# Patient Record
Sex: MALE | Race: WHITE | NOT HISPANIC OR LATINO | URBAN - METROPOLITAN AREA
[De-identification: names, ages, dates, MRNs, and addresses within clinical notes are randomized per-mention and may not be internally consistent; named-entity substitution may affect disease eponyms.]

---

## 2018-04-05 ENCOUNTER — EMERGENCY (EMERGENCY)
Facility: HOSPITAL | Age: 53
LOS: 0 days | Discharge: HOME | End: 2018-04-06
Attending: EMERGENCY MEDICINE

## 2018-04-05 VITALS
SYSTOLIC BLOOD PRESSURE: 121 MMHG | OXYGEN SATURATION: 93 % | RESPIRATION RATE: 16 BRPM | HEART RATE: 88 BPM | DIASTOLIC BLOOD PRESSURE: 68 MMHG | TEMPERATURE: 97 F

## 2018-04-05 DIAGNOSIS — G89.29 OTHER CHRONIC PAIN: ICD-10-CM

## 2018-04-05 DIAGNOSIS — Z86.718 PERSONAL HISTORY OF OTHER VENOUS THROMBOSIS AND EMBOLISM: ICD-10-CM

## 2018-04-05 DIAGNOSIS — T40.2X1A POISONING BY OTHER OPIOIDS, ACCIDENTAL (UNINTENTIONAL), INITIAL ENCOUNTER: ICD-10-CM

## 2018-04-05 DIAGNOSIS — M54.9 DORSALGIA, UNSPECIFIED: ICD-10-CM

## 2018-04-05 DIAGNOSIS — Z13.6 ENCOUNTER FOR SCREENING FOR CARDIOVASCULAR DISORDERS: ICD-10-CM

## 2018-04-05 LAB
ALBUMIN SERPL ELPH-MCNC: 3.6 G/DL — SIGNIFICANT CHANGE UP (ref 3.5–5.2)
ALP SERPL-CCNC: 90 U/L — SIGNIFICANT CHANGE UP (ref 30–115)
ALT FLD-CCNC: 21 U/L — SIGNIFICANT CHANGE UP (ref 0–41)
ANION GAP SERPL CALC-SCNC: 12 MMOL/L — SIGNIFICANT CHANGE UP (ref 7–14)
APAP SERPL-MCNC: <5 UG/ML — LOW (ref 10–30)
AST SERPL-CCNC: 55 U/L — HIGH (ref 0–41)
BASOPHILS # BLD AUTO: 0.04 K/UL — SIGNIFICANT CHANGE UP (ref 0–0.2)
BASOPHILS NFR BLD AUTO: 0.4 % — SIGNIFICANT CHANGE UP (ref 0–1)
BILIRUB SERPL-MCNC: 0.7 MG/DL — SIGNIFICANT CHANGE UP (ref 0.2–1.2)
BUN SERPL-MCNC: 12 MG/DL — SIGNIFICANT CHANGE UP (ref 10–20)
CALCIUM SERPL-MCNC: 8.2 MG/DL — LOW (ref 8.5–10.1)
CHLORIDE SERPL-SCNC: 99 MMOL/L — SIGNIFICANT CHANGE UP (ref 98–110)
CK MB CFR SERPL CALC: 5.5 NG/ML — SIGNIFICANT CHANGE UP (ref 0.6–6.3)
CK SERPL-CCNC: >2000 U/L — HIGH (ref 0–225)
CO2 SERPL-SCNC: 30 MMOL/L — SIGNIFICANT CHANGE UP (ref 17–32)
CREAT SERPL-MCNC: 1.4 MG/DL — SIGNIFICANT CHANGE UP (ref 0.7–1.5)
EOSINOPHIL # BLD AUTO: 0.3 K/UL — SIGNIFICANT CHANGE UP (ref 0–0.7)
EOSINOPHIL NFR BLD AUTO: 3.1 % — SIGNIFICANT CHANGE UP (ref 0–8)
GAS PNL BLDV: SIGNIFICANT CHANGE UP
GLUCOSE SERPL-MCNC: 108 MG/DL — HIGH (ref 70–99)
HCT VFR BLD CALC: 39.4 % — LOW (ref 42–52)
HGB BLD-MCNC: 12.9 G/DL — LOW (ref 14–18)
IMM GRANULOCYTES NFR BLD AUTO: 0.3 % — SIGNIFICANT CHANGE UP (ref 0.1–0.3)
LYMPHOCYTES # BLD AUTO: 1.07 K/UL — LOW (ref 1.2–3.4)
LYMPHOCYTES # BLD AUTO: 11.2 % — LOW (ref 20.5–51.1)
MCHC RBC-ENTMCNC: 28.8 PG — SIGNIFICANT CHANGE UP (ref 27–31)
MCHC RBC-ENTMCNC: 32.7 G/DL — SIGNIFICANT CHANGE UP (ref 32–37)
MCV RBC AUTO: 87.9 FL — SIGNIFICANT CHANGE UP (ref 80–94)
MONOCYTES # BLD AUTO: 0.77 K/UL — HIGH (ref 0.1–0.6)
MONOCYTES NFR BLD AUTO: 8.1 % — SIGNIFICANT CHANGE UP (ref 1.7–9.3)
NEUTROPHILS # BLD AUTO: 7.35 K/UL — HIGH (ref 1.4–6.5)
NEUTROPHILS NFR BLD AUTO: 76.9 % — HIGH (ref 42.2–75.2)
PLATELET # BLD AUTO: 166 K/UL — SIGNIFICANT CHANGE UP (ref 130–400)
POTASSIUM SERPL-MCNC: 4.2 MMOL/L — SIGNIFICANT CHANGE UP (ref 3.5–5)
POTASSIUM SERPL-SCNC: 4.2 MMOL/L — SIGNIFICANT CHANGE UP (ref 3.5–5)
PROT SERPL-MCNC: 6.4 G/DL — SIGNIFICANT CHANGE UP (ref 6–8)
RBC # BLD: 4.48 M/UL — LOW (ref 4.7–6.1)
RBC # FLD: 13.8 % — SIGNIFICANT CHANGE UP (ref 11.5–14.5)
SALICYLATES SERPL-MCNC: <0.3 MG/DL — LOW (ref 4–30)
SODIUM SERPL-SCNC: 141 MMOL/L — SIGNIFICANT CHANGE UP (ref 135–146)
TROPONIN T SERPL-MCNC: <0.01 NG/ML — SIGNIFICANT CHANGE UP
WBC # BLD: 9.56 K/UL — SIGNIFICANT CHANGE UP (ref 4.8–10.8)
WBC # FLD AUTO: 9.56 K/UL — SIGNIFICANT CHANGE UP (ref 4.8–10.8)

## 2018-04-05 RX ORDER — NALOXONE HYDROCHLORIDE 4 MG/.1ML
0.04 SPRAY NASAL ONCE
Qty: 0 | Refills: 0 | Status: COMPLETED | OUTPATIENT
Start: 2018-04-05 | End: 2018-04-05

## 2018-04-05 RX ORDER — SODIUM CHLORIDE 9 MG/ML
1000 INJECTION INTRAMUSCULAR; INTRAVENOUS; SUBCUTANEOUS
Qty: 0 | Refills: 0 | Status: DISCONTINUED | OUTPATIENT
Start: 2018-04-05 | End: 2018-04-05

## 2018-04-05 RX ADMIN — SODIUM CHLORIDE 250 MILLILITER(S): 9 INJECTION INTRAMUSCULAR; INTRAVENOUS; SUBCUTANEOUS at 23:02

## 2018-04-05 RX ADMIN — SODIUM CHLORIDE 250 MILLILITER(S): 9 INJECTION INTRAMUSCULAR; INTRAVENOUS; SUBCUTANEOUS at 19:07

## 2018-04-05 RX ADMIN — NALOXONE HYDROCHLORIDE 0.04 MILLIGRAM(S): 4 SPRAY NASAL at 16:52

## 2018-04-05 NOTE — ED PROVIDER NOTE - PROGRESS NOTE DETAILS
Discussed with sister, states this is not patient's baseline. Discussed with tox, who recommends observing for 6 hours from ingestion (18:00), then placing in obs if not at baseline. Patient required 0.04 mg Narcan IV a couple hour into his stay. Since then pt stable. As per sister pt is not back to baseline so will place in obs. Discussed with CARLOS Michelle. Aware of CK of 2400, will hydrate.

## 2018-04-05 NOTE — ED PROVIDER NOTE - ATTENDING CONTRIBUTION TO CARE
I have reviewed triage notes and vital signs available at this time.  I have reviewed lab results, if any, available at this time.  I have reviewed EKGs, if any, available at this time.     I have reviewed radiology results and radiographs/scans, if any, available at this time.      I have personally seen, evaluated and participated in this patient's care and find this patient's history and physical examination are consistent with the chart documentation, unless noted below.  ***  patient here for lethargy. takes morphine daily. given narcan by EMS with complete resolution of symptoms. Patient observed in ED and became somnolent again.   Labs pending. Toxicology consulted. May need obs placement.    patient care transferred to Dr. Morgan

## 2018-04-05 NOTE — ED PROVIDER NOTE - MEDICAL DECISION MAKING DETAILS
I personally evaluated the patient. I reviewed the Resident’s or Physician Assistant’s note (as assigned above), and agree with the findings and plan except as documented in my note. Patient placed in obs. Patient will have repeat CK and reassessment for sobriety.

## 2018-04-05 NOTE — ED PROVIDER NOTE - OBJECTIVE STATEMENT
Pt is a 53 y/o male with hx of chronic back pain on morphine 30mg TID, anxiety, who presents to ED for potential overdose. Pt went to pain management doctor's office today, was found to be lethargic and EMS was called. Pt was given Narcan 0.4 mg with improvement. Pt still lethargic at this point but awakens to voice. Pt c/o right sided rib pain after fall 2 weeks ago. No SOB, chest pain, fever.

## 2018-04-05 NOTE — ED ADULT TRIAGE NOTE - CHIEF COMPLAINT QUOTE
pt was at rehab and was drowsy, given 0.5 mg of narcan by EMS. pt awake and alert. prescribed morphine for back pain

## 2018-04-05 NOTE — ED PROVIDER NOTE - PHYSICAL EXAMINATION
Constitutional: Well developed, well nourished. NAD.  Head: Normocephalic, atraumatic.  Eyes: PERRL. EOMI.  ENT: No nasal discharge. Mucous membranes moist.  Neck: Supple. Painless ROM.  Cardiovascular: Normal S1, S2. Regular rate and rhythm. No murmurs, rubs, or gallops.  Pulmonary: Normal respiratory rate and effort. Lungs clear to auscultation bilaterally. No wheezing, rales, or rhonchi.  Abdominal: Soft. Nondistended. Nontender. No rebound, guarding, rigidity.  Extremities. Pelvis stable. No lower extremity edema, symmetric calves.  Skin: No rashes, cyanosis.  Neuro: AAOx3. No focal neurological deficits.  Psych: Somnolent but awakens to voice.

## 2018-04-05 NOTE — ED ADULT NURSE NOTE - OBJECTIVE STATEMENT
pt coming here from dr. Hinton office as per patient, states he was feeling dizzy and was lethargic. states he did not take any extra pain medication  then he is prescribed. pt is sleepy but easily aroused and follows commands, answers questions appropriately, pt is on his cell phone. GCS 15, pupils are equal and reactive to light positive PERRLA. pt is placed on continuous oxygen monitoring device, nasal cannula in place 3 liters.

## 2018-04-06 VITALS
RESPIRATION RATE: 18 BRPM | SYSTOLIC BLOOD PRESSURE: 115 MMHG | OXYGEN SATURATION: 98 % | HEART RATE: 84 BPM | DIASTOLIC BLOOD PRESSURE: 71 MMHG | TEMPERATURE: 98 F

## 2018-04-06 LAB
CK SERPL-CCNC: 1954 U/L — HIGH (ref 0–225)
NT-PROBNP SERPL-SCNC: 528 PG/ML — HIGH (ref 0–300)
TROPONIN T SERPL-MCNC: <0.01 NG/ML — SIGNIFICANT CHANGE UP

## 2018-04-06 RX ORDER — RIVAROXABAN 15 MG-20MG
20 KIT ORAL EVERY 24 HOURS
Qty: 0 | Refills: 0 | Status: DISCONTINUED | OUTPATIENT
Start: 2018-04-06 | End: 2018-04-05

## 2018-04-06 RX ORDER — SODIUM CHLORIDE 9 MG/ML
1000 INJECTION INTRAMUSCULAR; INTRAVENOUS; SUBCUTANEOUS ONCE
Qty: 0 | Refills: 0 | Status: COMPLETED | OUTPATIENT
Start: 2018-04-06 | End: 2018-04-06

## 2018-04-06 RX ORDER — FOLIC ACID 0.8 MG
1 TABLET ORAL DAILY
Qty: 0 | Refills: 0 | Status: DISCONTINUED | OUTPATIENT
Start: 2018-04-06 | End: 2018-04-05

## 2018-04-06 RX ORDER — MORPHINE SULFATE 50 MG/1
7.5 CAPSULE, EXTENDED RELEASE ORAL ONCE
Qty: 0 | Refills: 0 | Status: DISCONTINUED | OUTPATIENT
Start: 2018-04-06 | End: 2018-04-05

## 2018-04-06 RX ORDER — MORPHINE SULFATE 50 MG/1
15 CAPSULE, EXTENDED RELEASE ORAL DAILY
Qty: 0 | Refills: 0 | Status: DISCONTINUED | OUTPATIENT
Start: 2018-04-06 | End: 2018-04-05

## 2018-04-06 RX ORDER — FLUOXETINE HCL 10 MG
40 CAPSULE ORAL ONCE
Qty: 0 | Refills: 0 | Status: COMPLETED | OUTPATIENT
Start: 2018-04-06 | End: 2018-04-06

## 2018-04-06 RX ADMIN — SODIUM CHLORIDE 3000 MILLILITER(S): 9 INJECTION INTRAMUSCULAR; INTRAVENOUS; SUBCUTANEOUS at 05:50

## 2018-04-06 RX ADMIN — Medication 40 MILLIGRAM(S): at 11:53

## 2018-04-06 RX ADMIN — MORPHINE SULFATE 15 MILLIGRAM(S): 50 CAPSULE, EXTENDED RELEASE ORAL at 13:16

## 2018-04-06 RX ADMIN — Medication 1 MILLIGRAM(S): at 11:53

## 2018-04-06 RX ADMIN — SODIUM CHLORIDE 250 MILLILITER(S): 9 INJECTION INTRAMUSCULAR; INTRAVENOUS; SUBCUTANEOUS at 05:37

## 2018-04-06 NOTE — ED ADULT NURSE REASSESSMENT NOTE - NS ED NURSE REASSESS COMMENT FT1
pt lying in stretcher attempted to use restroom with assistance. pt receiving IVF via IV access. pt denies pain at this time pt on stretcher with bed alarms audible on cardiac monitoring. denies chest pain at this time,. will continue to monitor.

## 2018-04-06 NOTE — ED CDU PROVIDER INITIAL DAY NOTE - NS ED ROS FT
Constitutional: + lethargic. no fever, chills or generalized weakness  Cardiovascular: no chest pain, no sob, no syncope , no palpitations  Respiratory: no cough, no shortness of breath  Gastrointestinal: no nausea, vomiting or diarrhea. no abdominal pain.  Musculoskeletal: + chronic back pain. no fall or trauma.  no neck pain, no back pain, no joint pain.    Integumentary: no rash or skin changes. no edema  Neurological: + lethargic. no headache, no dizziness, no visual changes, no UE/LE weakness or paresthesias. no change in mental status. no neck pain or stiffness.   Psychiatric: no suicidal or homicidal ideation or attempt. no hallucinations.

## 2018-04-06 NOTE — ED CDU PROVIDER DISPOSITION NOTE - CLINICAL COURSE
53yo man h/o DVT on Xarelto, chronic back pain due to injury, on morphine sulfate, was sent in by pain management out of concern for possible overdose as pt was lethargic in the office. Pt's mental status improved in the ambulance and ED with low dose narcan; however pt denies taking extra meds. Pt was observed in the ED/CDU; labs ok except for initial CPK >2000. Pt was hydrated and CK was repeating, trending down. Pt urinating well with normal renal function. On further history pt offered some cough, painful deep breaths after fall, had some R costal margin pain on palpation. Repeat CXR was pulm congestion, though pt denied SOB and was ambulatory in the ED without sx, as well as able to lie supine without sx. Enzymes and EKG unremarkable; MS remained appropriate throughout CDU stay. Echo unremarkable. Rib series negative for fx. Pt felt better and was d/c home to f/u with his pain management MD and PMD.

## 2018-04-06 NOTE — ED CDU PROVIDER SUBSEQUENT DAY NOTE - PROGRESS NOTE DETAILS
Per tox, pt can be d/c home; however he c/o cough/sob over the last few day and a fall with R rib pain--this may explain the elevated CPK. Says he was not confused due to meds and denies taking extra, but does say he isn't sure why he fell. On my eval he is awake and alert, nontoxic appearing, lung with b/l crackles at bases tho pt is comfortable lying flat. Repeat CXR looks like pulm congestion, no infiltrate. NO obvious rib fx. EKG and first set enzymes negative (except elevated CPK), will repeat with BNP and do echo, reassess. Pt still likely to be d/c home today and wants to go.

## 2018-04-06 NOTE — ED CDU PROVIDER INITIAL DAY NOTE - OBJECTIVE STATEMENT
53 yo male with h/o DVT (on xarelto), chronic back pain (takes 30 mg morphine TID) placed in obs for opioid overdose and reassessment. Patient explains he takes morphine for chronic pain and accidently took an extra dose today. Patient was in pain management doctor's office, noticed patient was lethargic and sent to ED. Patient given 0.4 narcan by EMS with improvement, then given another 0.04. As per ex-wife patient is still not at baseline. no fall or trauma. Patient with elevated CK, will repeat after hydration. Denies fever, chills, chest pain, sob, abdominal pain, N/V, UE/LE weakness or paresthesias, headache, dizziness. no suicidal or homicidal ideation or attempt.

## 2018-06-21 ENCOUNTER — OUTPATIENT (OUTPATIENT)
Dept: OUTPATIENT SERVICES | Facility: HOSPITAL | Age: 53
LOS: 1 days | Discharge: HOME | End: 2018-06-21

## 2018-06-21 DIAGNOSIS — N28.1 CYST OF KIDNEY, ACQUIRED: ICD-10-CM

## 2018-06-21 DIAGNOSIS — D41.01 NEOPLASM OF UNCERTAIN BEHAVIOR OF RIGHT KIDNEY: ICD-10-CM

## 2018-07-19 ENCOUNTER — OUTPATIENT (OUTPATIENT)
Dept: OUTPATIENT SERVICES | Facility: HOSPITAL | Age: 53
LOS: 1 days | Discharge: HOME | End: 2018-07-19

## 2018-07-19 DIAGNOSIS — D41.01 NEOPLASM OF UNCERTAIN BEHAVIOR OF RIGHT KIDNEY: ICD-10-CM

## 2018-07-19 PROBLEM — Z86.718 PERSONAL HISTORY OF OTHER VENOUS THROMBOSIS AND EMBOLISM: Chronic | Status: ACTIVE | Noted: 2018-04-05

## 2018-07-19 PROBLEM — M54.9 DORSALGIA, UNSPECIFIED: Chronic | Status: ACTIVE | Noted: 2018-04-05

## 2019-11-21 ENCOUNTER — EMERGENCY (EMERGENCY)
Facility: HOSPITAL | Age: 54
LOS: 0 days | Discharge: HOME | End: 2019-11-22
Attending: EMERGENCY MEDICINE | Admitting: EMERGENCY MEDICINE
Payer: MEDICARE

## 2019-11-21 VITALS
TEMPERATURE: 98 F | DIASTOLIC BLOOD PRESSURE: 98 MMHG | HEART RATE: 78 BPM | OXYGEN SATURATION: 96 % | RESPIRATION RATE: 18 BRPM | SYSTOLIC BLOOD PRESSURE: 106 MMHG

## 2019-11-21 VITALS — WEIGHT: 199.96 LBS | HEIGHT: 68 IN

## 2019-11-21 DIAGNOSIS — Z79.01 LONG TERM (CURRENT) USE OF ANTICOAGULANTS: ICD-10-CM

## 2019-11-21 DIAGNOSIS — Z90.5 ACQUIRED ABSENCE OF KIDNEY: Chronic | ICD-10-CM

## 2019-11-21 DIAGNOSIS — R56.9 UNSPECIFIED CONVULSIONS: ICD-10-CM

## 2019-11-21 DIAGNOSIS — G40.909 EPILEPSY, UNSPECIFIED, NOT INTRACTABLE, WITHOUT STATUS EPILEPTICUS: ICD-10-CM

## 2019-11-21 PROCEDURE — 99285 EMERGENCY DEPT VISIT HI MDM: CPT | Mod: GC

## 2019-11-21 NOTE — ED ADULT NURSE NOTE - PMH
Chronic back pain    History of DVT (deep vein thrombosis) Chronic back pain    Drug abuse    History of DVT (deep vein thrombosis)

## 2019-11-21 NOTE — ED ADULT NURSE NOTE - CHIEF COMPLAINT QUOTE
Pt states he took Morphine sulfate, at home, got dizzy, sister states he had a seizure, pt is unsure if he did have seizure.

## 2019-11-22 LAB
ALBUMIN SERPL ELPH-MCNC: 3.9 G/DL — SIGNIFICANT CHANGE UP (ref 3.5–5.2)
ALP SERPL-CCNC: 112 U/L — SIGNIFICANT CHANGE UP (ref 30–115)
ALT FLD-CCNC: 32 U/L — SIGNIFICANT CHANGE UP (ref 0–41)
ANION GAP SERPL CALC-SCNC: 11 MMOL/L — SIGNIFICANT CHANGE UP (ref 7–14)
AST SERPL-CCNC: 18 U/L — SIGNIFICANT CHANGE UP (ref 0–41)
BASOPHILS # BLD AUTO: 0.07 K/UL — SIGNIFICANT CHANGE UP (ref 0–0.2)
BASOPHILS NFR BLD AUTO: 0.7 % — SIGNIFICANT CHANGE UP (ref 0–1)
BILIRUB SERPL-MCNC: 0.2 MG/DL — SIGNIFICANT CHANGE UP (ref 0.2–1.2)
BUN SERPL-MCNC: 10 MG/DL — SIGNIFICANT CHANGE UP (ref 10–20)
CALCIUM SERPL-MCNC: 8.7 MG/DL — SIGNIFICANT CHANGE UP (ref 8.5–10.1)
CHLORIDE SERPL-SCNC: 105 MMOL/L — SIGNIFICANT CHANGE UP (ref 98–110)
CO2 SERPL-SCNC: 26 MMOL/L — SIGNIFICANT CHANGE UP (ref 17–32)
CREAT SERPL-MCNC: 1.2 MG/DL — SIGNIFICANT CHANGE UP (ref 0.7–1.5)
EOSINOPHIL # BLD AUTO: 0.53 K/UL — SIGNIFICANT CHANGE UP (ref 0–0.7)
EOSINOPHIL NFR BLD AUTO: 5.5 % — SIGNIFICANT CHANGE UP (ref 0–8)
GLUCOSE SERPL-MCNC: 119 MG/DL — HIGH (ref 70–99)
HCT VFR BLD CALC: 47.8 % — SIGNIFICANT CHANGE UP (ref 42–52)
HGB BLD-MCNC: 15.1 G/DL — SIGNIFICANT CHANGE UP (ref 14–18)
IMM GRANULOCYTES NFR BLD AUTO: 0.2 % — SIGNIFICANT CHANGE UP (ref 0.1–0.3)
LYMPHOCYTES # BLD AUTO: 1.97 K/UL — SIGNIFICANT CHANGE UP (ref 1.2–3.4)
LYMPHOCYTES # BLD AUTO: 20.3 % — LOW (ref 20.5–51.1)
MAGNESIUM SERPL-MCNC: 1.9 MG/DL — SIGNIFICANT CHANGE UP (ref 1.8–2.4)
MCHC RBC-ENTMCNC: 28.1 PG — SIGNIFICANT CHANGE UP (ref 27–31)
MCHC RBC-ENTMCNC: 31.6 G/DL — LOW (ref 32–37)
MCV RBC AUTO: 89 FL — SIGNIFICANT CHANGE UP (ref 80–94)
MONOCYTES # BLD AUTO: 0.68 K/UL — HIGH (ref 0.1–0.6)
MONOCYTES NFR BLD AUTO: 7 % — SIGNIFICANT CHANGE UP (ref 1.7–9.3)
NEUTROPHILS # BLD AUTO: 6.43 K/UL — SIGNIFICANT CHANGE UP (ref 1.4–6.5)
NEUTROPHILS NFR BLD AUTO: 66.3 % — SIGNIFICANT CHANGE UP (ref 42.2–75.2)
NRBC # BLD: 0 /100 WBCS — SIGNIFICANT CHANGE UP (ref 0–0)
PHOSPHATE SERPL-MCNC: 4.1 MG/DL — SIGNIFICANT CHANGE UP (ref 2.1–4.9)
PLATELET # BLD AUTO: 246 K/UL — SIGNIFICANT CHANGE UP (ref 130–400)
POTASSIUM SERPL-MCNC: 4.2 MMOL/L — SIGNIFICANT CHANGE UP (ref 3.5–5)
POTASSIUM SERPL-SCNC: 4.2 MMOL/L — SIGNIFICANT CHANGE UP (ref 3.5–5)
PROT SERPL-MCNC: 6.6 G/DL — SIGNIFICANT CHANGE UP (ref 6–8)
RBC # BLD: 5.37 M/UL — SIGNIFICANT CHANGE UP (ref 4.7–6.1)
RBC # FLD: 14.2 % — SIGNIFICANT CHANGE UP (ref 11.5–14.5)
SODIUM SERPL-SCNC: 142 MMOL/L — SIGNIFICANT CHANGE UP (ref 135–146)
WBC # BLD: 9.7 K/UL — SIGNIFICANT CHANGE UP (ref 4.8–10.8)
WBC # FLD AUTO: 9.7 K/UL — SIGNIFICANT CHANGE UP (ref 4.8–10.8)

## 2019-11-22 PROCEDURE — 70450 CT HEAD/BRAIN W/O DYE: CPT | Mod: 26

## 2019-11-22 NOTE — ED PROVIDER NOTE - CLINICAL SUMMARY MEDICAL DECISION MAKING FREE TEXT BOX
54yM p/w witnessed sz, now back to baseline, no apparent traumatic injuries, no fever.  Labs, EKG, CTH reassuring.  Pt unwilling to stay in hospital for EEG monitoring/further workup.  D/w neurology - pt w/ very good preexisting neuro f/u, as he is back to baseline, they are willing to see him in the office for further workup.  Ok to dc with supportive care, return precautions.

## 2019-11-22 NOTE — ED PROVIDER NOTE - OBJECTIVE STATEMENT
53yo M with PMH of chronic back pain 2/2 prior back injury (fractures 53yo M with PMH of chronic back pain 2/2 prior back injury, depression, neuropathy, and kidney cancer s/p removal presenting to ED with brief sz episodes that occurred PTA. Patient was at home and his sister states she heard a loud thud, went to check on him, and found him on the ground with eye rolling and tensing up of arms. Lasted for about 1 minute, patient started to become more alert, and then had another 1 minute episode of sz-like activity. Patient states he had one seizure in the past 2/2 drug building up in his system due to kidney dysfunction. Sees his neurologist Dr. Treviño regularly, last saw last week. Currently back to baseline in ED, GCS 15. Denies any f/c, CP, SOB, neck pain, abd pain, n/v/d, new numbness/tingling, extremity pain, HA, dizziness, or vision changes. Acting appropriately. No drug use. +Smoker.

## 2019-11-22 NOTE — ED PROVIDER NOTE - PHYSICAL EXAMINATION
PHYSICAL EXAM: I have reviewed current vital signs.  GENERAL: NAD, well-developed.  HEAD:  Normocephalic, atraumatic.  EYES: EOMI, PERRL, conjunctiva and sclera clear.  ENT: MMM, no erythema/exudates.  NECK: Supple, full ROM.  CHEST/LUNG: Clear to auscultation bilaterally; no wheezes, rales, or rhonchi.  HEART: Regular rate and rhythm, normal S1 and S2; no murmurs, rubs, or gallops.  ABDOMEN: Soft, nontender, nondistended. Atraumatic.  EXTREMITIES:  2+ peripheral pulses; FROM.  PSYCH: Cooperative, appropriate, normal mood and affect.  NEUROLOGY: A&O x 3. Motor 5/5. Sensory intact. No focal neurological deficits. CN II - XII grossly intact. No facial droop, speech WNL.  SKIN: Warm and dry.

## 2019-11-22 NOTE — ED PROVIDER NOTE - NS ED ROS FT
Constitutional:  No fevers or chills.  Eyes:  No visual changes, eye pain, or discharge.  ENT:  No sore throat.  Neck:  No neck pain.  Cardiac:  No CP or edema.  Resp:  No cough or SOB.    GI:  No nausea, vomiting, diarrhea, or abdominal pain.  :  No dysuria, frequency, or hematuria.  MSK:  No myalgias or joint pain/swelling.  Neuro:  No headache, dizziness, or weakness.  Skin:  No skin rash.

## 2019-11-22 NOTE — ED PROVIDER NOTE - PROGRESS NOTE DETAILS
Informed patient and family of lab and radiology results. Spoke with Dr. Treviño's partner. Encouraged patient to f/u with Dr. Treviño as soon as possible. GCS 15, no further seizure like activity. Well appearing. Tolerating PO without any difficulty. Patient will have outpatient w/u done with Dr. Treviño.

## 2019-11-22 NOTE — ED PROVIDER NOTE - ATTENDING CONTRIBUTION TO CARE
54yM chronic back pain/neuropathy, renal cancer s/p nephrectomy p/w seizure episode - sister heard him drop something (which happens often) and checked up on him - found him seizing, w/ tense b/l UE and eyes rolling back.  She witnessed ~1min of sz activity, which then seemed to joshua, but he then had another 1min of sz activity with no return to consciousness in between.  No fevers or recent illness. This is his second lifetime seizure.  No recent head trauma.  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    CONSTITUTIONAL: well developed; well nourished; well appearing in no acute distress  HEAD: normocephalic; atraumatic  EYES: PERRL, no conjunctival injection, no scleral icterus  ENT: no nasal discharge; airway clear.  NECK: supple; non tender. + full passive ROM in all directions  CARD: S1, S2 normal; no murmurs, gallops, or rubs. Regular rate and rhythm  RESP: no wheezes, rales or rhonchi. Good air movement bilaterally without significant accessory muscle use  ABD: soft; non-distended; non-tender. No rebound, no guarding, no pulsatile abdominal mass  EXT: moving all extremities spontaneously. No clubbing, cyanosis or edema  SKIN: warm and dry, no lesions noted  NEURO: alert, oriented, CN II-XII grossly intact, motor and sensory grossly intact, speech nonslurred, no focal deficits. GCS 15  PSYCH: calm, cooperative, appropriate, good eye contact, logical thought process, no apparent danger to self or others

## 2019-11-22 NOTE — ED PROVIDER NOTE - NSFOLLOWUPINSTRUCTIONS_ED_ALL_ED_FT
Please follow-up as soon as possible with your neurologist, Dr. Treviño.    Seizure  A seizure is abnormal electrical activity in the brain; the specific cause may or may not be found. Prior to a seizure you may experience a warning sensation (aura) that may include fear, nausea, dizziness, and visual changes such as flashing lights of spots. Common symptoms during the seizure may include an altered mental status, rhythmic jerking movements, drooling, grunting, loss of bladder or bowel control, or tongue biting. After a seizure, you may feel confused and sleepy.     Do not swim, drive, operate machinery, or engage in any risky activity during which a seizure could cause further injury to you or others. Teach friends and family what to do if you HAVE a seizure which includes laying you on the ground with your head on a cushion and turning you to the side to keep your breathing passages clear in case of vomiting.    SEEK IMMEDIATE MEDICAL CARE IF YOU HAVE ANY OF THE FOLLOWING SYMPTOMS: seizure lasting over 5 minutes, not waking up or persistent altered mental status after the seizure, or more frequent or worsening seizures.

## 2019-11-22 NOTE — ED PROVIDER NOTE - PATIENT PORTAL LINK FT
You can access the FollowMyHealth Patient Portal offered by [facility] by registering at the following website: [email or web address]. By joining [facility]’s FollowMyHealth portal, you will also be able to view your health information using other applications (apps) compatible with our system.

## 2020-11-11 PROBLEM — F19.10 OTHER PSYCHOACTIVE SUBSTANCE ABUSE, UNCOMPLICATED: Chronic | Status: ACTIVE | Noted: 2019-11-21

## 2020-11-16 ENCOUNTER — OUTPATIENT (OUTPATIENT)
Dept: OUTPATIENT SERVICES | Facility: HOSPITAL | Age: 55
LOS: 1 days | Discharge: HOME | End: 2020-11-16
Payer: MEDICARE

## 2020-11-16 DIAGNOSIS — Z90.5 ACQUIRED ABSENCE OF KIDNEY: Chronic | ICD-10-CM

## 2020-11-16 PROCEDURE — 93970 EXTREMITY STUDY: CPT | Mod: 26

## 2020-11-21 ENCOUNTER — OUTPATIENT (OUTPATIENT)
Dept: OUTPATIENT SERVICES | Facility: HOSPITAL | Age: 55
LOS: 1 days | Discharge: HOME | End: 2020-11-21
Payer: MEDICARE

## 2020-11-21 DIAGNOSIS — Z90.5 ACQUIRED ABSENCE OF KIDNEY: Chronic | ICD-10-CM

## 2020-11-21 DIAGNOSIS — C64.9 MALIGNANT NEOPLASM OF UNSPECIFIED KIDNEY, EXCEPT RENAL PELVIS: ICD-10-CM

## 2020-11-21 PROCEDURE — 74182 MRI ABDOMEN W/CONTRAST: CPT | Mod: 26

## 2020-11-21 PROCEDURE — 71046 X-RAY EXAM CHEST 2 VIEWS: CPT | Mod: 26

## 2020-11-23 DIAGNOSIS — M79.89 OTHER SPECIFIED SOFT TISSUE DISORDERS: ICD-10-CM

## 2021-08-29 ENCOUNTER — INPATIENT (INPATIENT)
Facility: HOSPITAL | Age: 56
LOS: 2 days | Discharge: HOME | End: 2021-09-01
Attending: INTERNAL MEDICINE | Admitting: INTERNAL MEDICINE
Payer: MEDICARE

## 2021-08-29 VITALS — WEIGHT: 226.41 LBS | HEIGHT: 68 IN

## 2021-08-29 DIAGNOSIS — N18.9 CHRONIC KIDNEY DISEASE, UNSPECIFIED: ICD-10-CM

## 2021-08-29 DIAGNOSIS — R20.0 ANESTHESIA OF SKIN: ICD-10-CM

## 2021-08-29 DIAGNOSIS — R20.1 HYPOESTHESIA OF SKIN: ICD-10-CM

## 2021-08-29 DIAGNOSIS — Z79.899 OTHER LONG TERM (CURRENT) DRUG THERAPY: ICD-10-CM

## 2021-08-29 DIAGNOSIS — R29.898 OTHER SYMPTOMS AND SIGNS INVOLVING THE MUSCULOSKELETAL SYSTEM: ICD-10-CM

## 2021-08-29 DIAGNOSIS — Z85.528 PERSONAL HISTORY OF OTHER MALIGNANT NEOPLASM OF KIDNEY: ICD-10-CM

## 2021-08-29 DIAGNOSIS — F32.9 MAJOR DEPRESSIVE DISORDER, SINGLE EPISODE, UNSPECIFIED: ICD-10-CM

## 2021-08-29 DIAGNOSIS — Z90.5 ACQUIRED ABSENCE OF KIDNEY: Chronic | ICD-10-CM

## 2021-08-29 DIAGNOSIS — G92 TOXIC ENCEPHALOPATHY: ICD-10-CM

## 2021-08-29 DIAGNOSIS — Z86.718 PERSONAL HISTORY OF OTHER VENOUS THROMBOSIS AND EMBOLISM: ICD-10-CM

## 2021-08-29 DIAGNOSIS — Z79.01 LONG TERM (CURRENT) USE OF ANTICOAGULANTS: ICD-10-CM

## 2021-08-29 DIAGNOSIS — Z79.891 LONG TERM (CURRENT) USE OF OPIATE ANALGESIC: ICD-10-CM

## 2021-08-29 DIAGNOSIS — R47.01 APHASIA: ICD-10-CM

## 2021-08-29 DIAGNOSIS — T40.2X5A ADVERSE EFFECT OF OTHER OPIOIDS, INITIAL ENCOUNTER: ICD-10-CM

## 2021-08-29 DIAGNOSIS — F17.210 NICOTINE DEPENDENCE, CIGARETTES, UNCOMPLICATED: ICD-10-CM

## 2021-08-29 DIAGNOSIS — G89.29 OTHER CHRONIC PAIN: ICD-10-CM

## 2021-08-29 DIAGNOSIS — E87.2 ACIDOSIS: ICD-10-CM

## 2021-08-29 DIAGNOSIS — I12.9 HYPERTENSIVE CHRONIC KIDNEY DISEASE WITH STAGE 1 THROUGH STAGE 4 CHRONIC KIDNEY DISEASE, OR UNSPECIFIED CHRONIC KIDNEY DISEASE: ICD-10-CM

## 2021-08-29 DIAGNOSIS — I63.9 CEREBRAL INFARCTION, UNSPECIFIED: ICD-10-CM

## 2021-08-29 DIAGNOSIS — M54.9 DORSALGIA, UNSPECIFIED: ICD-10-CM

## 2021-08-29 DIAGNOSIS — E78.5 HYPERLIPIDEMIA, UNSPECIFIED: ICD-10-CM

## 2021-08-29 DIAGNOSIS — R47.1 DYSARTHRIA AND ANARTHRIA: ICD-10-CM

## 2021-08-29 DIAGNOSIS — Z66 DO NOT RESUSCITATE: ICD-10-CM

## 2021-08-29 DIAGNOSIS — R29.810 FACIAL WEAKNESS: ICD-10-CM

## 2021-08-29 DIAGNOSIS — Z90.5 ACQUIRED ABSENCE OF KIDNEY: ICD-10-CM

## 2021-08-29 DIAGNOSIS — F41.9 ANXIETY DISORDER, UNSPECIFIED: ICD-10-CM

## 2021-08-29 LAB
ALBUMIN SERPL ELPH-MCNC: 3.5 G/DL — SIGNIFICANT CHANGE UP (ref 3.5–5.2)
ALP SERPL-CCNC: 108 U/L — SIGNIFICANT CHANGE UP (ref 30–115)
ALT FLD-CCNC: 16 U/L — SIGNIFICANT CHANGE UP (ref 0–41)
ANION GAP SERPL CALC-SCNC: 12 MMOL/L — SIGNIFICANT CHANGE UP (ref 7–14)
APAP SERPL-MCNC: <5 UG/ML — LOW (ref 10–30)
APTT BLD: 31.5 SEC — SIGNIFICANT CHANGE UP (ref 27–39.2)
AST SERPL-CCNC: 71 U/L — HIGH (ref 0–41)
BASE EXCESS BLDV CALC-SCNC: 1.6 MMOL/L — SIGNIFICANT CHANGE UP (ref -2–3)
BASOPHILS # BLD AUTO: 0.02 K/UL — SIGNIFICANT CHANGE UP (ref 0–0.2)
BASOPHILS NFR BLD AUTO: 0.3 % — SIGNIFICANT CHANGE UP (ref 0–1)
BILIRUB SERPL-MCNC: 1.1 MG/DL — SIGNIFICANT CHANGE UP (ref 0.2–1.2)
BUN SERPL-MCNC: 8 MG/DL — LOW (ref 10–20)
CALCIUM SERPL-MCNC: 8.7 MG/DL — SIGNIFICANT CHANGE UP (ref 8.5–10.1)
CHLORIDE SERPL-SCNC: 99 MMOL/L — SIGNIFICANT CHANGE UP (ref 98–110)
CO2 SERPL-SCNC: 27 MMOL/L — SIGNIFICANT CHANGE UP (ref 17–32)
CREAT SERPL-MCNC: 1.5 MG/DL — SIGNIFICANT CHANGE UP (ref 0.7–1.5)
EOSINOPHIL # BLD AUTO: 0.09 K/UL — SIGNIFICANT CHANGE UP (ref 0–0.7)
EOSINOPHIL NFR BLD AUTO: 1.3 % — SIGNIFICANT CHANGE UP (ref 0–8)
GAS PNL BLDV: SIGNIFICANT CHANGE UP
GLUCOSE SERPL-MCNC: 110 MG/DL — HIGH (ref 70–99)
HCO3 BLDV-SCNC: 30 MMOL/L — HIGH (ref 22–29)
HCT VFR BLD CALC: 42.5 % — SIGNIFICANT CHANGE UP (ref 42–52)
HGB BLD-MCNC: 13.9 G/DL — LOW (ref 14–18)
IMM GRANULOCYTES NFR BLD AUTO: 0.3 % — SIGNIFICANT CHANGE UP (ref 0.1–0.3)
INR BLD: 1.03 RATIO — SIGNIFICANT CHANGE UP (ref 0.65–1.3)
LACTATE BLDV-MCNC: 1.2 MMOL/L — SIGNIFICANT CHANGE UP (ref 0.5–2)
LYMPHOCYTES # BLD AUTO: 1.05 K/UL — LOW (ref 1.2–3.4)
LYMPHOCYTES # BLD AUTO: 14.6 % — LOW (ref 20.5–51.1)
MCHC RBC-ENTMCNC: 29.4 PG — SIGNIFICANT CHANGE UP (ref 27–31)
MCHC RBC-ENTMCNC: 32.7 G/DL — SIGNIFICANT CHANGE UP (ref 32–37)
MCV RBC AUTO: 90 FL — SIGNIFICANT CHANGE UP (ref 80–94)
MONOCYTES # BLD AUTO: 0.77 K/UL — HIGH (ref 0.1–0.6)
MONOCYTES NFR BLD AUTO: 10.7 % — HIGH (ref 1.7–9.3)
NEUTROPHILS # BLD AUTO: 5.23 K/UL — SIGNIFICANT CHANGE UP (ref 1.4–6.5)
NEUTROPHILS NFR BLD AUTO: 72.8 % — SIGNIFICANT CHANGE UP (ref 42.2–75.2)
NRBC # BLD: 0 /100 WBCS — SIGNIFICANT CHANGE UP (ref 0–0)
PCO2 BLDV: 66 MMHG — HIGH (ref 42–55)
PH BLDV: 7.27 — LOW (ref 7.32–7.43)
PLATELET # BLD AUTO: 160 K/UL — SIGNIFICANT CHANGE UP (ref 130–400)
PO2 BLDV: 56 MMHG — SIGNIFICANT CHANGE UP
POTASSIUM SERPL-MCNC: 4.1 MMOL/L — SIGNIFICANT CHANGE UP (ref 3.5–5)
POTASSIUM SERPL-SCNC: 4.1 MMOL/L — SIGNIFICANT CHANGE UP (ref 3.5–5)
PROT SERPL-MCNC: 6 G/DL — SIGNIFICANT CHANGE UP (ref 6–8)
PROTHROM AB SERPL-ACNC: 11.8 SEC — SIGNIFICANT CHANGE UP (ref 9.95–12.87)
RBC # BLD: 4.72 M/UL — SIGNIFICANT CHANGE UP (ref 4.7–6.1)
RBC # FLD: 14.5 % — SIGNIFICANT CHANGE UP (ref 11.5–14.5)
SALICYLATES SERPL-MCNC: <0.3 MG/DL — LOW (ref 4–30)
SAO2 % BLDV: 86.3 % — SIGNIFICANT CHANGE UP
SARS-COV-2 RNA SPEC QL NAA+PROBE: SIGNIFICANT CHANGE UP
SODIUM SERPL-SCNC: 138 MMOL/L — SIGNIFICANT CHANGE UP (ref 135–146)
TROPONIN T SERPL-MCNC: <0.01 NG/ML — SIGNIFICANT CHANGE UP
WBC # BLD: 7.18 K/UL — SIGNIFICANT CHANGE UP (ref 4.8–10.8)
WBC # FLD AUTO: 7.18 K/UL — SIGNIFICANT CHANGE UP (ref 4.8–10.8)

## 2021-08-29 PROCEDURE — 0042T: CPT

## 2021-08-29 PROCEDURE — 70450 CT HEAD/BRAIN W/O DYE: CPT | Mod: 26,MA,59

## 2021-08-29 PROCEDURE — 70498 CT ANGIOGRAPHY NECK: CPT | Mod: 26,MA

## 2021-08-29 PROCEDURE — 93010 ELECTROCARDIOGRAM REPORT: CPT

## 2021-08-29 PROCEDURE — 99291 CRITICAL CARE FIRST HOUR: CPT

## 2021-08-29 PROCEDURE — 70496 CT ANGIOGRAPHY HEAD: CPT | Mod: 26,MA

## 2021-08-29 RX ORDER — NALOXONE HYDROCHLORIDE 4 MG/.1ML
0.4 SPRAY NASAL ONCE
Refills: 0 | Status: COMPLETED | OUTPATIENT
Start: 2021-08-29 | End: 2021-08-29

## 2021-08-29 RX ORDER — SODIUM CHLORIDE 9 MG/ML
1000 INJECTION, SOLUTION INTRAVENOUS ONCE
Refills: 0 | Status: COMPLETED | OUTPATIENT
Start: 2021-08-29 | End: 2021-08-29

## 2021-08-29 RX ADMIN — NALOXONE HYDROCHLORIDE 0.4 MILLIGRAM(S): 4 SPRAY NASAL at 20:05

## 2021-08-29 RX ADMIN — SODIUM CHLORIDE 1000 MILLILITER(S): 9 INJECTION, SOLUTION INTRAVENOUS at 20:05

## 2021-08-29 NOTE — ED PROVIDER NOTE - PROGRESS NOTE DETAILS
JR: Discussed case with Dr. Haque- patient is out of the window for tpa. Will follow for HCT, CTA, CTP BK: Received signout from Dr. Ontiveros. Patient found down around 1 PM with R facial droop, R sided weakness, and aphasia, last known normal unknown. CT non con negative. Pending CTA and perfusion. Patient also took multiple doses of morphine PTA, somnolent, confused, retaining CO2. Will give narcan and reassess.

## 2021-08-29 NOTE — ED PROVIDER NOTE - NS ED ROS FT
Review of Systems:  •	CONSTITUTIONAL - No fever, No diaphoresis, No weight change  •	SKIN - No rash  •	HEMATOLOGIC - No abnormal bleeding or bruising  •	EYES - No eye pain, No blurred vision  •	ENT - No change in hearing, No sore throat, No neck pain, No rhinorrhea, No ear pain  •	RESPIRATORY - No shortness of breath, No cough  •	CARDIAC -No chest pain, No palpitations  •	GI - No abdominal pain, No nausea, No vomiting, No diarrhea, No constipation, No bright red blood per rectum or melena. No flank pain  •                 - No dysuria, frequency, hematuria.   •	ENDO - No polydypsia, No polyuria, No heat/cold intolerance  •	MUSCULOSKELETAL - No joint paint, No swelling, No back pain  •	NEUROLOGIC - No numbness, + right sided weakness, + right facial droop, + difficulty speaking, No headache, No dizziness  All other systems negative, unless specified in HPI

## 2021-08-29 NOTE — ED PROVIDER NOTE - ATTENDING CONTRIBUTION TO CARE
I immediately evaluated the patient upon arrival to the ED. Stroke alert per-notification BIBEMS for R hand weakness and R sided facial droop. His neighbors found him down on the ground at 11am in his current condition. Unknown last known well. I immediately evaluated the patient upon arrival to the ED. Stroke alert per-notification BIBEMS for R hand weakness and R sided facial droop. His neighbors found him down on the ground at 1:30 pm in his current condition. Unknown last known well. He reports that he took 30mg po morphine this morning and an additional 2 extra tablets this afternoon. Denies other ingestions.     CONSTITUTIONAL: Patient is sleepy but easily arousible to voice   SKIN: skin exam is warm and dry, no acute rash.  HEAD: Normocephalic; atraumatic.  EYES: PERRL, 2 mm bilateral, no nystagmus, EOM intact; conjunctiva and sclera clear.  ENT: No nasal discharge; airway clear.   NECK: Supple; non tender. + full passive ROM in all directions. No JVD  CARD: S1, S2 normal; no murmurs, gallops, or rubs. Regular rate and rhythm. + Symmetric Strong Pulses  RESP: No wheezes, rales or rhonchi. Good air movement bilaterally  ABD: soft; non-distended; non-tender. No Rebound, No Guarding, No signs of peritonitis, No CVA tenderness. No pulsatile abdominal mass. + Strong and Symmetric Pulses  EXT: Normal ROM. No clubbing, cyanosis or edema. Dp and Pt Pulses intact. Cap refill less than 3 seconds  NEURO: Slurred speech, normal finger to nose, normal romberg, b/l LE weakness, R hand decreased  strength, decreased sensation to R leg. Sleepy but easily arousible, oriented. No Focal deficits. GCS 15. NIH 11  PSYCH: Cooperative, appropriate.    a/p: stroke alert immediately called- HCT, CTA head/neck, CTP, ekg, cxr, labs, tox panel, will monitor on ETCO2 and frequent neuro checks.

## 2021-08-29 NOTE — ED PROVIDER NOTE - OBJECTIVE STATEMENT
Patient is a 55 yo male with PMHx of DVT on Xarelto, chronic back pain, substance abuse c/o right sided weakness, right facial droop, and trouble speaking at around 11 AM this morning. Unknown last week known. Patient was found by neighbors to be on the ground at home, with right facial droop, right sided weakness, and difficulty speaking. Took morphine pills this morning. Denies fever, chills, chest pain, SOB, cough, abdominal pain, n/v/d.

## 2021-08-29 NOTE — ED PROVIDER NOTE - CLINICAL SUMMARY MEDICAL DECISION MAKING FREE TEXT BOX
Stroke alert per-notification BIBEMS for R hand weakness and R sided facial droop. His neighbors found him down on the ground at 1:30 pm in his current condition. EKG, labs and imaging reviewed. Patient given narcan x1 with improved response. HCT unremarkable, and CTA with no lvo. Suspect ingestion responsible for presentation, as after narcan slurred speech resolved, and neuro exam non-focal. Neuro accepts admission to their service (tele) for q4 neuro checks, EEG. IVF with improvement of BP.
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
## 2021-08-29 NOTE — ED PROVIDER NOTE - PHYSICAL EXAMINATION
CONSTITUTIONAL: Well-developed; well-nourished; in no acute distress.  SKIN: warm, dry  HEAD: Normocephalic; atraumatic.  EYES: no conjunctival injection. PERRL.  ENT: No nasal discharge; airway clear.  NECK: Supple; non tender.  CARD: S1, S2 normal; no murmurs, gallops, or rubs. Regular rate and rhythm.  RESP: No wheezes, rales or rhonchi.  ABD: soft ntnd  EXT: Normal ROM.  No clubbing, cyanosis or edema.  LYMPH: No acute cervical adenopathy.  NEURO: Alert, oriented x 2. Right UE 3/5 strength, left UE 5/5 strength, right and left LE 3/5 strength. Can answer questions and follow commands. Mild slurring of speech. CN 2 to 12 intact.  PSYCH: Cooperative, appropriate.

## 2021-08-29 NOTE — CONSULT NOTE ADULT - SUBJECTIVE AND OBJECTIVE BOX
NEUROLOGY CONSULT    HPI:    55 yo M with pmh of chronic pain on morphine, anxiety/depression on multiple psychiatric medications, DVT on Xarelto , HLD, HTN presents to the hospital [facility] due to lethargy . The patient had a scheduled dinner planned with neighbor and was found unresponsive at home. She called the daughter which is at the bedside giving history. The patient is a poor historian and unable to give history given mental status, however he does remember that he was dizzy and he fell and was unable to get up. The daughter is unsure about any medical history and was unable to give further information. Per chart review, the patient has remote history of morphine overdose. He was code stroke in ED with NIH of 11. CTH was negative for hemorrhage/ cva. CTAHN and CT Perfusion pending. Patient gave limited review of systems , however he denies pain anywhere.      MEDICATIONS  Home Medications:  calcium-vitamin D:  (06 Apr 2018 10:10)  gabapentin 300 mg oral tablet: 300 milligram(s) orally 2 times a day, As Needed (06 Apr 2018 10:10)  morphine 10 mg oral tablet: 10 milligram(s) orally 3 times a day (06 Apr 2018 10:10)  PROzac 10 mg oral capsule: 1 cap(s) orally once a day (06 Apr 2018 10:10)  SEROquel:  (06 Apr 2018 10:10)  simvastatin:  (06 Apr 2018 10:10)  Valium:  (06 Apr 2018 10:10)  Xarelto 15 mg oral tablet: 1 tab(s) orally once a day (in the evening) (06 Apr 2018 10:10)    MEDICATIONS  (STANDING):    MEDICATIONS  (PRN):      FAMILY HISTORY:    SOCIAL HISTORY: negative for tobacco, alcohol, or ilicit drug use.    Allergies    No Known Allergies    Intolerances        PHYSICAL EXAMINATION:  GEN: NAD, pleasant, cooperative  CVS: RRR, no carotid bruit  CHEST: No signs of  distress, on room air  ABD: Soft, NTTP  NEURO:     MENTAL STATUS: AAOx1    LANG/SPEECH: slurred speech     CRANIAL NERVES:    II: Pupils equal and reactive    III, IV, VI: EOM intact, no gaze preference or deviation    V: normal    VII: no facial asymmetry    VIII: slurred     MOTOR: 0/5 b/l LE  ; LUE able to hold ~ 5 seconds , RUE motor intact    REFLEXES: 2/4 throughout, bilateral flexor planters    SENSORY: decreased sensation RLE    COORD: unable to assess given mental status  LABS:                        13.9   7.18  )-----------( 160      ( 29 Aug 2021 18:25 )             42.5     08-29    138  |  99  |  8<L>  ----------------------------<  110<H>  4.1   |  27  |  1.5    Ca    8.7      29 Aug 2021 18:25    TPro  6.0  /  Alb  3.5  /  TBili  1.1  /  DBili  x   /  AST  71<H>  /  ALT  16  /  AlkPhos  108  08-29    Hemoglobin A1C:   Vitamin B12   PT/INR - ( 29 Aug 2021 18:25 )   PT: 11.80 sec;   INR: 1.03 ratio         PTT - ( 29 Aug 2021 18:25 )  PTT:31.5 sec  CAPILLARY BLOOD GLUCOSE        Microbiology:      RADIOLOGY, EKG AND ADDITIONAL TESTS: Reviewed.        ASSESSMENT AND PLAN:    55 yo M with pmh of chronic pain on morphine, anxiety/depression on multiple psychiatric medications, DVT on Xarelto , HLD, HTN presents to the [facility] due to lethargy . The patient had a scheduled dinner planned with neighbor and was found unresponsive at home. She called the daughter which is at the bedside giving history. The patient is a poor historian and unable to give history given mental status, however he does remember that he was dizzy and he fell and was unable to get up. The daughter is unsure about any medical history and was unable to give further information. Per chart review, the patient has remote history of morphine overdose. He was code stroke in ED with NIH of 11. CTH was negative for hemorrhage/ cva. CTAHN and CT Perfusion pending. Patient gave limited review of systems , however he denies pain anywhere.     Metabolic encephalopathy 2/2 CVA vs Seizure vs. Polypharmacy     PLAN:  - Hb A1c , lipid profile  - f/u CTA HN, CT Perfusion  - DVT PPX  - continue home dose statin  - MRI Brain no contrast  - q4h neuro checks  - SBP goal 140-180  - rEEG  - Mg> 2    Case discussed with Dr. Zuniga              NEUROLOGY CONSULT    HPI:    55 yo M with pmh of chronic pain on morphine, anxiety/depression on multiple psychiatric medications, DVT on Xarelto , HLD, HTN presents to the hospital [facility] due to lethargy . The patient had a scheduled dinner planned with neighbor and was found unresponsive at home. She called the daughter which is at the bedside giving history. The patient is a poor historian and unable to give history given mental status, however he does remember that he was dizzy and he fell and was unable to get up. The daughter is unsure about any medical history and was unable to give further information. Per chart review, the patient has remote history of morphine overdose. He was code stroke in ED with NIH of 11. CTH was negative for hemorrhage/ cva. CTAHN and CT Perfusion pending. Patient gave limited review of systems , however he denies pain anywhere.      MEDICATIONS  Home Medications:  calcium-vitamin D:  (06 Apr 2018 10:10)  gabapentin 300 mg oral tablet: 300 milligram(s) orally 2 times a day, As Needed (06 Apr 2018 10:10)  morphine 10 mg oral tablet: 10 milligram(s) orally 3 times a day (06 Apr 2018 10:10)  PROzac 10 mg oral capsule: 1 cap(s) orally once a day (06 Apr 2018 10:10)  SEROquel:  (06 Apr 2018 10:10)  simvastatin:  (06 Apr 2018 10:10)  Valium:  (06 Apr 2018 10:10)  Xarelto 15 mg oral tablet: 1 tab(s) orally once a day (in the evening) (06 Apr 2018 10:10)    MEDICATIONS  (STANDING):    MEDICATIONS  (PRN):      FAMILY HISTORY:    SOCIAL HISTORY: negative for tobacco, alcohol, or ilicit drug use.    Allergies    No Known Allergies    Intolerances        PHYSICAL EXAMINATION:  GEN: NAD, pleasant, cooperative  CVS: RRR, no carotid bruit  CHEST: No signs of  distress, on room air  ABD: Soft, NTTP  NEURO:     MENTAL STATUS: AAOx1    LANG/SPEECH: slurred speech     CRANIAL NERVES:    II: Pupils equal and reactive    III, IV, VI: EOM intact, no gaze preference or deviation    V: normal    VII: no facial asymmetry    VIII: slurred     MOTOR: 0/5 b/l LE  ; LUE able to hold ~ 5 seconds , RUE motor intact    REFLEXES: 2/4 throughout, bilateral flexor planters    SENSORY: decreased sensation RLE    COORD: unable to assess given mental status  LABS:                        13.9   7.18  )-----------( 160      ( 29 Aug 2021 18:25 )             42.5     08-29    138  |  99  |  8<L>  ----------------------------<  110<H>  4.1   |  27  |  1.5    Ca    8.7      29 Aug 2021 18:25    TPro  6.0  /  Alb  3.5  /  TBili  1.1  /  DBili  x   /  AST  71<H>  /  ALT  16  /  AlkPhos  108  08-29    Hemoglobin A1C:   Vitamin B12   PT/INR - ( 29 Aug 2021 18:25 )   PT: 11.80 sec;   INR: 1.03 ratio         PTT - ( 29 Aug 2021 18:25 )  PTT:31.5 sec  CAPILLARY BLOOD GLUCOSE        Microbiology:      RADIOLOGY, EKG AND ADDITIONAL TESTS: Reviewed.        ASSESSMENT AND PLAN:    55 yo M with pmh of chronic pain on morphine, anxiety/depression on multiple psychiatric medications, DVT on Xarelto , HLD, HTN [facility] . The patient had a scheduled dinner planned with neighbor and was found unresponsive at home. She called the daughter which is at the bedside giving history. The patient is a poor historian and unable to give history given mental status, however he does remember that he was dizzy and he fell and was unable to get up. The daughter is unsure about any medical history and was unable to give further information. Per chart review, the patient has remote history of morphine overdose. He was code stroke in ED with NIH of 11. CTH was negative for hemorrhage/ cva. CTAHN and CT Perfusion pending. Patient gave limited review of systems , however he denies pain anywhere.     Metabolic encephalopathy 2/2 CVA vs Seizure vs. Polypharmacy     PLAN:  - Hb A1c , lipid profile  - f/u CTA HN, CT Perfusion  - DVT PPX  - continue home dose statin  - MRI Brain no contrast  - q4h neuro checks  - SBP goal 140-180  - rEEG  - Mg> 2  - Admit to telemetry    Case discussed with Dr. Zuniga              NEUROLOGY CONSULT    HPI:    55 yo M with pmh of chronic pain on morphine, anxiety/depression on multiple psychiatric medications, DVT on Xarelto , HLD, HTN presents to the hospital [facility] due to lethargy . The patient had a scheduled dinner planned with neighbor and was found unresponsive at home. She called the daughter which is at the bedside giving history. The patient is a poor historian and unable to give history given mental status, however he does remember that he was dizzy and he fell and was unable to get up. The daughter is unsure about any medical history and was unable to give further information. Per chart review, the patient has remote history of morphine overdose. He was code stroke in ED with NIH of 11. CTH was negative for hemorrhage/ cva. CTAHN and CT Perfusion pending. Patient gave limited review of systems , however he denies pain anywhere.      MEDICATIONS  Home Medications:  calcium-vitamin D:  (06 Apr 2018 10:10)  gabapentin 300 mg oral tablet: 300 milligram(s) orally 2 times a day, As Needed (06 Apr 2018 10:10)  morphine 10 mg oral tablet: 10 milligram(s) orally 3 times a day (06 Apr 2018 10:10)  PROzac 10 mg oral capsule: 1 cap(s) orally once a day (06 Apr 2018 10:10)  SEROquel:  (06 Apr 2018 10:10)  simvastatin:  (06 Apr 2018 10:10)  Valium:  (06 Apr 2018 10:10)  Xarelto 15 mg oral tablet: 1 tab(s) orally once a day (in the evening) (06 Apr 2018 10:10)    MEDICATIONS  (STANDING):    MEDICATIONS  (PRN):      FAMILY HISTORY:    SOCIAL HISTORY: negative for tobacco, alcohol, or ilicit drug use.    Allergies    No Known Allergies    Intolerances        PHYSICAL EXAMINATION:  GEN: NAD, pleasant, cooperative  CVS: RRR, no carotid bruit  CHEST: No signs of  distress, on room air  ABD: Soft, NTTP  NEURO:     MENTAL STATUS: AAOx1    LANG/SPEECH: slurred speech     CRANIAL NERVES:    II: Pupils equal and reactive    III, IV, VI: EOM intact, no gaze preference or deviation    V: normal    VII: no facial asymmetry    VIII: slurred     MOTOR: 0/5 b/l LE  ; LUE able to hold ~ 5 seconds , RUE motor intact    REFLEXES: 2/4 throughout, bilateral flexor planters    SENSORY: decreased sensation RLE    COORD: unable to assess given mental status  LABS:                        13.9   7.18  )-----------( 160      ( 29 Aug 2021 18:25 )             42.5     08-29    138  |  99  |  8<L>  ----------------------------<  110<H>  4.1   |  27  |  1.5    Ca    8.7      29 Aug 2021 18:25    TPro  6.0  /  Alb  3.5  /  TBili  1.1  /  DBili  x   /  AST  71<H>  /  ALT  16  /  AlkPhos  108  08-29    Hemoglobin A1C:   Vitamin B12   PT/INR - ( 29 Aug 2021 18:25 )   PT: 11.80 sec;   INR: 1.03 ratio         PTT - ( 29 Aug 2021 18:25 )  PTT:31.5 sec  CAPILLARY BLOOD GLUCOSE        Microbiology:      RADIOLOGY, EKG AND ADDITIONAL TESTS: Reviewed.        ASSESSMENT AND PLAN:    55 yo M with pmh of chronic pain on morphine, anxiety/depression on multiple psychiatric medications, DVT on Xarelto , HLD, HTN presents to the [facility] due to lethargy . The patient had a scheduled dinner planned with neighbor and was found unresponsive at home. She called the daughter which is at the bedside giving history. The patient is a poor historian and unable to give history given mental status, however he does remember that he was dizzy and he fell and was unable to get up. The daughter is unsure about any medical history and was unable to give further information. Per chart review, the patient has remote history of morphine overdose. He was code stroke in ED with NIH of 11. CTH was negative for hemorrhage/ cva. CTAHN and CT Perfusion pending. Patient gave limited review of systems , however he denies pain anywhere.     Metabolic encephalopathy 2/2 CVA vs Seizure vs. Polypharmacy     PLAN:  - Hb A1c , lipid profile  - f/u CTA HN, CT Perfusion  - DVT PPX  - continue home dose statin  - MRI Brain no contrast  - SBP goal 140-180  - rEEG  - Mg> 2  - Admit to telemetry    Case discussed with Dr. Zuniga

## 2021-08-29 NOTE — STROKE CODE NOTE - CT READ
Procedure(s): LEFT TOTAL KNEE ARTHROPLASTY. Anesthesia Post Evaluation Multimodal analgesia: multimodal analgesia used between 6 hours prior to anesthesia start to PACU discharge Patient location during evaluation: PACU Patient participation: complete - patient participated Level of consciousness: awake and alert Pain score: 2 Pain management: adequate Airway patency: patent Anesthetic complications: no 
Cardiovascular status: acceptable and hemodynamically stable Respiratory status: acceptable and nasal cannula Hydration status: stable Post anesthesia nausea and vomiting:  none Visit Vitals /62 Pulse 69 Temp 36.4 °C (97.5 °F) Resp 16 Ht 5' 9\" (1.753 m) Wt 88.2 kg (194 lb 6 oz) SpO2 97% BMI 28.70 kg/m²
29-Aug-2021 18:50

## 2021-08-30 LAB
A1C WITH ESTIMATED AVERAGE GLUCOSE RESULT: 5.4 % — SIGNIFICANT CHANGE UP (ref 4–5.6)
ALBUMIN SERPL ELPH-MCNC: 3.1 G/DL — LOW (ref 3.5–5.2)
ALP SERPL-CCNC: 95 U/L — SIGNIFICANT CHANGE UP (ref 30–115)
ALT FLD-CCNC: 15 U/L — SIGNIFICANT CHANGE UP (ref 0–41)
ANION GAP SERPL CALC-SCNC: 11 MMOL/L — SIGNIFICANT CHANGE UP (ref 7–14)
AST SERPL-CCNC: 69 U/L — HIGH (ref 0–41)
BASOPHILS # BLD AUTO: 0.02 K/UL — SIGNIFICANT CHANGE UP (ref 0–0.2)
BASOPHILS NFR BLD AUTO: 0.3 % — SIGNIFICANT CHANGE UP (ref 0–1)
BILIRUB SERPL-MCNC: 0.8 MG/DL — SIGNIFICANT CHANGE UP (ref 0.2–1.2)
BUN SERPL-MCNC: 6 MG/DL — LOW (ref 10–20)
CALCIUM SERPL-MCNC: 8.4 MG/DL — LOW (ref 8.5–10.1)
CHLORIDE SERPL-SCNC: 101 MMOL/L — SIGNIFICANT CHANGE UP (ref 98–110)
CHOLEST SERPL-MCNC: 186 MG/DL — SIGNIFICANT CHANGE UP
CO2 SERPL-SCNC: 27 MMOL/L — SIGNIFICANT CHANGE UP (ref 17–32)
CREAT SERPL-MCNC: 1.3 MG/DL — SIGNIFICANT CHANGE UP (ref 0.7–1.5)
EOSINOPHIL # BLD AUTO: 0.14 K/UL — SIGNIFICANT CHANGE UP (ref 0–0.7)
EOSINOPHIL NFR BLD AUTO: 2.1 % — SIGNIFICANT CHANGE UP (ref 0–8)
ESTIMATED AVERAGE GLUCOSE: 108 MG/DL — SIGNIFICANT CHANGE UP (ref 68–114)
GLUCOSE SERPL-MCNC: 101 MG/DL — HIGH (ref 70–99)
HCT VFR BLD CALC: 41.9 % — LOW (ref 42–52)
HDLC SERPL-MCNC: 30 MG/DL — LOW
HGB BLD-MCNC: 13.5 G/DL — LOW (ref 14–18)
IMM GRANULOCYTES NFR BLD AUTO: 0.3 % — SIGNIFICANT CHANGE UP (ref 0.1–0.3)
LIPID PNL WITH DIRECT LDL SERPL: 129 MG/DL — HIGH
LYMPHOCYTES # BLD AUTO: 0.97 K/UL — LOW (ref 1.2–3.4)
LYMPHOCYTES # BLD AUTO: 14.6 % — LOW (ref 20.5–51.1)
MAGNESIUM SERPL-MCNC: 2.1 MG/DL — SIGNIFICANT CHANGE UP (ref 1.8–2.4)
MCHC RBC-ENTMCNC: 28.8 PG — SIGNIFICANT CHANGE UP (ref 27–31)
MCHC RBC-ENTMCNC: 32.2 G/DL — SIGNIFICANT CHANGE UP (ref 32–37)
MCV RBC AUTO: 89.5 FL — SIGNIFICANT CHANGE UP (ref 80–94)
MONOCYTES # BLD AUTO: 0.69 K/UL — HIGH (ref 0.1–0.6)
MONOCYTES NFR BLD AUTO: 10.4 % — HIGH (ref 1.7–9.3)
NEUTROPHILS # BLD AUTO: 4.81 K/UL — SIGNIFICANT CHANGE UP (ref 1.4–6.5)
NEUTROPHILS NFR BLD AUTO: 72.3 % — SIGNIFICANT CHANGE UP (ref 42.2–75.2)
NON HDL CHOLESTEROL: 156 MG/DL — HIGH
NRBC # BLD: 0 /100 WBCS — SIGNIFICANT CHANGE UP (ref 0–0)
PLATELET # BLD AUTO: 132 K/UL — SIGNIFICANT CHANGE UP (ref 130–400)
POTASSIUM SERPL-MCNC: 4 MMOL/L — SIGNIFICANT CHANGE UP (ref 3.5–5)
POTASSIUM SERPL-SCNC: 4 MMOL/L — SIGNIFICANT CHANGE UP (ref 3.5–5)
PROT SERPL-MCNC: 5.3 G/DL — LOW (ref 6–8)
RBC # BLD: 4.68 M/UL — LOW (ref 4.7–6.1)
RBC # FLD: 14.6 % — HIGH (ref 11.5–14.5)
SODIUM SERPL-SCNC: 139 MMOL/L — SIGNIFICANT CHANGE UP (ref 135–146)
TRIGL SERPL-MCNC: 125 MG/DL — SIGNIFICANT CHANGE UP
WBC # BLD: 6.65 K/UL — SIGNIFICANT CHANGE UP (ref 4.8–10.8)
WBC # FLD AUTO: 6.65 K/UL — SIGNIFICANT CHANGE UP (ref 4.8–10.8)

## 2021-08-30 PROCEDURE — 72156 MRI NECK SPINE W/O & W/DYE: CPT | Mod: 26

## 2021-08-30 PROCEDURE — 99222 1ST HOSP IP/OBS MODERATE 55: CPT

## 2021-08-30 PROCEDURE — 72157 MRI CHEST SPINE W/O & W/DYE: CPT | Mod: 26

## 2021-08-30 RX ORDER — MORPHINE SULFATE 50 MG/1
10 CAPSULE, EXTENDED RELEASE ORAL
Qty: 0 | Refills: 0 | DISCHARGE

## 2021-08-30 RX ORDER — GABAPENTIN 400 MG/1
600 CAPSULE ORAL EVERY 12 HOURS
Refills: 0 | Status: DISCONTINUED | OUTPATIENT
Start: 2021-08-30 | End: 2021-09-01

## 2021-08-30 RX ORDER — TAMSULOSIN HYDROCHLORIDE 0.4 MG/1
0.4 CAPSULE ORAL AT BEDTIME
Refills: 0 | Status: DISCONTINUED | OUTPATIENT
Start: 2021-08-30 | End: 2021-09-01

## 2021-08-30 RX ORDER — RIVAROXABAN 15 MG-20MG
20 KIT ORAL
Refills: 0 | Status: DISCONTINUED | OUTPATIENT
Start: 2021-08-30 | End: 2021-09-01

## 2021-08-30 RX ORDER — OXYCODONE AND ACETAMINOPHEN 5; 325 MG/1; MG/1
2 TABLET ORAL EVERY 6 HOURS
Refills: 0 | Status: DISCONTINUED | OUTPATIENT
Start: 2021-08-30 | End: 2021-09-01

## 2021-08-30 RX ORDER — QUETIAPINE FUMARATE 200 MG/1
0 TABLET, FILM COATED ORAL
Qty: 0 | Refills: 0 | DISCHARGE

## 2021-08-30 RX ORDER — SIMVASTATIN 20 MG/1
0 TABLET, FILM COATED ORAL
Qty: 0 | Refills: 0 | DISCHARGE

## 2021-08-30 RX ORDER — QUETIAPINE FUMARATE 200 MG/1
0 TABLET, FILM COATED ORAL
Qty: 0 | Refills: 4 | DISCHARGE

## 2021-08-30 RX ORDER — DIAZEPAM 5 MG
0 TABLET ORAL
Qty: 0 | Refills: 0 | DISCHARGE

## 2021-08-30 RX ORDER — FLUOXETINE HCL 10 MG
10 CAPSULE ORAL DAILY
Refills: 0 | Status: DISCONTINUED | OUTPATIENT
Start: 2021-08-30 | End: 2021-09-01

## 2021-08-30 RX ORDER — LIDOCAINE 4 G/100G
1 CREAM TOPICAL DAILY
Refills: 0 | Status: DISCONTINUED | OUTPATIENT
Start: 2021-08-30 | End: 2021-09-01

## 2021-08-30 RX ORDER — SIMVASTATIN 20 MG/1
20 TABLET, FILM COATED ORAL AT BEDTIME
Refills: 0 | Status: DISCONTINUED | OUTPATIENT
Start: 2021-08-30 | End: 2021-09-01

## 2021-08-30 RX ORDER — OXYCODONE HYDROCHLORIDE 5 MG/1
20 TABLET ORAL EVERY 12 HOURS
Refills: 0 | Status: DISCONTINUED | OUTPATIENT
Start: 2021-08-30 | End: 2021-09-01

## 2021-08-30 RX ORDER — DIAZEPAM 5 MG
10 TABLET ORAL EVERY 24 HOURS
Refills: 0 | Status: DISCONTINUED | OUTPATIENT
Start: 2021-08-30 | End: 2021-09-01

## 2021-08-30 RX ORDER — KETOROLAC TROMETHAMINE 30 MG/ML
30 SYRINGE (ML) INJECTION ONCE
Refills: 0 | Status: DISCONTINUED | OUTPATIENT
Start: 2021-08-30 | End: 2021-08-30

## 2021-08-30 RX ORDER — FONDAPARINUX SODIUM 2.5 MG/.5ML
1 INJECTION, SOLUTION SUBCUTANEOUS
Qty: 0 | Refills: 0 | DISCHARGE

## 2021-08-30 RX ORDER — MIRTAZAPINE 45 MG/1
0 TABLET, ORALLY DISINTEGRATING ORAL
Qty: 0 | Refills: 1 | DISCHARGE

## 2021-08-30 RX ORDER — QUETIAPINE FUMARATE 200 MG/1
200 TABLET, FILM COATED ORAL AT BEDTIME
Refills: 0 | Status: DISCONTINUED | OUTPATIENT
Start: 2021-08-30 | End: 2021-09-01

## 2021-08-30 RX ORDER — GABAPENTIN 400 MG/1
300 CAPSULE ORAL
Qty: 0 | Refills: 0 | DISCHARGE

## 2021-08-30 RX ORDER — ACETAMINOPHEN 500 MG
650 TABLET ORAL EVERY 6 HOURS
Refills: 0 | Status: DISCONTINUED | OUTPATIENT
Start: 2021-08-30 | End: 2021-09-01

## 2021-08-30 RX ADMIN — OXYCODONE HYDROCHLORIDE 20 MILLIGRAM(S): 5 TABLET ORAL at 19:51

## 2021-08-30 RX ADMIN — Medication 10 MILLIGRAM(S): at 11:23

## 2021-08-30 RX ADMIN — Medication 30 MILLIGRAM(S): at 12:15

## 2021-08-30 RX ADMIN — LIDOCAINE 1 PATCH: 4 CREAM TOPICAL at 19:52

## 2021-08-30 RX ADMIN — RIVAROXABAN 20 MILLIGRAM(S): KIT at 18:25

## 2021-08-30 RX ADMIN — SIMVASTATIN 20 MILLIGRAM(S): 20 TABLET, FILM COATED ORAL at 23:42

## 2021-08-30 RX ADMIN — QUETIAPINE FUMARATE 200 MILLIGRAM(S): 200 TABLET, FILM COATED ORAL at 23:42

## 2021-08-30 RX ADMIN — LIDOCAINE 1 PATCH: 4 CREAM TOPICAL at 12:14

## 2021-08-30 RX ADMIN — TAMSULOSIN HYDROCHLORIDE 0.4 MILLIGRAM(S): 0.4 CAPSULE ORAL at 23:42

## 2021-08-30 RX ADMIN — Medication 30 MILLIGRAM(S): at 13:00

## 2021-08-30 RX ADMIN — OXYCODONE HYDROCHLORIDE 20 MILLIGRAM(S): 5 TABLET ORAL at 18:26

## 2021-08-30 NOTE — H&P ADULT - NSHPLABSRESULTS_GEN_ALL_CORE
LABS:                        13.9   7.18  )-----------( 160      ( 29 Aug 2021 18:25 )             42.5     08-29    138  |  99  |  8<L>  ----------------------------<  110<H>  4.1   |  27  |  1.5    Ca    8.7      29 Aug 2021 18:25    TPro  6.0  /  Alb  3.5  /  TBili  1.1  /  DBili  x   /  AST  71<H>  /  ALT  16  /  AlkPhos  108  08-29    PT/INR - ( 29 Aug 2021 18:25 )   PT: 11.80 sec;   INR: 1.03 ratio         PTT - ( 29 Aug 2021 18:25 )  PTT:31.5 sec    < from: CT Brain Stroke Protocol (08.29.21 @ 18:19) >      IMPRESSION:  No CT evidence of large territorial infarct, intracranial hemorrhage or mass effect.    < end of copied text >    < from: CT Perfusion w/ Maps w/ IV Cont (08.29.21 @ 18:43) >    IMPRESSION:  1.  No evidence of large vessel occlusion or aneurysmal dilation within the head and neck.  2.  Normal perfusion images of the brain.    < end of copied text >

## 2021-08-30 NOTE — H&P ADULT - HISTORY OF PRESENT ILLNESS
57 yo M with pmh of chronic pain on morphine with history of overdose, anxiety/depression on multiple psychiatric medications, DVT on Xarelto, HLD, HTN presents to the hospital [facility] due to lethargy and AMS. The patient had a scheduled dinner planned with neighbor and was found unresponsive at home. The patient is a poor historian and unable to give history given mental status, however he does remember that he was dizzy and he fell and was unable to get up. The daughter is unsure about any medical history and was unable to give further information. Per chart review, the patient has remote history of morphine overdose. Unknown last known well. Patient reported that he took 30mg PO morphine the morning of admission, and 2 extra tablets later that afternoon. Patient was given narcan and mental status improved somewhat, however as per daughter, this is not his baseline mental status.     In the ED, he was code stroke in ED with NIH of 11. CTH was negative for hemorrhage/CVA. Preliminary read of CTA H+N showed no evidence of large vessel occlusion or aneurysmal dilation within the head and neck . Prelim read of CT Perfusion showed normal perfusion images of the brain.    57 yo M with pmh of chronic pain on OxyFast with history of overdose, anxiety/depression on multiple psychiatric medications, DVT on Xarelto, HLD, HTN presents to the hospital [facility] due to lethargy and AMS. The patient had a scheduled dinner planned with neighbor and was found unresponsive at home. The patient is a poor historian and unable to give history given mental status, however he does remember that he was dizzy and he fell and was unable to get up. The daughter is unsure about any medical history and was unable to give further information. Per chart review, the patient has remote history of morphine overdose. Unknown last known well. Patient reported that he took 30mg PO oxyfast the morning of admission, and 2 extra tablets later that afternoon. Patient later reviewed he snorted the last tablet to get "quicker pain relief." Patient was given narcan and mental status improved somewhat, however as per daughter, this is not his baseline mental status.     In the ED, he was code stroke in ED with NIH of 11. CTH was negative for hemorrhage/CVA. Preliminary read of CTA H+N showed no evidence of large vessel occlusion or aneurysmal dilation within the head and neck . Prelim read of CT Perfusion showed normal perfusion images of the brain.    55 yo M with pmh of chronic pain on OxyFast with history of overdose, anxiety/depression on multiple psychiatric medications, DVT on Xarelto, HLD, HTN presents to the hospital [facility] due to lethargy and AMS. The patient had a scheduled dinner planned with neighbor and was found unresponsive at home. The patient is a poor historian and unable to give history given mental status, however he does remember that he was dizzy and he fell and was unable to get up. The daughter is unsure about any medical history and was unable to give further information. Per chart review, the patient has remote history of morphine overdose. Unknown last known well. Patient reported that he took 30mg PO oxyfast the morning of admission, and 2 extra tablets later that afternoon. Patient later reviewed he snorted the last tablet to get "quicker pain relief." Patient was given narcan and mental status improved somewhat.     In the ED, he was code stroke in ED with NIH of 11. CTH was negative for hemorrhage/CVA. Preliminary read of CTA H+N showed no evidence of large vessel occlusion or aneurysmal dilation within the head and neck . Prelim read of CT Perfusion showed normal perfusion images of the brain.    55 yo M with pmh of chronic pain on OxyFast with history of overdose, anxiety/depression on multiple psychiatric medications, renal cancer s/p nephrectomy,  DVT on Xarelto, HLD, HTN presents to the hospital [facility] due to lethargy and AMS. The patient had a scheduled dinner planned with neighbor and was found unresponsive at home. The patient is a poor historian and unable to give history given mental status, however he does remember that he was dizzy and he fell and was unable to get up. The daughter is unsure about any medical history and was unable to give further information. Per chart review, the patient has remote history of morphine overdose. Unknown last known well. Patient reported that he took 30mg PO oxyfast the morning of admission, and 2 extra tablets later that afternoon. Patient later reviewed he snorted the last tablet to get "quicker pain relief." Patient was given narcan and mental status improved somewhat.     In the ED, he was code stroke in ED with NIH of 11. CTH was negative for hemorrhage/CVA. Preliminary read of CTA H+N showed no evidence of large vessel occlusion or aneurysmal dilation within the head and neck . Prelim read of CT Perfusion showed normal perfusion images of the brain.

## 2021-08-30 NOTE — H&P ADULT - NSHPPHYSICALEXAM_GEN_ALL_CORE
PHYSICAL EXAM:  GENERAL: NAD, lying in bed comfortably  HEAD:  Atraumatic, Normocephalic  EYES: EOMI, conjunctiva and sclera clear  ENT: Moist mucous membranes  NECK: Supple, No JVD  CHEST/LUNG: audible bs bilaterally   HEART: Regular rate and rhythm; No murmurs, rubs, or gallops  ABDOMEN: Bowel sounds present; Soft, Nontender, distended abdomen   EXTREMITIES: moves all extremities   NERVOUS SYSTEM:  Alert & Oriented X4, speech clear.  SKIN: No rashes or lesions

## 2021-08-30 NOTE — H&P ADULT - NSHPREVIEWOFSYSTEMS_GEN_ALL_CORE
CONSTITUTIONAL: No weakness, fevers or chills, no weight loss   EYES/ENT: No visual changes;  No vertigo or throat pain   NECK: No pain or stiffness  RESPIRATORY: No cough, wheezing, hemoptysis; No shortness of breath  CARDIOVASCULAR: No chest pain or palpitations  GASTROINTESTINAL: No abdominal or epigastric pain. No nausea, vomiting, or hematemesis; No diarrhea or constipation. No melena or hematochezia.  GENITOURINARY: No dysuria, frequency or hematuria  NEUROLOGICAL: Numbness and weakness present in all extremities.  All other review of systems is negative unless indicated above.

## 2021-08-30 NOTE — H&P ADULT - NSHPSOCIALHISTORY_GEN_ALL_CORE
1 Patient currently smokes 1/2 a pack of cigarettes a day "for a long time."  Denies drinking and drug use .

## 2021-08-30 NOTE — PROGRESS NOTE ADULT - SUBJECTIVE AND OBJECTIVE BOX
Neurology Follow up note  Patient examined at bedside, noted to  be at baseline mentation currently. Reporting right >left numbness which is his baseline since several days prior to arrival. No acute complaints.      HPI:  55 yo M with pmh of chronic pain on OxyFast with history of overdose, anxiety/depression on multiple psychiatric medications, renal cancer s/p nephrectomy,  DVT on Xarelto, HLD, HTN presents to the hospital [facility] due to lethargy and AMS. The patient had a scheduled dinner planned with neighbor and was found unresponsive at home. The patient is a poor historian and unable to give history given mental status, however he does remember that he was dizzy and he fell and was unable to get up. The daughter is unsure about any medical history and was unable to give further information. Per chart review, the patient has remote history of morphine overdose. Unknown last known well. Patient reported that he took 30mg PO oxyfast the morning of admission, and 2 extra tablets later that afternoon. Patient later reviewed he snorted the last tablet to get "quicker pain relief." Patient was given narcan and mental status improved somewhat. In the ED, he was code stroke in ED with NIH of 11. CTH was negative for hemorrhage/CVA. Preliminary read of CTA H+N showed no evidence of large vessel occlusion or aneurysmal dilation within the head and neck . Prelim read of CT Perfusion showed normal perfusion images of the brain.    (30 Aug 2021 00:43)        Vital Signs Last 24 Hrs  T(C): 36.8 (29 Aug 2021 23:33), Max: 36.8 (29 Aug 2021 23:33)  T(F): 98.2 (29 Aug 2021 23:33), Max: 98.2 (29 Aug 2021 23:33)  HR: 87 (29 Aug 2021 23:33) (87 - 87)  BP: 111/60 (29 Aug 2021 23:33) (111/60 - 111/60)  BP(mean): --  RR: 20 (29 Aug 2021 23:33) (20 - 20)  SpO2: 98% (29 Aug 2021 23:33) (98% - 98%)          Neurological Exam:   Orientation: oriented to person, place time. Normal attention and comprehension.  Cranial Nerves: visual acuity intact bilaterally, visual fields full to confrontation, pupils equal round and reactive to light, extraocular motion intact, facial sensation intact symmetrically, face symmetrical, hearing was intact bilaterally, tongue and palate midline, head turning and shoulder shrug symmetric and there was no tongue deviation with protrusion.   Motor: UE 5/5              RLE 4-/5   LLE 4+/5   Sensory exam: Decreased left >right  Coordination:. No dysmetria or limb ataxia  Gait: deferred        Medications  acetaminophen   Tablet .. 650 milliGRAM(s) Oral every 6 hours PRN  diazepam  Oral Tab/Cap - Peds 10 milliGRAM(s) Oral every 24 hours PRN  FLUoxetine 10 milliGRAM(s) Oral daily  gabapentin 600 milliGRAM(s) Oral every 12 hours PRN  QUEtiapine 200 milliGRAM(s) Oral at bedtime  rivaroxaban 20 milliGRAM(s) Oral with dinner  simvastatin 20 milliGRAM(s) Oral at bedtime  tamsulosin 0.4 milliGRAM(s) Oral at bedtime      Lab  08-29    138  |  99  |  8<L>  ----------------------------<  110<H>  4.1   |  27  |  1.5    Ca    8.7      29 Aug 2021 18:25    TPro  6.0  /  Alb  3.5  /  TBili  1.1  /  DBili  x   /  AST  71<H>  /  ALT  16  /  AlkPhos  108  08-29                          13.9   7.18  )-----------( 160      ( 29 Aug 2021 18:25 )             42.5         Radiology  < from: CT Angio Neck w/ IV Cont (08.29.21 @ 18:54) >  Findings:    CTA neck:  The aortic arch, origin of the great vessels and subclavian arteries are unremarkable.  The bilateral common carotid arteries and internal carotid arteries are unremarkable without significant stenosis seen. The vertebral arteries are codominant.  No significant vertebral artery stenosis is noted.    CTA head:  The distal internal carotid arteries, middle cerebral arteries and anterior cerebral arteries are unremarkable without significant stenosis.  The basilar artery, superior cerebellar arteries and posterior cerebral arteries are unremarkable without significant stenosis.    No aneurysm or vascular malformation is identified.    Soft tissues are unremarkable. Degenerative changes of the cervical spine. Bilateral upper lobe mosaic attenuation, likely secondaryto air trapping.    CT perfusion:  The cerebral blood volume and time to peak images demonstrate no significant evidence of fixed or mismatched focal perfusion deficit or surrounding ischemic penumbra to suggest acute cerebral ischemia or infarct.    IMPRESSION:  1.  No evidence of large vessel occlusion or aneurysmal dilation within the head and neck.  2.  Normal perfusion images of the brain.      ******PRELIMINARY REPORT******    ******PRELIMINARY REPORT******          STACY DAILEY MD; Resident Radiologist    < end of copied text >     Neurology Follow up note  Patient examined at bedside, noted to  be at baseline mentation currently. Reporting right >left numbness which is his baseline since several days prior to arrival. No acute complaints.      HPI:  57 yo M with pmh of chronic pain on OxyFast with history of overdose, anxiety/depression on multiple psychiatric medications, renal cancer s/p nephrectomy,  DVT on Xarelto, HLD, HTN presents to the hospital [facility] due to lethargy and AMS. The patient had a scheduled dinner planned with neighbor and was found unresponsive at home. The patient is a poor historian and unable to give history given mental status, however he does remember that he was dizzy and he fell and was unable to get up. The daughter is unsure about any medical history and was unable to give further information. Per chart review, the patient has remote history of morphine overdose. Unknown last known well. Patient reported that he took 30mg PO oxyfast the morning of admission, and 2 extra tablets later that afternoon. Patient later reviewed he snorted the last tablet to get "quicker pain relief." Patient was given narcan and mental status improved somewhat. In the ED, he was code stroke in ED with NIH of 11. CTH was negative for hemorrhage/CVA. Preliminary read of CTA H+N showed no evidence of large vessel occlusion or aneurysmal dilation within the head and neck . Prelim read of CT Perfusion showed normal perfusion images of the brain.    (30 Aug 2021 00:43)        Vital Signs Last 24 Hrs  T(C): 36.8 (29 Aug 2021 23:33), Max: 36.8 (29 Aug 2021 23:33)  T(F): 98.2 (29 Aug 2021 23:33), Max: 98.2 (29 Aug 2021 23:33)  HR: 87 (29 Aug 2021 23:33) (87 - 87)  BP: 111/60 (29 Aug 2021 23:33) (111/60 - 111/60)  BP(mean): --  RR: 20 (29 Aug 2021 23:33) (20 - 20)  SpO2: 98% (29 Aug 2021 23:33) (98% - 98%)          Neurological Exam:   Orientation: oriented to person, place time. Normal attention and comprehension.  Cranial Nerves: visual acuity intact bilaterally, visual fields full to confrontation, pupils equal round and reactive to light, extraocular motion intact, facial sensation intact symmetrically, face symmetrical, hearing was intact bilaterally, tongue and palate midline, head turning and shoulder shrug symmetric and there was no tongue deviation with protrusion.   Motor: UE 5/5              RLE 4/5   LLE 4/5   Sensory exam: Reduced sensation for pain and temperature over RUE and bilateral lower extremities. Spinal splitting level at T6-T8.   Normal reflexes in upper and lower extremities  Coordination:. No dysmetria or limb ataxia  Gait: deferred        Medications  acetaminophen   Tablet .. 650 milliGRAM(s) Oral every 6 hours PRN  diazepam  Oral Tab/Cap - Peds 10 milliGRAM(s) Oral every 24 hours PRN  FLUoxetine 10 milliGRAM(s) Oral daily  gabapentin 600 milliGRAM(s) Oral every 12 hours PRN  QUEtiapine 200 milliGRAM(s) Oral at bedtime  rivaroxaban 20 milliGRAM(s) Oral with dinner  simvastatin 20 milliGRAM(s) Oral at bedtime  tamsulosin 0.4 milliGRAM(s) Oral at bedtime      Lab  08-29    138  |  99  |  8<L>  ----------------------------<  110<H>  4.1   |  27  |  1.5    Ca    8.7      29 Aug 2021 18:25    TPro  6.0  /  Alb  3.5  /  TBili  1.1  /  DBili  x   /  AST  71<H>  /  ALT  16  /  AlkPhos  108  08-29                          13.9   7.18  )-----------( 160      ( 29 Aug 2021 18:25 )             42.5         Radiology  < from: CT Angio Neck w/ IV Cont (08.29.21 @ 18:54) >  Findings:    CTA neck:  The aortic arch, origin of the great vessels and subclavian arteries are unremarkable.  The bilateral common carotid arteries and internal carotid arteries are unremarkable without significant stenosis seen. The vertebral arteries are codominant.  No significant vertebral artery stenosis is noted.    CTA head:  The distal internal carotid arteries, middle cerebral arteries and anterior cerebral arteries are unremarkable without significant stenosis.  The basilar artery, superior cerebellar arteries and posterior cerebral arteries are unremarkable without significant stenosis.    No aneurysm or vascular malformation is identified.    Soft tissues are unremarkable. Degenerative changes of the cervical spine. Bilateral upper lobe mosaic attenuation, likely secondaryto air trapping.    CT perfusion:  The cerebral blood volume and time to peak images demonstrate no significant evidence of fixed or mismatched focal perfusion deficit or surrounding ischemic penumbra to suggest acute cerebral ischemia or infarct.    IMPRESSION:  1.  No evidence of large vessel occlusion or aneurysmal dilation within the head and neck.  2.  Normal perfusion images of the brain.      ******PRELIMINARY REPORT******    ******PRELIMINARY REPORT******          STACY DAILEY MD; Resident Radiologist    < end of copied text >     Neurology Follow up note  Patient examined at bedside, noted to  be at baseline mentation currently. Reporting right >left numbness which is his baseline since several days prior to arrival. No acute complaints.      HPI:  55 yo M with pmh of chronic pain on OxyFast with history of overdose, anxiety/depression on multiple psychiatric medications, renal cancer s/p nephrectomy,  DVT on Xarelto, HLD, HTN presents to the hospital [facility] due to lethargy and AMS. The patient had a scheduled dinner planned with neighbor and was found unresponsive at home. The patient is a poor historian and unable to give history given mental status, however he does remember that he was dizzy and he fell and was unable to get up. The daughter is unsure about any medical history and was unable to give further information. Per chart review, the patient has remote history of morphine overdose. Unknown last known well. Patient reported that he took 30mg PO oxyfast the morning of admission, and 2 extra tablets later that afternoon. Patient later reviewed he snorted the last tablet to get "quicker pain relief." Patient was given narcan and mental status improved somewhat. In the ED, he was code stroke in ED with NIH of 11. CTH was negative for hemorrhage/CVA. Preliminary read of CTA H+N showed no evidence of large vessel occlusion or aneurysmal dilation within the head and neck . Prelim read of CT Perfusion showed normal perfusion images of the brain.    (30 Aug 2021 00:43)      Vital Signs Last 24 Hrs  T(C): 36.8 (29 Aug 2021 23:33), Max: 36.8 (29 Aug 2021 23:33)  T(F): 98.2 (29 Aug 2021 23:33), Max: 98.2 (29 Aug 2021 23:33)  HR: 87 (29 Aug 2021 23:33) (87 - 87)  BP: 111/60 (29 Aug 2021 23:33) (111/60 - 111/60)  BP(mean): --  RR: 20 (29 Aug 2021 23:33) (20 - 20)  SpO2: 98% (29 Aug 2021 23:33) (98% - 98%)      Neurological Exam:   Orientation: oriented to person, place time. Normal attention and comprehension.  Cranial Nerves: visual acuity intact bilaterally, visual fields full to confrontation, pupils equal round and reactive to light, extraocular motion intact, facial sensation intact symmetrically, face symmetrical, hearing was intact bilaterally, tongue and palate midline, head turning and shoulder shrug symmetric and there was no tongue deviation with protrusion.   Motor: UE 5/5              RLE 4/5   LLE 4/5   Sensory exam: Reduced sensation for pain and temperature over RUE and bilateral lower extremities.  Suspended sensory level at T6-T8 with preserved temperature but no pinprick in groin and upper thighs.    Normal reflexes in upper and lower extremities  Coordination:. No dysmetria or limb ataxia  Gait: deferred        Medications  acetaminophen   Tablet .. 650 milliGRAM(s) Oral every 6 hours PRN  diazepam  Oral Tab/Cap - Peds 10 milliGRAM(s) Oral every 24 hours PRN  FLUoxetine 10 milliGRAM(s) Oral daily  gabapentin 600 milliGRAM(s) Oral every 12 hours PRN  QUEtiapine 200 milliGRAM(s) Oral at bedtime  rivaroxaban 20 milliGRAM(s) Oral with dinner  simvastatin 20 milliGRAM(s) Oral at bedtime  tamsulosin 0.4 milliGRAM(s) Oral at bedtime      Lab  08-29    138  |  99  |  8<L>  ----------------------------<  110<H>  4.1   |  27  |  1.5    Ca    8.7      29 Aug 2021 18:25    TPro  6.0  /  Alb  3.5  /  TBili  1.1  /  DBili  x   /  AST  71<H>  /  ALT  16  /  AlkPhos  108  08-29                          13.9   7.18  )-----------( 160      ( 29 Aug 2021 18:25 )             42.5         Radiology  < from: CT Angio Neck w/ IV Cont (08.29.21 @ 18:54) >  Findings:    CTA neck:  The aortic arch, origin of the great vessels and subclavian arteries are unremarkable.  The bilateral common carotid arteries and internal carotid arteries are unremarkable without significant stenosis seen. The vertebral arteries are codominant.  No significant vertebral artery stenosis is noted.    CTA head:  The distal internal carotid arteries, middle cerebral arteries and anterior cerebral arteries are unremarkable without significant stenosis.  The basilar artery, superior cerebellar arteries and posterior cerebral arteries are unremarkable without significant stenosis.    No aneurysm or vascular malformation is identified.    Soft tissues are unremarkable. Degenerative changes of the cervical spine. Bilateral upper lobe mosaic attenuation, likely secondaryto air trapping.    CT perfusion:  The cerebral blood volume and time to peak images demonstrate no significant evidence of fixed or mismatched focal perfusion deficit or surrounding ischemic penumbra to suggest acute cerebral ischemia or infarct.    IMPRESSION:  1.  No evidence of large vessel occlusion or aneurysmal dilation within the head and neck.  2.  Normal perfusion images of the brain.      ******PRELIMINARY REPORT******    ******PRELIMINARY REPORT******          STACY DAILEY MD; Resident Radiologist    < end of copied text >

## 2021-08-30 NOTE — CONSULT NOTE ADULT - SUBJECTIVE AND OBJECTIVE BOX
56 M chronic pain patient secondary to back pain. Pt reportedly snorted oxycontin and was BIBEMS for altered mental status.  Pain management consulted for pain management. CT Head performed to rule out CVA. Pt is currently in MRI.

## 2021-08-30 NOTE — H&P ADULT - NSICDXPASTMEDICALHX_GEN_ALL_CORE_FT
PAST MEDICAL HISTORY:  Chronic back pain     Drug abuse     History of DVT (deep vein thrombosis)

## 2021-08-30 NOTE — CONSULT NOTE ADULT - ASSESSMENT
56 M on chronic opioid therapy for back pain. BIBEMS after snorting oxycontin.    Plan/Recommendations:  -Continue patient's chronic opioid regimen on a PRN regimen in order to avoid withdrawal  -Rest of management per primary team.

## 2021-08-30 NOTE — PROGRESS NOTE ADULT - ASSESSMENT
57 yo M with pmh of chronic pain on morphine, anxiety/depression on multiple psychiatric medications, DVT on Xarelto , HLD, HTN remote history of morphine overdose, brought to ED  due to lethargy followed by unresponsiveness which was preceded with dizziness. He was code stroke in ED with NIH of 11. CTH was negative for hemorrhage/ cva. CTAH/N and CT Perfusion negative for LVO or perfusion deficit. Patient currently at his baseline mentation and his symptoms could have been due to the effect of opioids.      PLAN:  - Follow up pending MRI Brain no contrast  - Obtain REEG  - Neuro attending note will follow   55 yo M with pmh of chronic pain on morphine, anxiety/depression on multiple psychiatric medications, DVT on Xarelto , HLD, HTN remote history of morphine overdose, brought to ED  due to lethargy followed by unresponsiveness which was preceded with dizziness. He was code stroke in ED with NIH of 11. CTH was negative for hemorrhage/ cva. CTAH/N and CT Perfusion negative for LVO or perfusion deficit. Patient currently at his baseline mentation and his symptoms could have been due to the effect of opioids. Patient complains of back pain and bilateral LE weakness. Exam significant for reduced pain and temperature sensation over right upper and bilateral LE. Splitting at T6-T8 dermatome. Intact vibration and proprioception. Reflexes normal.      PLAN:  - Given numbness and reduced temperature and pain sensation spinal cord pathology should be rules out  - MR cervical and thoracic spine w/ wo contrast  - Follow up pending MRI Brain no contrast  - Obtain REEG  - PT consult  - Check PVR for possible urinary retention     Discussed with Dr. Lopez     55 yo M with pmh of chronic pain on morphine, anxiety/depression on multiple psychiatric medications, DVT on Xarelto , HLD, HTN remote history of morphine overdose, brought to ED  due to lethargy followed by unresponsiveness which was preceded with dizziness. He was code stroke in ED with NIH of 11. CTH was negative for hemorrhage/ cva. CTAH/N and CT Perfusion negative for LVO or perfusion deficit. Patient currently at his baseline mentation and his symptoms could have been due to the effect of opioids. Patient complains of back pain and bilateral LE weakness. Exam significant for reduced pain and temperature sensation over right upper and bilateral LE. Splitting at T6-T8 dermatome. Intact vibration and proprioception. Reflexes normal.      PLAN:  Given numbness and reduced temperature and pain sensation spinal cord pathology should be rules out  - MR cervical and thoracic spine w/ wo contrast  - Follow up pending MRI Brain no contrast  - Obtain REEG  - PT consult  - Check PVR for possible urinary retention     Discussed with Dr. Lopez     55 yo M with pmh of chronic pain on morphine, anxiety/depression on multiple psychiatric medications, DVT on Xarelto , HLD, HTN remote history of morphine overdose, brought to ED  due to lethargy followed by unresponsiveness which was preceded with dizziness. He was code stroke in ED with NIH of 11. CTH was negative for hemorrhage/ cva. CTAH/N and CT Perfusion negative for LVO or perfusion deficit. Patient currently at his baseline mentation and his symptoms could have been due to the effect of opioids. Patient complains of back pain and bilateral LE weakness. Exam significant for reduced pain and temperature sensation over right upper and bilateral LE. Splitting at T6-T8 dermatome. Intact vibration and proprioception. Reflexes normal.      PLAN:  Given numbness and reduced temperature and pain sensation spinal cord pathology should be rules out  - MR cervical and thoracic spine w/ wo contrast  - Follow up pending MRI Brain no contrast  - Obtain REEG  - PT consult  - Check PVR for possible urinary retention   - Pain management consult    Discussed with Dr. Lopez     57 yo M with pmh of chronic pain on morphine, anxiety/depression on multiple psychiatric medications, DVT on Xarelto , HLD, HTN remote history of morphine overdose, brought to ED  due to lethargy followed by unresponsiveness which was preceded with dizziness. He was code stroke in ED with NIH of 11. CTH was negative for hemorrhage/ cva. CTAH/N and CT Perfusion negative for LVO or perfusion deficit. Patient currently at his baseline mentation and his symptoms could have been due to the effect of opioids. Patient complains of back pain and bilateral LE weakness. Exam significant for reduced pain and temperature sensation over right upper and bilateral LE. Splitting at T6-T8 dermatome. Intact vibration and proprioception. Reflexes normal.  Pt presentation and exam findings inconsistent.  Recommend r/o pathologic process in spine.      PLAN:  Given numbness and reduced temperature and pain sensation spinal cord pathology should be ruled out  - MR cervical and thoracic spine w/ wo contrast  - Follow up pending MRI Brain no contrast  - Obtain REEG  - PT consult  - Check PVR for possible urinary retention   - Pain management consult    Discussed with Dr. Lopez

## 2021-08-30 NOTE — H&P ADULT - ASSESSMENT
57 yo M with pmh of chronic pain on morphine with history of overdose, anxiety/depression on multiple psychiatric medications, DVT on Xarelto, HLD, HTN presents to the hospital [facility] due to lethargy and AMS.     # AMS possibly secondary to opiate overdose vs seizure vs CVA  # Metabolic encephalopathy   - NIHSS 11 on admission, stroke code called   - CTH was negative for hemorrhage/CVA. Preliminary read of CTA H+N showed no evidence of large vessel occlusion or aneurysmal dilation within the head and neck . Prelim read of CT Perfusion showed normal perfusion images of the brain. Follow up final read of CTA H+N and perfusion.  - obtain HbA1c, lipid profile  - obtain rEEG  - Neurology following   - admit to tele     # Chronic back pain on PO morphine   - patient recieved narcan on this admission with some improvement of sxs. Monitor for pain caution with opiates.     # DVT - continue with xarelto, spoke to neurology, OK to continue for now.   # HLD - continue with home dose statin  # HTN - maintain -180 as per neuro, will hold HTN meds for now.     # DVT ppx: xarelto   # Diet: DASH  # GI ppx: Not indicated   # Dispo: Acute      57 yo M with pmh of chronic pain on morphine with history of overdose, anxiety/depression on multiple psychiatric medications, DVT on Xarelto, HLD, HTN presents to the hospital [facility] due to lethargy and AMS.     # AMS possibly secondary to opiate overdose vs seizure vs CVA  # Metabolic encephalopathy   - NIHSS 11 on admission, stroke code called   - CTH was negative for hemorrhage/CVA. Preliminary read of CTA H+N showed no evidence of large vessel occlusion or aneurysmal dilation within the head and neck . Prelim read of CT Perfusion showed normal perfusion images of the brain. Follow up final read of CTA H+N and perfusion.  - obtain HbA1c, lipid profile  - obtain rEEG  - Neurology following   - admit to tele     # Chronic back pain on PO morphine   - patient recieved narcan on this admission with some improvement of sxs.  - On my exam, patient did not appear actively in pain. Endorses snorting the last oxycodone prior to admission. Monitor for pain, caution with opiates.     # DVT - continue with xarelto, spoke to neurology, OK to continue for now.   # HLD - continue with home dose statin  # HTN - maintain -180 as per neuro, will hold HTN meds for now.   # Active smoker - patient refused nicotine patch during this admission     # DVT ppx: xarelto   # Diet: DASH  # GI ppx: Not indicated   # Dispo: Acute      57 yo M with pmh of chronic pain on morphine with history of overdose, anxiety/depression on multiple psychiatric medications, DVT on Xarelto, HLD, HTN presents to the hospital [facility] due to lethargy and AMS.     # AMS possibly secondary to opiate overdose vs seizure vs CVA  # Metabolic encephalopathy   - NIHSS 11 on admission, stroke code called   - CTH was negative for hemorrhage/CVA. Preliminary read of CTA H+N showed no evidence of large vessel occlusion or aneurysmal dilation within the head and neck . Prelim read of CT Perfusion showed normal perfusion images of the brain. Follow up final read of CTA H+N and perfusion.  - obtain HbA1c, lipid profile  - obtain rEEG  - Neurology following   - admit to tele     # Chronic back pain on PO morphine   - patient recieved narcan on this admission with some improvement of sxs.  - On my exam, patient did not appear actively in pain. Endorses snorting the last oxycodone prior to admission. Monitor for pain, caution with opiates.   - continue with home dose gabapentin     # Depression/Anxiety  - continue with fluoxetine 10mg PO daily  - continue with seroquel 200mg PO qHS     # DVT - continue with xarelto, spoke to neurology, OK to continue for now.   # HLD - continue with home dose statin  # HTN - maintain -180 as per neuro, will hold HTN meds for now.   # Active smoker - patient refused nicotine patch during this admission     # DVT ppx: xarelto   # Diet: DASH  # GI ppx: Not indicated   # Dispo: Acute      55 yo M with pmh of chronic pain on morphine with history of overdose, anxiety/depression on multiple psychiatric medications, DVT on Xarelto, HLD, HTN presents to the hospital [facility] due to lethargy and AMS.     # AMS possibly secondary to opiate overdose vs seizure vs CVA  # Metabolic encephalopathy   - NIHSS 11 on admission, stroke code called   - CTH was negative for hemorrhage/CVA. Preliminary read of CTA H+N showed no evidence of large vessel occlusion or aneurysmal dilation within the head and neck . Prelim read of CT Perfusion showed normal perfusion images of the brain. Follow up final read of CTA H+N and perfusion.  - obtain HbA1c, lipid profile  - obtain rEEG  - Neurology following   - admit to tele     # Chronic back pain on PO morphine   - patient recieved narcan on this admission with some improvement of sxs.  - On my exam, patient did not appear actively in pain. Endorses snorting the last oxycodone prior to admission. Monitor for pain, caution with opiates.   - continue with home dose gabapentin     # Depression/Anxiety  - continue with fluoxetine 10mg PO daily  - continue with seroquel 200mg PO qHS     # h/o renal cancer s/p nephrectomy   - Cr 1.5 on admission, 1.2 in 2009, likely progression of CKD. Monitor Cr.    # DVT - continue with xarelto, spoke to neurology, OK to continue for now.   # HLD - continue with home dose statin  # HTN - maintain -180 as per neuro, will hold HTN meds for now.   # Active smoker - patient refused nicotine patch during this admission     # DVT ppx: xarelto   # Diet: DASH  # GI ppx: Not indicated   # Dispo: Acute   # Code status: DNR/DNI

## 2021-08-31 LAB
ALBUMIN SERPL ELPH-MCNC: 3.1 G/DL — LOW (ref 3.5–5.2)
ALP SERPL-CCNC: 82 U/L — SIGNIFICANT CHANGE UP (ref 30–115)
ALT FLD-CCNC: 13 U/L — SIGNIFICANT CHANGE UP (ref 0–41)
ANION GAP SERPL CALC-SCNC: 10 MMOL/L — SIGNIFICANT CHANGE UP (ref 7–14)
AST SERPL-CCNC: 44 U/L — HIGH (ref 0–41)
BASOPHILS # BLD AUTO: 0.03 K/UL — SIGNIFICANT CHANGE UP (ref 0–0.2)
BASOPHILS NFR BLD AUTO: 0.5 % — SIGNIFICANT CHANGE UP (ref 0–1)
BILIRUB SERPL-MCNC: 1 MG/DL — SIGNIFICANT CHANGE UP (ref 0.2–1.2)
BUN SERPL-MCNC: 7 MG/DL — LOW (ref 10–20)
CALCIUM SERPL-MCNC: 7.9 MG/DL — LOW (ref 8.5–10.1)
CHLORIDE SERPL-SCNC: 101 MMOL/L — SIGNIFICANT CHANGE UP (ref 98–110)
CO2 SERPL-SCNC: 28 MMOL/L — SIGNIFICANT CHANGE UP (ref 17–32)
COVID-19 SPIKE DOMAIN AB INTERP: POSITIVE
COVID-19 SPIKE DOMAIN ANTIBODY RESULT: >250 U/ML — HIGH
CREAT SERPL-MCNC: 1.1 MG/DL — SIGNIFICANT CHANGE UP (ref 0.7–1.5)
EOSINOPHIL # BLD AUTO: 0.15 K/UL — SIGNIFICANT CHANGE UP (ref 0–0.7)
EOSINOPHIL NFR BLD AUTO: 2.3 % — SIGNIFICANT CHANGE UP (ref 0–8)
GLUCOSE SERPL-MCNC: 90 MG/DL — SIGNIFICANT CHANGE UP (ref 70–99)
HCT VFR BLD CALC: 38.4 % — LOW (ref 42–52)
HGB BLD-MCNC: 12.5 G/DL — LOW (ref 14–18)
IMM GRANULOCYTES NFR BLD AUTO: 0.2 % — SIGNIFICANT CHANGE UP (ref 0.1–0.3)
LYMPHOCYTES # BLD AUTO: 1.42 K/UL — SIGNIFICANT CHANGE UP (ref 1.2–3.4)
LYMPHOCYTES # BLD AUTO: 22.2 % — SIGNIFICANT CHANGE UP (ref 20.5–51.1)
MCHC RBC-ENTMCNC: 28.9 PG — SIGNIFICANT CHANGE UP (ref 27–31)
MCHC RBC-ENTMCNC: 32.6 G/DL — SIGNIFICANT CHANGE UP (ref 32–37)
MCV RBC AUTO: 88.9 FL — SIGNIFICANT CHANGE UP (ref 80–94)
MONOCYTES # BLD AUTO: 0.68 K/UL — HIGH (ref 0.1–0.6)
MONOCYTES NFR BLD AUTO: 10.6 % — HIGH (ref 1.7–9.3)
NEUTROPHILS # BLD AUTO: 4.1 K/UL — SIGNIFICANT CHANGE UP (ref 1.4–6.5)
NEUTROPHILS NFR BLD AUTO: 64.2 % — SIGNIFICANT CHANGE UP (ref 42.2–75.2)
NRBC # BLD: 0 /100 WBCS — SIGNIFICANT CHANGE UP (ref 0–0)
PLATELET # BLD AUTO: 151 K/UL — SIGNIFICANT CHANGE UP (ref 130–400)
POTASSIUM SERPL-MCNC: 3.6 MMOL/L — SIGNIFICANT CHANGE UP (ref 3.5–5)
POTASSIUM SERPL-SCNC: 3.6 MMOL/L — SIGNIFICANT CHANGE UP (ref 3.5–5)
PROT SERPL-MCNC: 5.4 G/DL — LOW (ref 6–8)
RBC # BLD: 4.32 M/UL — LOW (ref 4.7–6.1)
RBC # FLD: 14.5 % — SIGNIFICANT CHANGE UP (ref 11.5–14.5)
SARS-COV-2 IGG+IGM SERPL QL IA: >250 U/ML — HIGH
SARS-COV-2 IGG+IGM SERPL QL IA: POSITIVE
SODIUM SERPL-SCNC: 139 MMOL/L — SIGNIFICANT CHANGE UP (ref 135–146)
WBC # BLD: 6.39 K/UL — SIGNIFICANT CHANGE UP (ref 4.8–10.8)
WBC # FLD AUTO: 6.39 K/UL — SIGNIFICANT CHANGE UP (ref 4.8–10.8)

## 2021-08-31 PROCEDURE — 99232 SBSQ HOSP IP/OBS MODERATE 35: CPT

## 2021-08-31 RX ADMIN — SIMVASTATIN 20 MILLIGRAM(S): 20 TABLET, FILM COATED ORAL at 21:50

## 2021-08-31 RX ADMIN — OXYCODONE AND ACETAMINOPHEN 2 TABLET(S): 5; 325 TABLET ORAL at 15:32

## 2021-08-31 RX ADMIN — LIDOCAINE 1 PATCH: 4 CREAM TOPICAL at 11:30

## 2021-08-31 RX ADMIN — RIVAROXABAN 20 MILLIGRAM(S): KIT at 17:14

## 2021-08-31 RX ADMIN — OXYCODONE HYDROCHLORIDE 20 MILLIGRAM(S): 5 TABLET ORAL at 05:39

## 2021-08-31 RX ADMIN — TAMSULOSIN HYDROCHLORIDE 0.4 MILLIGRAM(S): 0.4 CAPSULE ORAL at 21:50

## 2021-08-31 RX ADMIN — LIDOCAINE 1 PATCH: 4 CREAM TOPICAL at 20:24

## 2021-08-31 RX ADMIN — Medication 10 MILLIGRAM(S): at 11:30

## 2021-08-31 RX ADMIN — QUETIAPINE FUMARATE 200 MILLIGRAM(S): 200 TABLET, FILM COATED ORAL at 21:50

## 2021-08-31 RX ADMIN — OXYCODONE HYDROCHLORIDE 20 MILLIGRAM(S): 5 TABLET ORAL at 17:14

## 2021-08-31 RX ADMIN — OXYCODONE AND ACETAMINOPHEN 2 TABLET(S): 5; 325 TABLET ORAL at 17:00

## 2021-08-31 RX ADMIN — OXYCODONE HYDROCHLORIDE 20 MILLIGRAM(S): 5 TABLET ORAL at 05:38

## 2021-08-31 RX ADMIN — OXYCODONE AND ACETAMINOPHEN 2 TABLET(S): 5; 325 TABLET ORAL at 23:32

## 2021-08-31 NOTE — PROGRESS NOTE ADULT - ASSESSMENT
55 yo M with pmh of chronic pain on morphine, anxiety/depression on multiple psychiatric medications, DVT on Xarelto , HLD, HTN remote history of morphine overdose, brought to ED  due to lethargy followed by unresponsiveness which was preceded with dizziness. He was code stroke in ED with NIH of 11. CTH was negative for hemorrhage/ cva. CTAH/N and CT Perfusion negative for LVO or perfusion deficit. Patient currently at his baseline mentation and his symptoms could have been due to the effect of opioids. Patient complains of back pain and bilateral LE weakness. Exam significant for reduced pain and temperature sensation over right upper and bilateral LE. Splitting at T6-T8 dermatome. Intact vibration and proprioception. Reflexes normal.  Pt presentation and exam findings inconsistent.       PLAN:  Given numbness and reduced temperature and pain sensation spinal cord pathology was a concern. MR cervical and thoracic spine with and w/o contrast failed to show any spinal pathology. REEG negative.  Symptoms are less likely related to underlying neurological process. Can check TSH, vitamin B12, folate.  Might need EMG as an out patient if recurrence of symptoms.  Will transfer the patient under Medicine care for PT/ Pain management and further workup.    Discussed with Dr. Lopez

## 2021-08-31 NOTE — PHYSICAL THERAPY INITIAL EVALUATION ADULT - GENERAL OBSERVATIONS, REHAB EVAL
Pt encountered in the bed, NAD, c/o 8/10 pain in R buttock, agreeable for b/s PT IE/tx with encouragement. Amisha. well to tx. Pt left in the bed post tx all needs within reach.

## 2021-08-31 NOTE — PHYSICAL THERAPY INITIAL EVALUATION ADULT - SIT-TO-STAND BALANCE
VN note: Patient's progress notes, labs, and care plan reviewed. Will be available as needed.    good minus

## 2021-08-31 NOTE — PROGRESS NOTE ADULT - SUBJECTIVE AND OBJECTIVE BOX
OVERNIGHT EVENTS:  Patient seen and examined today. BAck pain is better, motor function improved, still complaining of right sided upper extremity numbness, nad bilateral LLE that he thinks is improving.    HPI  57 yo M with pmh of chronic pain on OxyFast with history of overdose, anxiety/depression on multiple psychiatric medications, renal cancer s/p nephrectomy,  DVT on Xarelto, HLD, HTN presents to the hospital [facility] due to lethargy and AMS. The patient had a scheduled dinner planned with neighbor and was found unresponsive at home. The patient is a poor historian and unable to give history given mental status, however he does remember that he was dizzy and he fell and was unable to get up. The daughter is unsure about any medical history and was unable to give further information. Per chart review, the patient has remote history of morphine overdose. Unknown last known well. Patient reported that he took 30mg PO oxyfast the morning of admission, and 2 extra tablets later that afternoon. Patient later reviewed he snorted the last tablet to get "quicker pain relief." Patient was given narcan and mental status improved somewhat. In the ED, he was code stroke in ED with NIH of 11. CTH was negative for hemorrhage/CVA. Preliminary read of CTA H+N showed no evidence of large vessel occlusion or aneurysmal dilation within the head and neck . Prelim read of CT Perfusion showed normal perfusion images of the brain.     MEDICATIONS  (STANDING):  FLUoxetine 10 milliGRAM(s) Oral daily  lidocaine   4% Patch 1 Patch Transdermal daily  oxyCODONE  ER Tablet 20 milliGRAM(s) Oral every 12 hours  QUEtiapine 200 milliGRAM(s) Oral at bedtime  rivaroxaban 20 milliGRAM(s) Oral with dinner  simvastatin 20 milliGRAM(s) Oral at bedtime  tamsulosin 0.4 milliGRAM(s) Oral at bedtime    MEDICATIONS  (PRN):  acetaminophen   Tablet .. 650 milliGRAM(s) Oral every 6 hours PRN Moderate Pain (4 - 6)  diazepam  Oral Tab/Cap - Peds 10 milliGRAM(s) Oral every 24 hours PRN insomnia  gabapentin 600 milliGRAM(s) Oral every 12 hours PRN pain  oxycodone    5 mG/acetaminophen 325 mG 2 Tablet(s) Oral every 6 hours PRN Severe Pain (7 - 10)      Allergies    No Known Allergies    Intolerances        Vital Signs Last 24 Hrs  T(C): 37.3 (31 Aug 2021 13:00), Max: 37.6 (30 Aug 2021 19:44)  T(F): 99.1 (31 Aug 2021 13:00), Max: 99.6 (30 Aug 2021 19:44)  HR: 68 (31 Aug 2021 13:00) (68 - 83)  BP: 114/72 (31 Aug 2021 13:00) (102/59 - 117/71)  BP(mean): --  RR: 20 (31 Aug 2021 13:00) (16 - 20)  SpO2: 96% (30 Aug 2021 19:16) (96% - 96%)    Physical exam:  General: No acute distress, awake and alert    Neurologic:  Orientation: oriented to person, place time. Normal attention and comprehension.  Cranial Nerves: visual acuity intact bilaterally, visual fields full to confrontation, pupils equal round and reactive to light, extraocular motion intact, facial sensation intact symmetrically, face symmetrical, hearing was intact bilaterally, tongue and palate midline, head turning and shoulder shrug symmetric and there was no tongue deviation with protrusion.   Motor: UE 5/5              RLE 4/5   LLE 4/5   Sensory exam: Reduced sensation for pain and temperature over RUE and bilateral lower extremities.  Suspended sensory level at T6-T8 with preserved temperature but no pinprick in groin and upper thighs.    Normal reflexes in upper and lower extremities  Coordination:. No dysmetria or limb ataxia  Gait: deferred    LABS:                        12.5   6.39  )-----------( 151      ( 31 Aug 2021 06:01 )             38.4     08-31    139  |  101  |  7<L>  ----------------------------<  90  3.6   |  28  |  1.1    Ca    7.9<L>      31 Aug 2021 06:01  Mg     2.1     08-30    TPro  5.4<L>  /  Alb  3.1<L>  /  TBili  1.0  /  DBili  x   /  AST  44<H>  /  ALT  13  /  AlkPhos  82  08-31    PT/INR - ( 29 Aug 2021 18:25 )   PT: 11.80 sec;   INR: 1.03 ratio         PTT - ( 29 Aug 2021 18:25 )  PTT:31.5 sec      RADIOLOGY & ADDITIONAL TESTS:    CT Angio Neck w/ IV Cont (08.29.21 @ 18:54) >  Findings:    CTA neck:  The aortic arch, origin of the great vessels and subclavian arteries are unremarkable.  The bilateral common carotid arteries and internal carotid arteries are unremarkable without significant stenosis seen. The vertebral arteries are codominant.  No significant vertebral artery stenosis is noted.    CTA head:  The distal internal carotid arteries, middle cerebral arteries and anterior cerebral arteries are unremarkable without significant stenosis.  The basilar artery, superior cerebellar arteries and posterior cerebral arteries are unremarkable without significant stenosis.    No aneurysm or vascular malformation is identified.    Soft tissues are unremarkable. Degenerative changes of the cervical spine. Bilateral upper lobe mosaic attenuation, likely secondaryto air trapping.    CT perfusion:  The cerebral blood volume and time to peak images demonstrate no significant evidence of fixed or mismatched focal perfusion deficit or surrounding ischemic penumbra to suggest acute cerebral ischemia or infarct.    IMPRESSION:  1.  No evidence of large vessel occlusion or aneurysmal dilation within the head and neck.  2.  Normal perfusion images of the brain.      MR Thoracic Spine w/wo IV Cont (08.30.21 @ 18:52) >  THORACIC SPINE:  Examination is limited due to motion.  VERTEBRAL BODIES/ALIGNMENT: There is thoracolumbar dextro scoliosis.  Hemangiomas are noted at the T2 and T8 vertebral bodies..  SPINAL CORD: There is no discrete abnormal signal involving the spinal cord or abnormal enhancement identified.  SPINAL CANAL:  No other intradural or extradural defects are seen.  INTERVERTEBRAL DISCS:  The disc spaces are well-maintained..  MISCELLANEOUS:  None.        CERVICAL SPINE:  Motion limited examination.  Mild multilevel degenerative changes without significant spinal canal or neuroforaminal narrowing.  No abnormal cord signal or enhancement.    THORACIC SPINE:  Motion limited examination.  There is no discrete evidence of abnormal cord signal or enhancement though evaluation of the cord is slightly limited due to motion. Consider repeat examination under sedation as clinically warranted.

## 2021-08-31 NOTE — PHYSICAL THERAPY INITIAL EVALUATION ADULT - SPECIFY REASON(S)
Chart reviewed. Pt. currently on bedrest. PT evaluation on hold pending OOB orders as appropriate. Will follow.
Pt's Chart reviewed & currently on active bedrest orders as of 8/29/21. PT evaluation on hold pending OOB orders as appropriate. Will f/u as appropriate.
61.2

## 2021-08-31 NOTE — PHYSICAL THERAPY INITIAL EVALUATION ADULT - PERTINENT HX OF CURRENT PROBLEM, REHAB EVAL
56 M chronic pain patient secondary to back pain. Pt reportedly snorted oxycontin and was BIBEMS for altered mental status.  Pain management consulted for pain management. CT Head performed to rule out CVA.

## 2021-09-01 ENCOUNTER — TRANSCRIPTION ENCOUNTER (OUTPATIENT)
Age: 56
End: 2021-09-01

## 2021-09-01 VITALS
TEMPERATURE: 98 F | DIASTOLIC BLOOD PRESSURE: 70 MMHG | RESPIRATION RATE: 18 BRPM | HEART RATE: 70 BPM | SYSTOLIC BLOOD PRESSURE: 116 MMHG

## 2021-09-01 LAB
ANION GAP SERPL CALC-SCNC: 8 MMOL/L — SIGNIFICANT CHANGE UP (ref 7–14)
BASOPHILS # BLD AUTO: 0.03 K/UL — SIGNIFICANT CHANGE UP (ref 0–0.2)
BASOPHILS NFR BLD AUTO: 0.6 % — SIGNIFICANT CHANGE UP (ref 0–1)
BUN SERPL-MCNC: 7 MG/DL — LOW (ref 10–20)
CALCIUM SERPL-MCNC: 8.1 MG/DL — LOW (ref 8.5–10.1)
CHLORIDE SERPL-SCNC: 101 MMOL/L — SIGNIFICANT CHANGE UP (ref 98–110)
CO2 SERPL-SCNC: 31 MMOL/L — SIGNIFICANT CHANGE UP (ref 17–32)
CREAT SERPL-MCNC: 1.2 MG/DL — SIGNIFICANT CHANGE UP (ref 0.7–1.5)
EOSINOPHIL # BLD AUTO: 0.24 K/UL — SIGNIFICANT CHANGE UP (ref 0–0.7)
EOSINOPHIL NFR BLD AUTO: 4.5 % — SIGNIFICANT CHANGE UP (ref 0–8)
FOLATE SERPL-MCNC: 9.6 NG/ML — SIGNIFICANT CHANGE UP
GLUCOSE SERPL-MCNC: 85 MG/DL — SIGNIFICANT CHANGE UP (ref 70–99)
HCT VFR BLD CALC: 37.6 % — LOW (ref 42–52)
HGB BLD-MCNC: 12.1 G/DL — LOW (ref 14–18)
IMM GRANULOCYTES NFR BLD AUTO: 0.2 % — SIGNIFICANT CHANGE UP (ref 0.1–0.3)
LYMPHOCYTES # BLD AUTO: 1.59 K/UL — SIGNIFICANT CHANGE UP (ref 1.2–3.4)
LYMPHOCYTES # BLD AUTO: 29.6 % — SIGNIFICANT CHANGE UP (ref 20.5–51.1)
MAGNESIUM SERPL-MCNC: 2 MG/DL — SIGNIFICANT CHANGE UP (ref 1.8–2.4)
MCHC RBC-ENTMCNC: 28.7 PG — SIGNIFICANT CHANGE UP (ref 27–31)
MCHC RBC-ENTMCNC: 32.2 G/DL — SIGNIFICANT CHANGE UP (ref 32–37)
MCV RBC AUTO: 89.3 FL — SIGNIFICANT CHANGE UP (ref 80–94)
MONOCYTES # BLD AUTO: 0.54 K/UL — SIGNIFICANT CHANGE UP (ref 0.1–0.6)
MONOCYTES NFR BLD AUTO: 10 % — HIGH (ref 1.7–9.3)
NEUTROPHILS # BLD AUTO: 2.97 K/UL — SIGNIFICANT CHANGE UP (ref 1.4–6.5)
NEUTROPHILS NFR BLD AUTO: 55.1 % — SIGNIFICANT CHANGE UP (ref 42.2–75.2)
NRBC # BLD: 0 /100 WBCS — SIGNIFICANT CHANGE UP (ref 0–0)
PLATELET # BLD AUTO: 160 K/UL — SIGNIFICANT CHANGE UP (ref 130–400)
POTASSIUM SERPL-MCNC: 4 MMOL/L — SIGNIFICANT CHANGE UP (ref 3.5–5)
POTASSIUM SERPL-SCNC: 4 MMOL/L — SIGNIFICANT CHANGE UP (ref 3.5–5)
RBC # BLD: 4.21 M/UL — LOW (ref 4.7–6.1)
RBC # FLD: 14.5 % — SIGNIFICANT CHANGE UP (ref 11.5–14.5)
SODIUM SERPL-SCNC: 140 MMOL/L — SIGNIFICANT CHANGE UP (ref 135–146)
TSH SERPL-MCNC: 1.39 UIU/ML — SIGNIFICANT CHANGE UP (ref 0.27–4.2)
VIT B12 SERPL-MCNC: 744 PG/ML — SIGNIFICANT CHANGE UP (ref 232–1245)
WBC # BLD: 5.38 K/UL — SIGNIFICANT CHANGE UP (ref 4.8–10.8)
WBC # FLD AUTO: 5.38 K/UL — SIGNIFICANT CHANGE UP (ref 4.8–10.8)

## 2021-09-01 RX ADMIN — LIDOCAINE 1 PATCH: 4 CREAM TOPICAL at 03:50

## 2021-09-01 RX ADMIN — OXYCODONE HYDROCHLORIDE 20 MILLIGRAM(S): 5 TABLET ORAL at 05:36

## 2021-09-01 RX ADMIN — Medication 10 MILLIGRAM(S): at 11:13

## 2021-09-01 NOTE — DISCHARGE NOTE NURSING/CASE MANAGEMENT/SOCIAL WORK - PATIENT PORTAL LINK FT
You can access the FollowMyHealth Patient Portal offered by [facility] by registering at the following website: [email or web address]. By joining [facility]’s FollowMyHealth portal, you will also be able to view your health information using other applications (apps) compatible with our system.

## 2021-09-01 NOTE — DISCHARGE NOTE NURSING/CASE MANAGEMENT/SOCIAL WORK - NSDCPEFALRISK_GEN_ALL_CORE
For information on Fall & injury Prevention, visit [email or web address]

## 2021-09-01 NOTE — DISCHARGE NOTE PROVIDER - HOSPITAL COURSE
55 yo M with pmh of chronic pain on morphine, anxiety/depression on multiple psychiatric medications, DVT on Xarelto , HLD, HTN remote history of morphine overdose, brought to ED  due to lethargy followed by unresponsiveness which was preceded with dizziness. He was code stroke in ED with NIH of 11. CTH was negative for hemorrhage/ cva. CTAH/N and CT Perfusion negative for LVO or perfusion deficit. Patient currently at his baseline mentation and his symptoms could have been due to the effect of opioids. Patient complains of back pain and bilateral LE weakness. Pt presentation and exam findings inconsistent.   Given numbness and reduced temperature and pain sensation spinal cord pathology was a concern. MR cervical and thoracic spine with and w/o contrast failed to show any spinal pathology. REEG negative.  Symptoms are less likely related to underlying neurological process.  Might need EMG as an out patient if recurrence of symptoms.  Pt is supposed to followup with pain management out patient as well as primary doctor and if symptoms reoccur then can follow up with neuro outpt

## 2021-09-01 NOTE — DISCHARGE NOTE PROVIDER - NSDCFUADDAPPT_GEN_ALL_CORE_FT
Please follow up with your pain management doctor and your primary doctor for your recent admission and ongoing treatment   If symptoms reoccur please follow up with Neurologist Dr. Lopez out patient

## 2021-09-01 NOTE — DISCHARGE NOTE PROVIDER - NSDCMRMEDTOKEN_GEN_ALL_CORE_FT
calcium-vitamin D:   DIAZEPAM 10 MG TABLET: TAKE 1 TO 2 TABLETS BY MOUTH AT BEDTIME  GABAPENTIN 600MG TABLET: TAKE 1 TABLET BY MOUTH TWICE DAILY  OXYCODONE HCL 20MG TABLET: TAKE 1 TABLET BY MOUTH THREE TIMES A DAY  MAX 3 TABLETS PER DAY  PROzac 10 mg oral capsule: 1 cap(s) orally once a day  QUETIAPINE FUMARATE 200 MG TAB: 1 each orally once a day (at bedtime)  SIMVASTATIN 20 MG TABLET: TAKE 1 TABLET(S) BY MOUTH ONCE A DAY FOR 90 DAYS  TAMSULOSIN   CAP 0.4:   XARELTO 20 MG TABLET: TAKE 1 TABLET BY MOUTH EVERY DAY WITH FOOD

## 2021-09-01 NOTE — DISCHARGE NOTE PROVIDER - CARE PROVIDER_API CALL
Jaspal Lopez)  Neuromuscular Medicine  [address] [place] [address], [address]  [place], [place] [id number]  Phone: [phone]  Fax: [phone]  Follow Up Time:

## 2021-12-31 NOTE — ED ADULT NURSE NOTE - CAS EDP DISCH DISPOSITION ADMI
Alert and oriented to person, place, time/situation. normal mood and affect. no apparent risk to self or others. Telemetry

## 2022-05-12 NOTE — ED PROVIDER NOTE - NS ED NOTE AC HIGH RISK COUNTRIES
Patient presents to the urgent care today with her daughter present. Patient stated that starting yesterday she has had a pain across her chest at the bottom of her rib cage. Patient denies any changed in bowel habits, denies increased pain with eating, or lack of appetite. Patient stated that she only ate half of her dinner last night and that isn't normal for her. She then stated that she hasn't eaten at all today. Discussed with the provider, patient has a significant cardiac history, ER would be better for her to rule out cardiac need. Patient sees providers at local hospital, patient's daughter and patient agreeable to this. Would like to go by personal vehicle.   No

## 2023-09-23 NOTE — ED ADULT TRIAGE NOTE - RESPIRATORY RATE (BREATHS/MIN)
72-year-old male PMH of HTN, HLD, T2DM, BPH, age, prior CVA in 2017 and 2021 uncertain of residual deficits, HFpEF reportedly on 20mg lasix QD (TTE from 2022 EF 59%) presented to the ED with multifocal PNA. Course c/b new a fib with RVR starting 9/22 at 0600, likely secondary to infectious process.    Recommendations:  - Continue tele monitoring. Converted to NSR overnight, having some intermittent rapid AF but maintaining NSR rates ~60-90s. Pt feeling well. AF likely in setting of multifocal PNA.   - Recommend AAD with Amiodarone to maintain sinus rhythm. Would recommend 400mg BID at this time to 10g load.   -- Pt will need routine TFTs/LFTs/PFTs/opthalmology eval while on Amiodarone therapy. This was discussed with patient/wife.   -- Refusing Amiodarone at this time without reviewing with card/Dr. Bird. Will reach out to cardiology.    - On HFNC, Pulm following, appreciate recs.   - Diltiazem and Digoxin discontinue.   - Continue Metoprolol, if able to take oral, would transition to oral Metoprolol.   - Now on AC with Eliquis 5mg BID, continue. CHADSVASC 6.   - Keep on tele. Keep K>4, Mg>2  - Discussed with EP attending and primary team.    16

## 2023-12-02 ENCOUNTER — INPATIENT (INPATIENT)
Facility: HOSPITAL | Age: 58
LOS: 12 days | Discharge: HOME | DRG: 881 | End: 2023-12-15
Attending: PSYCHIATRY & NEUROLOGY | Admitting: PSYCHIATRY & NEUROLOGY
Payer: MEDICARE

## 2023-12-02 VITALS
TEMPERATURE: 97 F | HEART RATE: 72 BPM | OXYGEN SATURATION: 97 % | RESPIRATION RATE: 20 BRPM | WEIGHT: 229.94 LBS | DIASTOLIC BLOOD PRESSURE: 90 MMHG | SYSTOLIC BLOOD PRESSURE: 120 MMHG

## 2023-12-02 DIAGNOSIS — F32.A DEPRESSION, UNSPECIFIED: ICD-10-CM

## 2023-12-02 DIAGNOSIS — R45.851 SUICIDAL IDEATIONS: ICD-10-CM

## 2023-12-02 DIAGNOSIS — Z90.5 ACQUIRED ABSENCE OF KIDNEY: Chronic | ICD-10-CM

## 2023-12-02 LAB
ALBUMIN SERPL ELPH-MCNC: 3.9 G/DL — SIGNIFICANT CHANGE UP (ref 3.5–5.2)
ALBUMIN SERPL ELPH-MCNC: 3.9 G/DL — SIGNIFICANT CHANGE UP (ref 3.5–5.2)
ALP SERPL-CCNC: 107 U/L — SIGNIFICANT CHANGE UP (ref 30–115)
ALP SERPL-CCNC: 107 U/L — SIGNIFICANT CHANGE UP (ref 30–115)
ALT FLD-CCNC: 12 U/L — SIGNIFICANT CHANGE UP (ref 0–41)
ALT FLD-CCNC: 12 U/L — SIGNIFICANT CHANGE UP (ref 0–41)
ANION GAP SERPL CALC-SCNC: 9 MMOL/L — SIGNIFICANT CHANGE UP (ref 7–14)
ANION GAP SERPL CALC-SCNC: 9 MMOL/L — SIGNIFICANT CHANGE UP (ref 7–14)
APAP SERPL-MCNC: <5 UG/ML — LOW (ref 10–30)
APAP SERPL-MCNC: <5 UG/ML — LOW (ref 10–30)
APPEARANCE UR: CLEAR — SIGNIFICANT CHANGE UP
APPEARANCE UR: CLEAR — SIGNIFICANT CHANGE UP
AST SERPL-CCNC: 12 U/L — SIGNIFICANT CHANGE UP (ref 0–41)
AST SERPL-CCNC: 12 U/L — SIGNIFICANT CHANGE UP (ref 0–41)
BASOPHILS # BLD AUTO: 0.07 K/UL — SIGNIFICANT CHANGE UP (ref 0–0.2)
BASOPHILS # BLD AUTO: 0.07 K/UL — SIGNIFICANT CHANGE UP (ref 0–0.2)
BASOPHILS NFR BLD AUTO: 0.9 % — SIGNIFICANT CHANGE UP (ref 0–1)
BASOPHILS NFR BLD AUTO: 0.9 % — SIGNIFICANT CHANGE UP (ref 0–1)
BILIRUB SERPL-MCNC: 0.9 MG/DL — SIGNIFICANT CHANGE UP (ref 0.2–1.2)
BILIRUB SERPL-MCNC: 0.9 MG/DL — SIGNIFICANT CHANGE UP (ref 0.2–1.2)
BILIRUB UR-MCNC: ABNORMAL
BILIRUB UR-MCNC: ABNORMAL
BUN SERPL-MCNC: 10 MG/DL — SIGNIFICANT CHANGE UP (ref 10–20)
BUN SERPL-MCNC: 10 MG/DL — SIGNIFICANT CHANGE UP (ref 10–20)
CALCIUM SERPL-MCNC: 9.1 MG/DL — SIGNIFICANT CHANGE UP (ref 8.4–10.5)
CALCIUM SERPL-MCNC: 9.1 MG/DL — SIGNIFICANT CHANGE UP (ref 8.4–10.5)
CHLORIDE SERPL-SCNC: 105 MMOL/L — SIGNIFICANT CHANGE UP (ref 98–110)
CHLORIDE SERPL-SCNC: 105 MMOL/L — SIGNIFICANT CHANGE UP (ref 98–110)
CO2 SERPL-SCNC: 26 MMOL/L — SIGNIFICANT CHANGE UP (ref 17–32)
CO2 SERPL-SCNC: 26 MMOL/L — SIGNIFICANT CHANGE UP (ref 17–32)
COLOR SPEC: SIGNIFICANT CHANGE UP
COLOR SPEC: SIGNIFICANT CHANGE UP
CREAT SERPL-MCNC: 1.4 MG/DL — SIGNIFICANT CHANGE UP (ref 0.7–1.5)
CREAT SERPL-MCNC: 1.4 MG/DL — SIGNIFICANT CHANGE UP (ref 0.7–1.5)
DIFF PNL FLD: NEGATIVE — SIGNIFICANT CHANGE UP
DIFF PNL FLD: NEGATIVE — SIGNIFICANT CHANGE UP
EGFR: 58 ML/MIN/1.73M2 — LOW
EGFR: 58 ML/MIN/1.73M2 — LOW
EOSINOPHIL # BLD AUTO: 0.13 K/UL — SIGNIFICANT CHANGE UP (ref 0–0.7)
EOSINOPHIL # BLD AUTO: 0.13 K/UL — SIGNIFICANT CHANGE UP (ref 0–0.7)
EOSINOPHIL NFR BLD AUTO: 1.7 % — SIGNIFICANT CHANGE UP (ref 0–8)
EOSINOPHIL NFR BLD AUTO: 1.7 % — SIGNIFICANT CHANGE UP (ref 0–8)
ETHANOL SERPL-MCNC: <10 MG/DL — SIGNIFICANT CHANGE UP
ETHANOL SERPL-MCNC: <10 MG/DL — SIGNIFICANT CHANGE UP
GLUCOSE SERPL-MCNC: 95 MG/DL — SIGNIFICANT CHANGE UP (ref 70–99)
GLUCOSE SERPL-MCNC: 95 MG/DL — SIGNIFICANT CHANGE UP (ref 70–99)
GLUCOSE UR QL: NEGATIVE MG/DL — SIGNIFICANT CHANGE UP
GLUCOSE UR QL: NEGATIVE MG/DL — SIGNIFICANT CHANGE UP
HCT VFR BLD CALC: 49.7 % — SIGNIFICANT CHANGE UP (ref 42–52)
HCT VFR BLD CALC: 49.7 % — SIGNIFICANT CHANGE UP (ref 42–52)
HGB BLD-MCNC: 16.6 G/DL — SIGNIFICANT CHANGE UP (ref 14–18)
HGB BLD-MCNC: 16.6 G/DL — SIGNIFICANT CHANGE UP (ref 14–18)
IMM GRANULOCYTES NFR BLD AUTO: 0.3 % — SIGNIFICANT CHANGE UP (ref 0.1–0.3)
IMM GRANULOCYTES NFR BLD AUTO: 0.3 % — SIGNIFICANT CHANGE UP (ref 0.1–0.3)
KETONES UR-MCNC: NEGATIVE MG/DL — SIGNIFICANT CHANGE UP
KETONES UR-MCNC: NEGATIVE MG/DL — SIGNIFICANT CHANGE UP
LEUKOCYTE ESTERASE UR-ACNC: NEGATIVE — SIGNIFICANT CHANGE UP
LEUKOCYTE ESTERASE UR-ACNC: NEGATIVE — SIGNIFICANT CHANGE UP
LYMPHOCYTES # BLD AUTO: 1.45 K/UL — SIGNIFICANT CHANGE UP (ref 1.2–3.4)
LYMPHOCYTES # BLD AUTO: 1.45 K/UL — SIGNIFICANT CHANGE UP (ref 1.2–3.4)
LYMPHOCYTES # BLD AUTO: 18.5 % — LOW (ref 20.5–51.1)
LYMPHOCYTES # BLD AUTO: 18.5 % — LOW (ref 20.5–51.1)
MCHC RBC-ENTMCNC: 30.6 PG — SIGNIFICANT CHANGE UP (ref 27–31)
MCHC RBC-ENTMCNC: 30.6 PG — SIGNIFICANT CHANGE UP (ref 27–31)
MCHC RBC-ENTMCNC: 33.4 G/DL — SIGNIFICANT CHANGE UP (ref 32–37)
MCHC RBC-ENTMCNC: 33.4 G/DL — SIGNIFICANT CHANGE UP (ref 32–37)
MCV RBC AUTO: 91.5 FL — SIGNIFICANT CHANGE UP (ref 80–94)
MCV RBC AUTO: 91.5 FL — SIGNIFICANT CHANGE UP (ref 80–94)
MONOCYTES # BLD AUTO: 0.43 K/UL — SIGNIFICANT CHANGE UP (ref 0.1–0.6)
MONOCYTES # BLD AUTO: 0.43 K/UL — SIGNIFICANT CHANGE UP (ref 0.1–0.6)
MONOCYTES NFR BLD AUTO: 5.5 % — SIGNIFICANT CHANGE UP (ref 1.7–9.3)
MONOCYTES NFR BLD AUTO: 5.5 % — SIGNIFICANT CHANGE UP (ref 1.7–9.3)
NEUTROPHILS # BLD AUTO: 5.74 K/UL — SIGNIFICANT CHANGE UP (ref 1.4–6.5)
NEUTROPHILS # BLD AUTO: 5.74 K/UL — SIGNIFICANT CHANGE UP (ref 1.4–6.5)
NEUTROPHILS NFR BLD AUTO: 73.1 % — SIGNIFICANT CHANGE UP (ref 42.2–75.2)
NEUTROPHILS NFR BLD AUTO: 73.1 % — SIGNIFICANT CHANGE UP (ref 42.2–75.2)
NITRITE UR-MCNC: NEGATIVE — SIGNIFICANT CHANGE UP
NITRITE UR-MCNC: NEGATIVE — SIGNIFICANT CHANGE UP
NRBC # BLD: 0 /100 WBCS — SIGNIFICANT CHANGE UP (ref 0–0)
NRBC # BLD: 0 /100 WBCS — SIGNIFICANT CHANGE UP (ref 0–0)
PH UR: 5 — SIGNIFICANT CHANGE UP (ref 5–8)
PH UR: 5 — SIGNIFICANT CHANGE UP (ref 5–8)
PLATELET # BLD AUTO: 191 K/UL — SIGNIFICANT CHANGE UP (ref 130–400)
PLATELET # BLD AUTO: 191 K/UL — SIGNIFICANT CHANGE UP (ref 130–400)
PMV BLD: 10.8 FL — HIGH (ref 7.4–10.4)
PMV BLD: 10.8 FL — HIGH (ref 7.4–10.4)
POTASSIUM SERPL-MCNC: 4.5 MMOL/L — SIGNIFICANT CHANGE UP (ref 3.5–5)
POTASSIUM SERPL-MCNC: 4.5 MMOL/L — SIGNIFICANT CHANGE UP (ref 3.5–5)
POTASSIUM SERPL-SCNC: 4.5 MMOL/L — SIGNIFICANT CHANGE UP (ref 3.5–5)
POTASSIUM SERPL-SCNC: 4.5 MMOL/L — SIGNIFICANT CHANGE UP (ref 3.5–5)
PROT SERPL-MCNC: 6 G/DL — SIGNIFICANT CHANGE UP (ref 6–8)
PROT SERPL-MCNC: 6 G/DL — SIGNIFICANT CHANGE UP (ref 6–8)
PROT UR-MCNC: SIGNIFICANT CHANGE UP MG/DL
PROT UR-MCNC: SIGNIFICANT CHANGE UP MG/DL
RBC # BLD: 5.43 M/UL — SIGNIFICANT CHANGE UP (ref 4.7–6.1)
RBC # BLD: 5.43 M/UL — SIGNIFICANT CHANGE UP (ref 4.7–6.1)
RBC # FLD: 14 % — SIGNIFICANT CHANGE UP (ref 11.5–14.5)
RBC # FLD: 14 % — SIGNIFICANT CHANGE UP (ref 11.5–14.5)
SALICYLATES SERPL-MCNC: <0.3 MG/DL — LOW (ref 4–30)
SALICYLATES SERPL-MCNC: <0.3 MG/DL — LOW (ref 4–30)
SARS-COV-2 RNA SPEC QL NAA+PROBE: SIGNIFICANT CHANGE UP
SARS-COV-2 RNA SPEC QL NAA+PROBE: SIGNIFICANT CHANGE UP
SODIUM SERPL-SCNC: 140 MMOL/L — SIGNIFICANT CHANGE UP (ref 135–146)
SODIUM SERPL-SCNC: 140 MMOL/L — SIGNIFICANT CHANGE UP (ref 135–146)
SP GR SPEC: 1.03 — SIGNIFICANT CHANGE UP (ref 1–1.03)
SP GR SPEC: 1.03 — SIGNIFICANT CHANGE UP (ref 1–1.03)
UROBILINOGEN FLD QL: 1 MG/DL — SIGNIFICANT CHANGE UP (ref 0.2–1)
UROBILINOGEN FLD QL: 1 MG/DL — SIGNIFICANT CHANGE UP (ref 0.2–1)
WBC # BLD: 7.84 K/UL — SIGNIFICANT CHANGE UP (ref 4.8–10.8)
WBC # BLD: 7.84 K/UL — SIGNIFICANT CHANGE UP (ref 4.8–10.8)
WBC # FLD AUTO: 7.84 K/UL — SIGNIFICANT CHANGE UP (ref 4.8–10.8)
WBC # FLD AUTO: 7.84 K/UL — SIGNIFICANT CHANGE UP (ref 4.8–10.8)

## 2023-12-02 PROCEDURE — 85610 PROTHROMBIN TIME: CPT

## 2023-12-02 PROCEDURE — 85730 THROMBOPLASTIN TIME PARTIAL: CPT

## 2023-12-02 PROCEDURE — 97162 PT EVAL MOD COMPLEX 30 MIN: CPT | Mod: GP

## 2023-12-02 PROCEDURE — 70450 CT HEAD/BRAIN W/O DYE: CPT | Mod: 26,MA

## 2023-12-02 PROCEDURE — 80307 DRUG TEST PRSMV CHEM ANLYZR: CPT

## 2023-12-02 PROCEDURE — 93970 EXTREMITY STUDY: CPT

## 2023-12-02 PROCEDURE — 83036 HEMOGLOBIN GLYCOSYLATED A1C: CPT

## 2023-12-02 PROCEDURE — 80354 DRUG SCREENING FENTANYL: CPT

## 2023-12-02 PROCEDURE — 80061 LIPID PANEL: CPT

## 2023-12-02 PROCEDURE — 99285 EMERGENCY DEPT VISIT HI MDM: CPT | Mod: FS

## 2023-12-02 PROCEDURE — 90686 IIV4 VACC NO PRSV 0.5 ML IM: CPT

## 2023-12-02 PROCEDURE — 84443 ASSAY THYROID STIM HORMONE: CPT

## 2023-12-02 PROCEDURE — 72110 X-RAY EXAM L-2 SPINE 4/>VWS: CPT

## 2023-12-02 PROCEDURE — 93010 ELECTROCARDIOGRAM REPORT: CPT

## 2023-12-02 RX ORDER — IBUPROFEN 200 MG
600 TABLET ORAL ONCE
Refills: 0 | Status: COMPLETED | OUTPATIENT
Start: 2023-12-02 | End: 2023-12-02

## 2023-12-02 RX ORDER — ACETAMINOPHEN 500 MG
650 TABLET ORAL EVERY 6 HOURS
Refills: 0 | Status: DISCONTINUED | OUTPATIENT
Start: 2023-12-02 | End: 2023-12-15

## 2023-12-02 RX ORDER — INFLUENZA VIRUS VACCINE 15; 15; 15; 15 UG/.5ML; UG/.5ML; UG/.5ML; UG/.5ML
0.5 SUSPENSION INTRAMUSCULAR ONCE
Refills: 0 | Status: COMPLETED | OUTPATIENT
Start: 2023-12-02 | End: 2023-12-12

## 2023-12-02 RX ORDER — DIPHENHYDRAMINE HCL 50 MG
25 CAPSULE ORAL AT BEDTIME
Refills: 0 | Status: DISCONTINUED | OUTPATIENT
Start: 2023-12-02 | End: 2023-12-04

## 2023-12-02 RX ORDER — HALOPERIDOL DECANOATE 100 MG/ML
5 INJECTION INTRAMUSCULAR EVERY 8 HOURS
Refills: 0 | Status: DISCONTINUED | OUTPATIENT
Start: 2023-12-02 | End: 2023-12-15

## 2023-12-02 RX ORDER — SODIUM CHLORIDE 9 MG/ML
1000 INJECTION INTRAMUSCULAR; INTRAVENOUS; SUBCUTANEOUS ONCE
Refills: 0 | Status: COMPLETED | OUTPATIENT
Start: 2023-12-02 | End: 2023-12-02

## 2023-12-02 RX ORDER — FLUOXETINE HCL 10 MG
20 CAPSULE ORAL DAILY
Refills: 0 | Status: DISCONTINUED | OUTPATIENT
Start: 2023-12-02 | End: 2023-12-05

## 2023-12-02 RX ADMIN — Medication 600 MILLIGRAM(S): at 21:47

## 2023-12-02 RX ADMIN — Medication 600 MILLIGRAM(S): at 18:26

## 2023-12-02 RX ADMIN — SODIUM CHLORIDE 1000 MILLILITER(S): 9 INJECTION INTRAMUSCULAR; INTRAVENOUS; SUBCUTANEOUS at 20:21

## 2023-12-02 NOTE — ED BEHAVIORAL HEALTH ASSESSMENT NOTE - SUMMARY
The patient is a 58-year-old male; ; domiciled alone; has 2 adult daughters; on disability/pension >10 years for injury while at work; self-reported PPHx of depression, denies prior ED visits or admissions, hx of SA; PMHx of renal cancer, back pain; BIB EMS; psychiatry consulted for SI.  Pt with worsening depression and decline in functioning over the past year, currently with SI.  He requires psychiatric admission for safety and stabilization, which he is amenable to.

## 2023-12-02 NOTE — ED BEHAVIORAL HEALTH ASSESSMENT NOTE - DETAILS
deferred will d/w RN; telepsych team to give handoff to MD in the morning BTCM d/w pt's ex-wife hx of SA by taking pills years ago; reports recent SI with plan to OD on meds

## 2023-12-02 NOTE — ED PROVIDER NOTE - ATTENDING APP SHARED VISIT CONTRIBUTION OF CARE
58-year-old male past medical history of bipolar, depression presents via EMS from his home accompanied by his ex-wife, Machelle who states that she has not heard from patient in a while and he was not answering his phone so she called for well check.  Patient was found laying in his bed.  House was reported to be a mess.  Patient states he has not left his house in 1 year.  Patient states 1 year ago he fell and injured his teeth and has not left his house since then.  Per The patient he is afraid to go out and has had suicidal thoughts without a plan.  Machelle states that he does not really keep in contact with his children.  Patient complains of bilateral feet pain.  On exam patient in NAD, AAOx3, appears disheveled, no signs of head trauma, no midline vertebral tenderness, lungs CTA B/L, abdomen soft nontender nondistended, moves all extremities, good tone equal strength

## 2023-12-02 NOTE — ED BEHAVIORAL HEALTH ASSESSMENT NOTE - RISK ASSESSMENT
risk factors: male, lives alone, not engaged in tx, +SI    protective factors: domiciled, denies access to guns

## 2023-12-02 NOTE — ED BEHAVIORAL HEALTH ASSESSMENT NOTE - DESCRIPTION
see BH note as per HPI renal cancer per chart, back pain, pt reports hx of using CPAP but has not used in years, per collateral pt has hx of lap band surgery

## 2023-12-02 NOTE — ED BEHAVIORAL HEALTH ASSESSMENT NOTE - HPI (INCLUDE ILLNESS QUALITY, SEVERITY, DURATION, TIMING, CONTEXT, MODIFYING FACTORS, ASSOCIATED SIGNS AND SYMPTOMS)
The patient is a 58-year-old male; ; domiciled alone; has 2 adult daughters; on disability/pension >10 years for injury while at work; self-reported PPHx of depression, denies prior ED visits or admissions, hx of SA; PMHx of renal cancer, back pain; BIB EMS; psychiatry consulted for SI.  Pt states that The patient is a 58-year-old male; ; domiciled alone; has 2 adult daughters; on disability/pension >10 years for injury while at work; self-reported PPHx of depression, denies prior ED visits or admissions, hx of SA; PMHx of renal cancer, back pain; BIB EMS; psychiatry consulted for SI.  Pt states that he fell about 1 year ago and broke some teeth, has not left the house since then because he is afraid to go out.  He states he is "tired of living," feels he has "nothing in life" and doesn't expect this to change.  He reports depressed mood, sleep disturbance, sometimes feels tired, anhedonia, eats 1 meal/day (delivered to the apt).  Pt states that he "wants it to end" and has thought of killing himself recently by taking all the pills in his house.  He states he came close to acting on it but did not, noting that when he woke up a few days later he no longer had that thought.  He reports "feeling lost" and "doesn't want to go on."  He states that he needs help because he "can't function on his own" and feels there is "no way he is going to survive."  He states he has not been tending to his apt or ADLs.  He believes one of his disability/pension payments must have stopped because some bills haven't been paid, has not seen a doctor in over 1.5 years, has not been taking any medications for medical/psych/pain reasons.  He gives consent for [facility] to speak with his ex-wife and brothers (though doesn't have their numbers at this time).  He does not want his daughter contacted as he does not want to upset her.      Covid Screen - Patient  denies positive test in past 3 months  denies recent exposures

## 2023-12-02 NOTE — BH PATIENT PROFILE - STATED REASON FOR ADMISSION
Patient came in for depression and suicidal ideation. Claims he has not left his house in a year and has been getting increasingly depressed.

## 2023-12-02 NOTE — ED BEHAVIORAL HEALTH NOTE - BEHAVIORAL HEALTH NOTE
============     ED COURSE     ============     SOURCE: Chart review and ED doctor       ARRIVAL: EMS after ex-wife called in a wellness check      BELONGINGS: Nothing of note in pt’s belongings.     BEHAVIOR: Pt was compliant with good cooperation for entire process of wanding/search/ and gowning and was escorted to the  area with no issues. Patient provided routine blood work willingly and pending urine collection. Pt was brought in after his ex-wife called in a wellness check after he stopped responding to her calls/text and stopped paying the cellphone bill for his daughters. Hygiene is poor, pt is disheveled and reported not taking care of his ADL’s. Denies HI, endorses SI stating he has given up on life. Pt’s affect is depressed, speech is at a normal rate, thoughts are linear, eye contact is fair, denies AVH/delusions, no aggression or behavioral issues and is AOX4.      TREATMENT: Motrin 600mg oral      VISITORS: Ex-wife was at bedside

## 2023-12-02 NOTE — BH PATIENT PROFILE - FALL HARM RISK - UNIVERSAL INTERVENTIONS
Bed in lowest position, wheels locked, appropriate side rails in place/Call bell, personal items and telephone in reach/Instruct patient to call for assistance before getting out of bed or chair/Non-slip footwear when patient is out of bed/[place] to call system/Physically safe environment - no spills, clutter or unnecessary equipment/Purposeful Proactive Rounding/Room/bathroom lighting operational, light cord in reach Bed in lowest position, wheels locked, appropriate side rails in place/Call bell, personal items and telephone in reach/Instruct patient to call for assistance before getting out of bed or chair/Non-slip footwear when patient is out of bed/[place] to call system/Physically safe environment - no spills, clutter or unnecessary equipment/Purposeful Proactive Rounding/Room/bathroom lighting operational, light cord in reach Bed in lowest position, wheels locked, appropriate side rails in place/Call bell, personal items and telephone in reach/Instruct patient to call for assistance before getting out of bed or chair/Non-slip footwear when patient is out of bed/[place] to call system/Physically safe environment - no spills, clutter or unnecessary equipment/Purposeful Proactive Rounding/Room/bathroom lighting operational, light cord in reach

## 2023-12-02 NOTE — BH PATIENT PROFILE - FUNCTIONAL ASSESSMENT - DAILY ACTIVITY 1.
LM with nurse/MA for Dr. Wander Mills to get specifics of hydration protocol.
NICHOLE unable to see uploaded protocol through care everywhere. Uploaded by Robert Betancur. Pt given fax number and will request protocol be faxed to office. Once faxed, protocol must be added to CT order.
Patient needs CTA, and due to CKD, nephrologist recommends infusion prior to studies with dye. Asks that we coordinate through nephrology office (notify them), and they use a \"DMG OP hydration\" \"infusion supportive care\" protocol that includes saline hydration, to be infused prior to the CTA.     Nephrologist Dr. Demetrice Khalil [phone]
Received faxed hydration orders from [address] [place] [address]. Orders stated 500 ml of 0.9% normal saline over 1 hour 2 hours prior to CT scan. Orders placed into CT order. Copy of orders sent to scanning. RN called Cheryl with central scheduling and she updated orders and will call patient due to need to r/s during hours where staff available for hydration orders. Cheryl will call pt.
Spoke to Chava at Dr. Yu Milks office. Order has been placed for the hydration protocol before patients CTA. She states the order was placed and sent yesterday and today.
4 = No assist / stand by assistance

## 2023-12-02 NOTE — ED PROVIDER NOTE - OBJECTIVE STATEMENT
58-year-old male past medical history of anxiety, MDD, DVT on Xarelto, hypertension, hyperlipidemia presents for evaluation.  Patient states that over a year ago he fell when he stepped outside his house and since that time he has decided to stay inside his house.  Patient states that he gave up on life and has increased thoughts of suicide for the past 6 months.  Patient ex-wife states she called a well check on him because no one had seen him or heard from him in months and when she went to see him his house was disheveled and he was just laying in bed.  Patient endorses mild aching pain in his feet.  Denies fever, headache, chest pain, shortness of breath, weakness, numbness, dysuria, hematuria, vomiting, diarrhea.

## 2023-12-02 NOTE — ED PROVIDER NOTE - CLINICAL SUMMARY MEDICAL DECISION MAKING FREE TEXT BOX
Patient was medically cleared for psychiatric evaluation.  Patient's ex-wife Machelle can be reached at [phone].  Patient was seen by telepsych and plan is for voluntary admission.
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
## 2023-12-02 NOTE — BH PATIENT PROFILE - HOME MEDICATIONS
QUETIAPINE FUMARATE 200 MG TAB , 1 each orally once a day (at bedtime)  SIMVASTATIN 20 MG TABLET , TAKE 1 TABLET(S) BY MOUTH ONCE A DAY FOR 90 DAYS  XARELTO 20 MG TABLET , TAKE 1 TABLET BY MOUTH EVERY DAY WITH FOOD  TAMSULOSIN   CAP 0.4  DIAZEPAM 10 MG TABLET , TAKE 1 TO 2 TABLETS BY MOUTH AT BEDTIME  GABAPENTIN 600MG TABLET , TAKE 1 TABLET BY MOUTH TWICE DAILY  OXYCODONE HCL 20MG TABLET , TAKE 1 TABLET BY MOUTH THREE TIMES A DAY  MAX 3 TABLETS PER DAY  calcium-vitamin D  PROzac 10 mg oral capsule , 1 cap(s) orally once a day

## 2023-12-02 NOTE — ED PROVIDER NOTE - PHYSICAL EXAMINATION
CONST: Pt tearful on exam  EYES: PERRL, EOMI, Sclera and conjunctiva clear.   ENT: No nasal discharge. Oropharynx normal appearing, no erythema or exudates. No abscess or swelling. Uvula midline.   NECK: Non-tender, no meningeal signs. normal ROM. supple   CARD: S1 S2; No jvd  RESP: Equal BS B/L, No wheezes, rhonchi or rales. No distress  GI: Soft, non-tender, non-distended. no cva tenderness. normal BS  MS: Tenderness to b/l plantar fascia. Normal ROM in all extremities. pulses 2 +. no calf tenderness or swelling  SKIN: Warm, dry, no acute rashes. Good turgor  NEURO: A&Ox3, No focal deficits. Strength 5/5 with no sensory deficits.

## 2023-12-03 LAB
CHOLEST SERPL-MCNC: 167 MG/DL — SIGNIFICANT CHANGE UP
CHOLEST SERPL-MCNC: 167 MG/DL — SIGNIFICANT CHANGE UP
HDLC SERPL-MCNC: 36 MG/DL — LOW
HDLC SERPL-MCNC: 36 MG/DL — LOW
LIPID PNL WITH DIRECT LDL SERPL: 115 MG/DL — HIGH
LIPID PNL WITH DIRECT LDL SERPL: 115 MG/DL — HIGH
NON HDL CHOLESTEROL: 131 MG/DL — HIGH
NON HDL CHOLESTEROL: 131 MG/DL — HIGH
TRIGL SERPL-MCNC: 79 MG/DL — SIGNIFICANT CHANGE UP
TRIGL SERPL-MCNC: 79 MG/DL — SIGNIFICANT CHANGE UP

## 2023-12-03 PROCEDURE — 99232 SBSQ HOSP IP/OBS MODERATE 35: CPT

## 2023-12-03 RX ORDER — NICOTINE POLACRILEX 2 MG
2 GUM BUCCAL
Refills: 0 | Status: DISCONTINUED | OUTPATIENT
Start: 2023-12-03 | End: 2023-12-15

## 2023-12-03 RX ADMIN — Medication 20 MILLIGRAM(S): at 09:14

## 2023-12-03 NOTE — BH INPATIENT PSYCHIATRY ASSESSMENT NOTE - NSSUICRSKFACTOR_PSY_ALL_CORE
Current and Past Psychiatric Diagnoses/Presenting Symptoms/Treatment Related Factors/Activating Events/Stressors Current and Past Psychiatric Diagnoses/Presenting Symptoms/Treatment Related Factors

## 2023-12-03 NOTE — BH INPATIENT PSYCHIATRY ASSESSMENT NOTE - NS ED [facility] REVIEW OF ED CHART AVAILABLE LABS REVIEWED
Message to Dr Rachel: Dr Brantley is deferring Furosemide 40 mg 1 tablet daily Rx to you. Please review latest lab from 6/22/21 for permission for refills. Upcoming appointment is scheduled 9/2/2021 1:00 PM-will remind patient to get lab prior to appointment.   Yes

## 2023-12-03 NOTE — BH INPATIENT PSYCHIATRY ASSESSMENT NOTE - NSCOMMENTSUICRISKFACT_PSY_ALL_CORE
risk factors of previous diagnosis, lack of treatment and currently not being in tx are in part mitigated by his help seeking behavior and hx of response to medication.

## 2023-12-03 NOTE — BH INPATIENT PSYCHIATRY ASSESSMENT NOTE - NSBHCHARTREVIEWLAB_PSY_A_CORE FT
16.6   7.84  )-----------( 191      ( 02 Dec 2023 16:55 )             49.7   12-02    140  |  105  |  10  ----------------------------<  95  4.5   |  26  |  1.4    Ca    9.1      02 Dec 2023 16:55    TPro  6.0  /  Alb  3.9  /  TBili  0.9  /  DBili  x   /  AST  12  /  ALT  12  /  AlkPhos  107  12-02

## 2023-12-03 NOTE — BH INPATIENT PSYCHIATRY ASSESSMENT NOTE - NSBHCHARTREVIEWVS_PSY_A_CORE FT
Vital Signs Last 24 Hrs  T(C): 35.7 (12-03-23 @ 08:25), Max: 35.9 (12-02-23 @ 16:09)  T(F): 96.3 (12-03-23 @ 08:25), Max: 96.7 (12-02-23 @ 22:13)  HR: 90 (12-03-23 @ 08:25) (62 - 90)  BP: 116/83 (12-03-23 @ 08:25) (116/83 - 134/88)  BP(mean): --  RR: 18 (12-03-23 @ 08:25) (18 - 20)  SpO2: 98% (12-02-23 @ 22:13) (97% - 98%)

## 2023-12-03 NOTE — BH INPATIENT PSYCHIATRY ASSESSMENT NOTE - RISK ASSESSMENT
risk factors: male, lives alone, not engaged in tx, +SI    protective factors: domiciled, denies access to guns risk factors: male, lives alone, not engaged in tx, +SI    protective factors: domiciled, denies access to guns, help seeking, future oriented, hx of medication response

## 2023-12-03 NOTE — BH INPATIENT PSYCHIATRY ASSESSMENT NOTE - NSBHMETABOLIC_PSY_ALL_CORE_FT
BMI: BMI (kg/m2): 38.9 (12-02-23 @ 23:15)  HbA1c:   Glucose:   BP: 116/83 (12-03-23 @ 08:25) (116/83 - 134/88)Vital Signs Last 24 Hrs  T(C): 35.7 (12-03-23 @ 08:25), Max: 35.9 (12-02-23 @ 16:09)  T(F): 96.3 (12-03-23 @ 08:25), Max: 96.7 (12-02-23 @ 22:13)  HR: 90 (12-03-23 @ 08:25) (62 - 90)  BP: 116/83 (12-03-23 @ 08:25) (116/83 - 134/88)  BP(mean): --  RR: 18 (12-03-23 @ 08:25) (18 - 20)  SpO2: 98% (12-02-23 @ 22:13) (97% - 98%)      Lipid Panel: Date/Time: 12-03-23 @ 08:35  Cholesterol, Serum: 167  LDL Cholesterol Calculated: 115  HDL Cholesterol, Serum: 36  Total Cholesterol/HDL Ration Measurement: --  Triglycerides, Serum: 79
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
## 2023-12-03 NOTE — CONSULT NOTE ADULT - SUBJECTIVE AND OBJECTIVE BOX
SUBJECTIVE:    Patient is a 58y old Male who presents with a chief complaint of   Currently admitted to medicine with the primary diagnosis of Suicidal ideation       Today is hospital day 1d. This morning he is resting comfortably in bed and reports no new issues or overnight events.     ROS:   CONSTITUTIONAL: No weakness, fevers or chills   EYES/ENT: No visual changes; No vertigo or throat pain   NECK: No pain or stiffness   RESPIRATORY: No cough, wheezing, hemoptysis; No shortness of breath   CARDIOVASCULAR: No chest pain or palpitations   GASTROINTESTINAL: No abdominal or epigastric pain. No nausea, vomiting, or hematemesis; No diarrhea or constipation. No melena or hematochezia.  GENITOURINARY: No dysuria, frequency or hematuria  NEUROLOGICAL: h/o bl le neuropathy   SKIN: No itching, rashes  MSK: h/o chronic low back pain       PAST MEDICAL & SURGICAL HISTORY  Chronic back pain    History of DVT (deep vein thrombosis)    Drug abuse    H/O right nephrectomy      SOCIAL HISTORY:    ALLERGIES:  No Known Allergies    MEDICATIONS:  STANDING MEDICATIONS  FLUoxetine 20 milliGRAM(s) Oral daily  influenza   Vaccine 0.5 milliLiter(s) IntraMuscular once    PRN MEDICATIONS  acetaminophen     Tablet .. 650 milliGRAM(s) Oral every 6 hours PRN  diphenhydrAMINE 25 milliGRAM(s) Oral at bedtime PRN  haloperidol     Tablet 5 milliGRAM(s) Oral every 8 hours PRN  LORazepam     Tablet 2 milliGRAM(s) Oral every 8 hours PRN  nicotine  Polacrilex Gum 2 milliGRAM(s) Oral every 2 hours PRN    VITALS:   T(F): 96.2  HR: 66  BP: 121/73  RR: 18  SpO2: 98%    LABS:  Negative for smoking/alcohol/drug use.                         16.6   7.84  )-----------( 191      ( 02 Dec 2023 16:55 )             49.7     12-02    140  |  105  |  10  ----------------------------<  95  4.5   |  26  |  1.4    Ca    9.1      02 Dec 2023 16:55    TPro  6.0  /  Alb  3.9  /  TBili  0.9  /  DBili  x   /  AST  12  /  ALT  12  /  AlkPhos  107  12-02      Urinalysis Basic - ( 02 Dec 2023 16:55 )    Color: Dark Yellow / Appearance: Clear / S.030 / pH: x  Gluc: 95 mg/dL / Ketone: Negative mg/dL  / Bili: Small / Urobili: 1.0 mg/dL   Blood: x / Protein: Trace mg/dL / Nitrite: Negative   Leuk Esterase: Negative / RBC: x / WBC x   Sq Epi: x / Non Sq Epi: x / Bacteria: x                RADIOLOGY:    PHYSICAL EXAM:  GEN: No acute distress  HEENT: normocephalic, atraumatic, aniceteric  LUNGS: bl breath sounds   HEART: S1/S2 present. RRR, no murmurs  ABD: Soft, non-tender, non-distended. Bowel sounds present  EXT: no edema   NEURO: AAOX3, normal affect      ASSESSMENT AND PLAN:    58-year-old male past medical history of anxiety, MDD, DVT on Xarelto (), hypertension, hyperlipidemia     # Mood disorder  - care per primary team  - denied si/hi on my exam  - check EKG for QTc if changing/ starting anti- psychotics     # H/O DVT on Xarelto   - per chart review in   - patient has been off AC per interview   - obtain LE duplex (venous)     # H/O HTN - dash diet, controlled    # H/O HLD - check lipid profile     # H/O B/L LE neuropathy - start gabapentin if no contraindications     # H/O Chronic back pain - pain control, XRAY Lumbo Sacral , Physical Therapy     # H/O Nicotine dependance , nicotine patch , smoking cessation counseling     thank you for the courtesy of this consult   recall prn

## 2023-12-03 NOTE — BH INPATIENT PSYCHIATRY ASSESSMENT NOTE - NSBHCHARTREVIEWINVESTIGATE_PSY_A_CORE FT
< from: 12 Lead ECG (12.02.23 @ 17:06) >    QTC Calculation(Bazett) 437 ms    P Axis 49 degrees    R Axis 50 degrees    T Axis 54 degrees    Diagnosis Line Normal sinus rhythm  Normal ECG    < end of copied text >

## 2023-12-03 NOTE — BH INPATIENT PSYCHIATRY ASSESSMENT NOTE - NSSUICPROTFACT_PSY_ALL_CORE
Supportive social network of family or friends Cultural, spiritual and/or moral attitudes against suicide/Ability to cope with stress

## 2023-12-03 NOTE — BH INPATIENT PSYCHIATRY ASSESSMENT NOTE - SELF INJURIOUS BEHAVIOR WITHOUT SUICIDAL INTENT:
Quality 431: Preventive Care And Screening: Unhealthy Alcohol Use - Screening: Patient not identified as an unhealthy alcohol user when screened for unhealthy alcohol use using a systematic screening method Detail Level: Detailed Quality 226: Preventive Care And Screening: Tobacco Use: Screening And Cessation Intervention: Patient screened for tobacco use and is an ex/non-smoker None known

## 2023-12-03 NOTE — BH INPATIENT PSYCHIATRY ASSESSMENT NOTE - CURRENT MEDICATION
MEDICATIONS  (STANDING):  FLUoxetine 20 milliGRAM(s) Oral daily  influenza   Vaccine 0.5 milliLiter(s) IntraMuscular once    MEDICATIONS  (PRN):  acetaminophen     Tablet .. 650 milliGRAM(s) Oral every 6 hours PRN Mild Pain (1 - 3), Moderate Pain (4 - 6)  diphenhydrAMINE 25 milliGRAM(s) Oral at bedtime PRN Insomnia  haloperidol     Tablet 5 milliGRAM(s) Oral every 8 hours PRN agitation  LORazepam     Tablet 2 milliGRAM(s) Oral every 8 hours PRN Agitation  nicotine  Polacrilex Gum 2 milliGRAM(s) Oral every 2 hours PRN withdrawals

## 2023-12-03 NOTE — BH INPATIENT PSYCHIATRY ASSESSMENT NOTE - HPI (INCLUDE ILLNESS QUALITY, SEVERITY, DURATION, TIMING, CONTEXT, MODIFYING FACTORS, ASSOCIATED SIGNS AND SYMPTOMS)
The patient is a 58-year-old male; ; domiciled alone; has 2 adult daughters; on disability/pension >10 years for injury while at work; self-reported PPHx of depression, denies prior ED visits or admissions, hx of SA; PMHx of renal cancer, back pain; BIB EMS; psychiatry consulted for SI.  Pt states that he fell about 1 year ago and broke some teeth, has not left the house since then because he is afraid to go out.  He states he is "tired of living," feels he has "nothing in life" and doesn't expect this to change.  He reports depressed mood, sleep disturbance, sometimes feels tired, anhedonia, eats 1 meal/day (delivered to the apt).  Pt states that he "wants it to end" and has thought of killing himself recently by taking all the pills in his house.  He states he came close to acting on it but did not, noting that when he woke up a few days later he no longer had that thought.  He reports "feeling lost" and "doesn't want to go on."  He states that he needs help because he "can't function on his own" and feels there is "no way he is going to survive."  He states he has not been tending to his apt or ADLs.  He believes one of his disability/pension payments must have stopped because some bills haven't been paid, has not seen a doctor in over 1.5 years, has not been taking any medications for medical/psych/pain reasons.  He gives consent for [employer] to speak with his ex-wife and brothers (though doesn't have their numbers at this time).  He does not want his daughter contacted as he does not want to upset her.      Covid Screen - Patient  denies positive test in past 3 months  denies recent exposures The patient is a 58-year-old male; ; domiciled alone; has 2 adult daughters; on disability/pension >10 years for injury while at work; self-reported PPHx of depression, denies prior ED visits or admissions, hx of SA; PMHx of renal cancer, back pain; BIB EMS; psychiatry consulted for SI.  Pt states that he fell about 1 year ago and broke some teeth, has not left the house since then because he is afraid to go out.  He states he is "tired of living," feels he has "nothing in life" and doesn't expect this to change.  He reports depressed mood, sleep disturbance, sometimes feels tired, anhedonia, eats 1 meal/day (delivered to the apt).  Pt states that he "wants it to end" and has thought of killing himself recently by taking all the pills in his house.  He states he came close to acting on it but did not, noting that when he woke up a few days later he no longer had that thought.  He reports "feeling lost" and "doesn't want to go on."  He states that he needs help because he "can't function on his own" and feels there is "no way he is going to survive."  He states he has not been tending to his apt or ADLs.  He believes one of his disability/pension payments must have stopped because some bills haven't been paid, has not seen a doctor in over 1.5 years, has not been taking any medications for medical/psych/pain reasons.  He gives consent for [employer] to speak with his ex-wife and brothers (though doesn't have their numbers at this time).  He does not want his daughter contacted as he does not want to upset her.      Covid Screen - Patient  denies positive test in past 3 months  denies recent exposures The patient is a 58-year-old male; ; domiciled alone; has 2 adult daughters; on disability/pension >10 years for injury while at work; self-reported PPHx of depression, denies prior ED visits or admissions, hx of SA; PMHx of renal cancer, back pain; BIB EMS; psychiatry consulted for SI.  Pt states that he fell about 1 year ago and broke some teeth, has not left the house since then because he is afraid to go out.  He states he is "tired of living," feels he has "nothing in life" and doesn't expect this to change.  He reports depressed mood, sleep disturbance, sometimes feels tired, anhedonia, eats 1 meal/day (delivered to the apt).  Pt states that he "wants it to end" and has thought of killing himself recently by taking all the pills in his house.  He states he came close to acting on it but did not, noting that when he woke up a few days later he no longer had that thought.  He reports "feeling lost" and "doesn't want to go on."  He states that he needs help because he "can't function on his own" and feels there is "no way he is going to survive."  He states he has not been tending to his apt or ADLs.  He believes one of his disability/pension payments must have stopped because some bills haven't been paid, has not seen a doctor in over 1.5 years, has not been taking any medications for medical/psych/pain reasons.  He gives consent for [employer] to speak with his ex-wife and brothers (though doesn't have their numbers at this time).  He does not want his daughter contacted as he does not want to upset her.      Covid Screen - Patient  denies positive test in past 3 months  denies recent exposures The patient is a 58-year-old male; ; domiciled alone; has 2 adult daughters; on disability/pension >10 years for injury while at work; self-reported PPHx of depression, denies prior ED visits or admissions, hx of SA; PMHx of renal cancer, back pain; BIB EMS; psychiatry consulted for SI.  Pt states that he fell about 1 year ago and broke some teeth, has not left the house since then because he is afraid to go out.  He states he is "tired of living," feels he has "nothing in life" and doesn't expect this to change.  He reports depressed mood, sleep disturbance, sometimes feels tired, anhedonia, eats 1 meal/day (delivered to the apt).  Pt states that he "wants it to end" and has thought of killing himself recently by taking all the pills in his house.  He states he came close to acting on it but did not, noting that when he woke up a few days later he no longer had that thought.  He reports "feeling lost" and "doesn't want to go on."  He states that he needs help because he "can't function on his own" and feels there is "no way he is going to survive."  He states he has not been tending to his apt or ADLs.  He believes one of his disability/pension payments must have stopped because some bills haven't been paid, has not seen a doctor in over 1.5 years, has not been taking any medications for medical/psych/pain reasons.  He gives consent for [employer] to speak with his ex-wife and brothers (though doesn't have their numbers at this time).  He does not want his daughter contacted as he does not want to upset her.      Covid Screen - Patient  denies positive test in past 3 months  denies recent exposures    Patient interviewed on unit:  Patient provides more but similar information as that provided in the ED. Today patient reports that he has a hx of depression for which he would previously follow with a psychiatrist. He reports that his provider relayed that the practice would close and the patient reports that thereafter the patient stopped following up and stopped taking medications. Patient reports that 1 yr ago he fell and knocked out some teeth which he reports lead him to stay indoors. The patient denied that he was self concious about his teeth situation and could not provide a reason as to why his loss of teeth lead him to be more insolated. He reports that as time passed he became more and more isolated and depressed and developed some thoughts about wanting to die with ideas of OD as he had one previous attempt. He reports that he was brought to the hospital as his ex-wife had sent a wellness check and he reports that he is hoping to get the help that he needs.  The patient is a 58-year-old male; ; domiciled alone; has 2 adult daughters; on disability/pension >10 years for injury while at work; self-reported PPHx of depression, denies prior ED visits or admissions, hx of SA; PMHx of renal cancer, back pain; BIB EMS; psychiatry consulted for SI.  Pt states that he fell about 1 year ago and broke some teeth, has not left the house since then because he is afraid to go out.  He states he is "tired of living," feels he has "nothing in life" and doesn't expect this to change.  He reports depressed mood, sleep disturbance, sometimes feels tired, anhedonia, eats 1 meal/day (delivered to the apt).  Pt states that he "wants it to end" and has thought of killing himself recently by taking all the pills in his house.  He states he came close to acting on it but did not, noting that when he woke up a few days later he no longer had that thought.  He reports "feeling lost" and "doesn't want to go on."  He states that he needs help because he "can't function on his own" and feels there is "no way he is going to survive."  He states he has not been tending to his apt or ADLs.  He believes one of his disability/pension payments must have stopped because some bills haven't been paid, has not seen a doctor in over 1.5 years, has not been taking any medications for medical/psych/pain reasons.  He gives consent for [employer] to speak with his ex-wife and brothers (though doesn't have their numbers at this time).  He does not want his daughter contacted as he does not want to upset her.      Covid Screen - Patient  denies positive test in past 3 months  denies recent exposures    Patient interviewed on unit:  Patient provides more but similar information as that provided in the ED. Today patient reports that he has a hx of depression for which he would previously follow with a psychiatrist. He reports that his provider relayed that the practice would close and the patient reports that thereafter the patient stopped following up and stopped taking medications. Patient reports that 1 yr ago he fell and knocked out some teeth which he reports lead him to stay indoors. The patient denied that he was self concious about his teeth situation and could not provide a reason as to why his loss of teeth lead him to be more insolated. He reports that as time passed he became more and more isolated and depressed and developed some thoughts about wanting to die with ideas of OD as he had one previous attempt. He reports that he was brought to the hospital as his ex-wife had sent a wellness check and he reports that he is hoping to get the help that he needs.  The patient is a 58-year-old male; ; domiciled alone; has 2 adult daughters; on disability/pension >10 years for injury while at work; self-reported PPHx of depression, denies prior ED visits or admissions, hx of SA; PMHx of renal cancer, back pain; BIB EMS; psychiatry consulted for SI.  Pt states that he fell about 1 year ago and broke some teeth, has not left the house since then because he is afraid to go out.  He states he is "tired of living," feels he has "nothing in life" and doesn't expect this to change.  He reports depressed mood, sleep disturbance, sometimes feels tired, anhedonia, eats 1 meal/day (delivered to the apt).  Pt states that he "wants it to end" and has thought of killing himself recently by taking all the pills in his house.  He states he came close to acting on it but did not, noting that when he woke up a few days later he no longer had that thought.  He reports "feeling lost" and "doesn't want to go on."  He states that he needs help because he "can't function on his own" and feels there is "no way he is going to survive."  He states he has not been tending to his apt or ADLs.  He believes one of his disability/pension payments must have stopped because some bills haven't been paid, has not seen a doctor in over 1.5 years, has not been taking any medications for medical/psych/pain reasons.  He gives consent for [employer] to speak with his ex-wife and brothers (though doesn't have their numbers at this time).  He does not want his daughter contacted as he does not want to upset her.      Covid Screen - Patient  denies positive test in past 3 months  denies recent exposures    Patient interviewed on unit:  Patient provides more but similar information as that provided in the ED. Today patient reports that he has a hx of depression for which he would previously follow with a psychiatrist. He reports that his provider relayed that the practice would close and the patient reports that thereafter the patient stopped following up and stopped taking medications. Patient reports that 1 yr ago he fell and knocked out some teeth which he reports lead him to stay indoors. The patient denied that he was self concious about his teeth situation and could not provide a reason as to why his loss of teeth lead him to be more insolated. He reports that as time passed he became more and more isolated and depressed and developed some thoughts about wanting to die with ideas of OD as he had one previous attempt. He reports that he was brought to the hospital as his ex-wife had sent a wellness check and he reports that he is hoping to get the help that he needs.

## 2023-12-04 DIAGNOSIS — F17.200 NICOTINE DEPENDENCE, UNSPECIFIED, UNCOMPLICATED: ICD-10-CM

## 2023-12-04 LAB
A1C WITH ESTIMATED AVERAGE GLUCOSE RESULT: 5.2 % — SIGNIFICANT CHANGE UP (ref 4–5.6)
A1C WITH ESTIMATED AVERAGE GLUCOSE RESULT: 5.2 % — SIGNIFICANT CHANGE UP (ref 4–5.6)
AMPHET UR-MCNC: NEGATIVE — SIGNIFICANT CHANGE UP
AMPHET UR-MCNC: NEGATIVE — SIGNIFICANT CHANGE UP
BARBITURATES UR SCN-MCNC: NEGATIVE — SIGNIFICANT CHANGE UP
BARBITURATES UR SCN-MCNC: NEGATIVE — SIGNIFICANT CHANGE UP
BENZODIAZ UR-MCNC: NEGATIVE — SIGNIFICANT CHANGE UP
BENZODIAZ UR-MCNC: NEGATIVE — SIGNIFICANT CHANGE UP
COCAINE METAB.OTHER UR-MCNC: NEGATIVE — SIGNIFICANT CHANGE UP
COCAINE METAB.OTHER UR-MCNC: NEGATIVE — SIGNIFICANT CHANGE UP
DRUG SCREEN 1, URINE RESULT: SIGNIFICANT CHANGE UP
DRUG SCREEN 1, URINE RESULT: SIGNIFICANT CHANGE UP
ESTIMATED AVERAGE GLUCOSE: 103 MG/DL — SIGNIFICANT CHANGE UP (ref 68–114)
ESTIMATED AVERAGE GLUCOSE: 103 MG/DL — SIGNIFICANT CHANGE UP (ref 68–114)
FENTANYL UR QL: NEGATIVE — SIGNIFICANT CHANGE UP
FENTANYL UR QL: NEGATIVE — SIGNIFICANT CHANGE UP
METHADONE UR-MCNC: NEGATIVE — SIGNIFICANT CHANGE UP
METHADONE UR-MCNC: NEGATIVE — SIGNIFICANT CHANGE UP
OPIATES UR-MCNC: NEGATIVE — SIGNIFICANT CHANGE UP
OPIATES UR-MCNC: NEGATIVE — SIGNIFICANT CHANGE UP
OXYCODONE UR-MCNC: NEGATIVE — SIGNIFICANT CHANGE UP
OXYCODONE UR-MCNC: NEGATIVE — SIGNIFICANT CHANGE UP
PCP UR-MCNC: NEGATIVE — SIGNIFICANT CHANGE UP
PCP UR-MCNC: NEGATIVE — SIGNIFICANT CHANGE UP
PROPOXYPHENE QUALITATIVE URINE RESULT: NEGATIVE — SIGNIFICANT CHANGE UP
PROPOXYPHENE QUALITATIVE URINE RESULT: NEGATIVE — SIGNIFICANT CHANGE UP
THC UR QL: NEGATIVE — SIGNIFICANT CHANGE UP
THC UR QL: NEGATIVE — SIGNIFICANT CHANGE UP
TSH SERPL-MCNC: 2.67 UIU/ML — SIGNIFICANT CHANGE UP (ref 0.27–4.2)
TSH SERPL-MCNC: 2.67 UIU/ML — SIGNIFICANT CHANGE UP (ref 0.27–4.2)

## 2023-12-04 PROCEDURE — 72110 X-RAY EXAM L-2 SPINE 4/>VWS: CPT | Mod: 26

## 2023-12-04 PROCEDURE — 93970 EXTREMITY STUDY: CPT | Mod: 26

## 2023-12-04 PROCEDURE — 99232 SBSQ HOSP IP/OBS MODERATE 35: CPT

## 2023-12-04 RX ORDER — DIPHENHYDRAMINE HCL 50 MG
50 CAPSULE ORAL ONCE
Refills: 0 | Status: COMPLETED | OUTPATIENT
Start: 2023-12-04 | End: 2023-12-05

## 2023-12-04 RX ORDER — PANTOPRAZOLE SODIUM 20 MG/1
40 TABLET, DELAYED RELEASE ORAL
Refills: 0 | Status: DISCONTINUED | OUTPATIENT
Start: 2023-12-05 | End: 2023-12-15

## 2023-12-04 RX ORDER — FAMOTIDINE 10 MG/ML
20 INJECTION INTRAVENOUS DAILY
Refills: 0 | Status: DISCONTINUED | OUTPATIENT
Start: 2023-12-04 | End: 2023-12-04

## 2023-12-04 RX ORDER — FAMOTIDINE 10 MG/ML
20 INJECTION INTRAVENOUS
Refills: 0 | Status: DISCONTINUED | OUTPATIENT
Start: 2023-12-04 | End: 2023-12-04

## 2023-12-04 RX ORDER — GABAPENTIN 400 MG/1
300 CAPSULE ORAL THREE TIMES A DAY
Refills: 0 | Status: DISCONTINUED | OUTPATIENT
Start: 2023-12-04 | End: 2023-12-06

## 2023-12-04 RX ADMIN — Medication 20 MILLIGRAM(S): at 08:36

## 2023-12-04 RX ADMIN — GABAPENTIN 300 MILLIGRAM(S): 400 CAPSULE ORAL at 20:12

## 2023-12-04 RX ADMIN — GABAPENTIN 300 MILLIGRAM(S): 400 CAPSULE ORAL at 13:07

## 2023-12-04 RX ADMIN — FAMOTIDINE 20 MILLIGRAM(S): 10 INJECTION INTRAVENOUS at 10:14

## 2023-12-04 NOTE — BH INPATIENT PSYCHIATRY PROGRESS NOTE - NSBHMETABOLIC_PSY_ALL_CORE_FT
BMI: BMI (kg/m2): 38.9 (12-02-23 @ 23:15)  HbA1c: A1C with Estimated Average Glucose Result: 5.2 % (12-03-23 @ 08:35)    Glucose:   BP: 123/83 (12-04-23 @ 09:24) (116/83 - 134/88)Vital Signs Last 24 Hrs  T(C): 36.2 (12-04-23 @ 09:24), Max: 36.2 (12-04-23 @ 09:24)  T(F): 97.1 (12-04-23 @ 09:24), Max: 97.1 (12-04-23 @ 09:24)  HR: 66 (12-04-23 @ 09:24) (66 - 66)  BP: 123/83 (12-04-23 @ 09:24) (121/73 - 123/83)  BP(mean): --  RR: 18 (12-04-23 @ 09:24) (18 - 18)  SpO2: --      Lipid Panel: Date/Time: 12-03-23 @ 08:35  Cholesterol, Serum: 167  LDL Cholesterol Calculated: 115  HDL Cholesterol, Serum: 36  Total Cholesterol/HDL Ration Measurement: --  Triglycerides, Serum: 79

## 2023-12-04 NOTE — BH SAFETY PLAN - ENVIRONMENT SAFETY 1:
One way I would make my environment safe would be to call for help once I get into one of these bad moods.

## 2023-12-04 NOTE — PHYSICAL THERAPY INITIAL EVALUATION ADULT - ADDITIONAL COMMENTS
as per pt he lives in a condo apartment W/ 2 Step to enter W/ RT hR And All in one level inside the apartment , as per pt he was independent W/ Functional activity PTA and use SC

## 2023-12-04 NOTE — BH SAFETY PLAN - ENVIRONMENT SAFETY 2:
Another way I would make my environment safe would be to take my medications as prescribed by the doctors.

## 2023-12-04 NOTE — BH SAFETY PLAN - WARNING SIGN 2
Another warning sign that a crisis is going to develop is that I feel hopeless and feel like I don't care about much.

## 2023-12-04 NOTE — BH INPATIENT PSYCHIATRY PROGRESS NOTE - NSBHASSESSSUMMFT_PSY_ALL_CORE
The patient is a 58-year-old male; ; domiciled alone; has 2 adult daughters; on disability/pension >10 years for injury while at work; self-reported PPHx of depression, denies prior ED visits or admissions, hx of SA; PMHx of renal cancer, back pain; BIB EMS; psychiatry consulted for SI.  Pt with worsening depression and decline in functioning over the past year, currently with SI and admitted for the same.     Patient seen and evaluated. As per nursing report no acute events. On approach patient calm and cooperative. No agitation or aggression noted. Patient states he has been depressed, anxious and isolative to his home for some time. States he was on Prozac in the past which seemed to help. Prozac re-started. Will continue to monitor and titrate accordingly. States he has not seen any doctors on been on any medication for some time. States he feels a little better today since admission and looking forward to a shower and shave. Complaints of acid reflux. Team discussed starting medication for acid reflux. Patient agreeable. Patient denies any A/V hallucinations. Denies any suicidal/homicidal ideations. Patient encouraged to get out of his room, engage with peers and attend groups.    Case discussed and patient seen with attending psychiatrist Dr. Banegas.    Per Hospitalist consult:  # H/O DVT on Xarelto   - per chart review in 2021  - patient has been off AC per interview   - obtain LE duplex (venous)     # H/O HTN - dash diet, controlled    # H/O HLD - check lipid profile     # H/O B/L LE neuropathy - start gabapentin if no contraindications     # H/O Chronic back pain - pain control, XRAY Lumbo Sacral , Physical Therapy     #Admit    #MDD  -Prozac 20mg daily    -Haldol 5mg Q8 PRN for agitation  -Lorazepam 2mg Q8 PRN for aggression    ##Tobacco use disorder  -Nicotine Gum PRN    -Pantoprazole  -Gabapentin  -Tylenol PRN

## 2023-12-04 NOTE — BH INPATIENT PSYCHIATRY PROGRESS NOTE - NSBHCHARTREVIEWVS_PSY_A_CORE FT
Vital Signs Last 24 Hrs  T(C): 36.2 (12-04-23 @ 09:24), Max: 36.2 (12-04-23 @ 09:24)  T(F): 97.1 (12-04-23 @ 09:24), Max: 97.1 (12-04-23 @ 09:24)  HR: 66 (12-04-23 @ 09:24) (66 - 66)  BP: 123/83 (12-04-23 @ 09:24) (121/73 - 123/83)  BP(mean): --  RR: 18 (12-04-23 @ 09:24) (18 - 18)  SpO2: --

## 2023-12-04 NOTE — PHYSICAL THERAPY INITIAL EVALUATION ADULT - NSACTIVITYREC_GEN_A_PT
Patient independent w/ Bed mobility , transfer and ambulation , use SC at baseline , recommend to use  RW for safety , Skill PT not recommended at this time , Reconsult Skill PT in future as appropriated. Am Pac Mobility Score :22

## 2023-12-04 NOTE — BH SAFETY PLAN - SUICIDE PREVENTION LIFELINE PHONES
Suicide Prevention Lifeline Phone: [phone] ([phone]) Suicide Prevention Lifeline Phone: [phone] TALK ([phone])

## 2023-12-04 NOTE — BH INPATIENT PSYCHIATRY PROGRESS NOTE - NSBHFUPINTERVALHXFT_PSY_A_CORE
Patient seen and evaluated. As per nursing report no acute events. On approach patient calm and cooperative. No agitation or aggression noted. Patient states he has been depressed, anxious and isolative to his home for some time. States he was on Prozac in the past which seemed to help. Prozac re-started. Will continue to monitor and titrate accordingly. States he has not seen any doctors on been on any medication for some time. States he feels a little better today since admission and looking forward to a shower and shave. Complaints of acid reflux. Team discussed starting medication for acid reflux. Patient agreeable. Patient denies any A/V hallucinations. Denies any suicidal/homicidal ideations. Patient encouraged to get out of his room, engage with peers and attend groups.    Case discussed and patient seen with attending psychiatrist Dr. Banegas.    Per Hospitalist consult:  # H/O DVT on Xarelto   - per chart review in 2021  - patient has been off AC per interview   - obtain LE duplex (venous)     # H/O HTN - dash diet, controlled    # H/O HLD - check lipid profile     # H/O B/L LE neuropathy - start gabapentin if no contraindications     # H/O Chronic back pain - pain control, XRAY Lumbo Sacral , Physical Therapy

## 2023-12-04 NOTE — BH SAFETY PLAN - WARNING SIGN 3
Also, another warning sign that a crisis is going to develop is that I started to have suicidal thoughts. I felt like I just didn't want to be alive anymore, I felt like I was waiting fro death.

## 2023-12-04 NOTE — PHYSICAL THERAPY INITIAL EVALUATION ADULT - PERTINENT HX OF CURRENT PROBLEM, REHAB EVAL
Patient is a 58y old Male who presents with a chief complaint of   Currently admitted to medicine with the primary diagnosis of Suicidal ideation

## 2023-12-04 NOTE — BH INPATIENT PSYCHIATRY PROGRESS NOTE - CURRENT MEDICATION
MEDICATIONS  (STANDING):  FLUoxetine 20 milliGRAM(s) Oral daily  gabapentin 300 milliGRAM(s) Oral three times a day  influenza   Vaccine 0.5 milliLiter(s) IntraMuscular once    MEDICATIONS  (PRN):  acetaminophen     Tablet .. 650 milliGRAM(s) Oral every 6 hours PRN Mild Pain (1 - 3), Moderate Pain (4 - 6)  haloperidol     Tablet 5 milliGRAM(s) Oral every 8 hours PRN agitation  LORazepam     Tablet 2 milliGRAM(s) Oral every 8 hours PRN Agitation  nicotine  Polacrilex Gum 2 milliGRAM(s) Oral every 2 hours PRN withdrawals

## 2023-12-04 NOTE — BH SAFETY PLAN - DISTRACTION PLACE 1
One place that is safe and will provide a distraction fro me would be downstairs from where I live in the recreation room.

## 2023-12-04 NOTE — UM REPORT PROGRESS NOTE - NSUMRMPROVIDER_GEN_A_CORE FT
Semaj French  Medicare  - [id number]   Pt does not need auth   Semaj French  Medicare  - [id number]   Pt does not need auth

## 2023-12-04 NOTE — PHYSICAL THERAPY INITIAL EVALUATION ADULT - GENERAL OBSERVATIONS, REHAB EVAL
13:35 -14:00 Chart reviewed. Order received.  Patient is ok to be  seen for PT,confirmed with RN. pt encountered  Sitting at Day room denies pain, and agrees to participate in session, ,NAD.

## 2023-12-05 LAB
APTT BLD: 39.4 SEC — HIGH (ref 27–39.2)
APTT BLD: 39.4 SEC — HIGH (ref 27–39.2)
INR BLD: 1.36 RATIO — HIGH (ref 0.65–1.3)
INR BLD: 1.36 RATIO — HIGH (ref 0.65–1.3)
PROTHROM AB SERPL-ACNC: 15.6 SEC — HIGH (ref 9.95–12.87)
PROTHROM AB SERPL-ACNC: 15.6 SEC — HIGH (ref 9.95–12.87)

## 2023-12-05 PROCEDURE — 99232 SBSQ HOSP IP/OBS MODERATE 35: CPT

## 2023-12-05 PROCEDURE — 99222 1ST HOSP IP/OBS MODERATE 55: CPT

## 2023-12-05 RX ORDER — TRAZODONE HCL 50 MG
100 TABLET ORAL AT BEDTIME
Refills: 0 | Status: DISCONTINUED | OUTPATIENT
Start: 2023-12-05 | End: 2023-12-15

## 2023-12-05 RX ORDER — RIVAROXABAN 15 MG-20MG
15 KIT ORAL
Refills: 0 | Status: DISCONTINUED | OUTPATIENT
Start: 2023-12-05 | End: 2023-12-05

## 2023-12-05 RX ORDER — RIVAROXABAN 15 MG-20MG
15 KIT ORAL
Refills: 0 | Status: DISCONTINUED | OUTPATIENT
Start: 2023-12-05 | End: 2023-12-15

## 2023-12-05 RX ORDER — RIVAROXABAN 15 MG-20MG
20 KIT ORAL
Refills: 0 | Status: CANCELLED | OUTPATIENT
Start: 2023-12-26 | End: 2023-12-15

## 2023-12-05 RX ORDER — FLUOXETINE HCL 10 MG
40 CAPSULE ORAL DAILY
Refills: 0 | Status: DISCONTINUED | OUTPATIENT
Start: 2023-12-06 | End: 2023-12-15

## 2023-12-05 RX ADMIN — RIVAROXABAN 15 MILLIGRAM(S): KIT at 00:27

## 2023-12-05 RX ADMIN — Medication 100 MILLIGRAM(S): at 22:52

## 2023-12-05 RX ADMIN — Medication 50 MILLIGRAM(S): at 00:30

## 2023-12-05 RX ADMIN — RIVAROXABAN 15 MILLIGRAM(S): KIT at 08:21

## 2023-12-05 RX ADMIN — RIVAROXABAN 15 MILLIGRAM(S): KIT at 16:32

## 2023-12-05 RX ADMIN — Medication 20 MILLIGRAM(S): at 08:21

## 2023-12-05 RX ADMIN — PANTOPRAZOLE SODIUM 40 MILLIGRAM(S): 20 TABLET, DELAYED RELEASE ORAL at 08:21

## 2023-12-05 RX ADMIN — GABAPENTIN 300 MILLIGRAM(S): 400 CAPSULE ORAL at 13:09

## 2023-12-05 RX ADMIN — GABAPENTIN 300 MILLIGRAM(S): 400 CAPSULE ORAL at 20:25

## 2023-12-05 RX ADMIN — GABAPENTIN 300 MILLIGRAM(S): 400 CAPSULE ORAL at 08:21

## 2023-12-05 NOTE — CONSULT NOTE ADULT - ASSESSMENT
ASSESSMENT AND PLAN:    58-year-old male admitted for past medical history of MDD with exasperation of sx of anxiety/ depression with SI.  [facility] consult called for acute LLE DVT with known hx of DVT on Xarelto (2020),hx moderate stroke 2021 , and hx of right nephrectomy of Renal ca       # Acute LLE DVT with H/O DVT on Xarelto   - B/L LE venous duplex doppler positive thrombus of the left popliteal vein   - per chart review in 2021patient was started on XARELTO but has patient has been off AC - cannot recall last dose; possibly after last refill when initially prescribed   - Recommend to continue XARELTO; due to poor history of compliance with XARELTO at this time it is unclear if acute dvt is due to AC failure or underlying coagulation disorder   - Brief Pharmacokinetic of XARELTO reviewed and compliance once discharged stressed to patient   - f/u as outpt at [facility] with Dr. Benítez for AC management and assessment     # R partial nephrectomy questionable Renal Ca   - F/U with Urology as outpatient     # H/O B/L LE neuropathy  - cont gabapentin  Neurology f/u as per primary team assessment     # Mood disorder  - care per primary team  - denies si/hi at time of exam    Above discussed with Dr. Benítez    ASSESSMENT AND PLAN:    58-year-old male admitted for past medical history of MDD with exasperation of sx of anxiety/ depression with SI.  [facility] consult called for acute LLE DVT with known hx of DVT on Xarelto (2020),hx moderate stroke 2021 , and hx of right nephrectomy of Renal ca       # Acute LLE DVT with H/O DVT on Xarelto   - B/L LE venous duplex doppler positive thrombus of the left popliteal vein   - per chart review in 2021patient was started on XARELTO but has patient has been off AC - cannot recall last dose; possibly after last refill when initially prescribed   - Recommend to continue XARELTO; due to poor history of compliance with XARELTO at this time it is unclear if acute dvt is due to AC failure or underlying coagulation disorder   - Brief Pharmacokinetic of XARELTO reviewed and compliance once discharged stressed to patient   - f/u as outpt at [facility] with Dr. Benítez for AC management and assessment     # R partial nephrectomy questionable Renal Ca   - F/U with Urology as outpatient     # H/O B/L LE neuropathy  - cont gabapentin  Neurology f/u as per primary team assessment     # Mood disorder  - care per primary team  - denies si/hi at time of exam    Above discussed with Dr. Benítez

## 2023-12-05 NOTE — BH INPATIENT PSYCHIATRY PROGRESS NOTE - CURRENT MEDICATION
MEDICATIONS  (STANDING):  FLUoxetine 20 milliGRAM(s) Oral daily  gabapentin 300 milliGRAM(s) Oral three times a day  influenza   Vaccine 0.5 milliLiter(s) IntraMuscular once  pantoprazole    Tablet 40 milliGRAM(s) Oral before breakfast  rivaroxaban 15 milliGRAM(s) Oral two times a day with meals    MEDICATIONS  (PRN):  acetaminophen     Tablet .. 650 milliGRAM(s) Oral every 6 hours PRN Mild Pain (1 - 3), Moderate Pain (4 - 6)  haloperidol     Tablet 5 milliGRAM(s) Oral every 8 hours PRN agitation  LORazepam     Tablet 2 milliGRAM(s) Oral every 8 hours PRN Aggression  nicotine  Polacrilex Gum 2 milliGRAM(s) Oral every 2 hours PRN withdrawals   MEDICATIONS  (STANDING):  FLUoxetine 40 milliGRAM(s) Oral daily  gabapentin 300 milliGRAM(s) Oral three times a day  influenza   Vaccine 0.5 milliLiter(s) IntraMuscular once  pantoprazole    Tablet 40 milliGRAM(s) Oral before breakfast  rivaroxaban 15 milliGRAM(s) Oral two times a day with meals  traZODone 100 milliGRAM(s) Oral at bedtime    MEDICATIONS  (PRN):  acetaminophen     Tablet .. 650 milliGRAM(s) Oral every 6 hours PRN Mild Pain (1 - 3), Moderate Pain (4 - 6)  haloperidol     Tablet 5 milliGRAM(s) Oral every 8 hours PRN agitation  LORazepam     Tablet 2 milliGRAM(s) Oral every 8 hours PRN Aggression  nicotine  Polacrilex Gum 2 milliGRAM(s) Oral every 2 hours PRN withdrawals   MEDICATIONS  (STANDING):  FLUoxetine 40 milliGRAM(s) Oral daily  gabapentin 800 milliGRAM(s) Oral two times a day  influenza   Vaccine 0.5 milliLiter(s) IntraMuscular once  pantoprazole    Tablet 40 milliGRAM(s) Oral before breakfast  rivaroxaban 15 milliGRAM(s) Oral two times a day with meals  traZODone 100 milliGRAM(s) Oral at bedtime    MEDICATIONS  (PRN):  acetaminophen     Tablet .. 650 milliGRAM(s) Oral every 6 hours PRN Mild Pain (1 - 3), Moderate Pain (4 - 6)  haloperidol     Tablet 5 milliGRAM(s) Oral every 8 hours PRN agitation  LORazepam     Tablet 2 milliGRAM(s) Oral every 8 hours PRN Aggression  nicotine  Polacrilex Gum 2 milliGRAM(s) Oral every 2 hours PRN withdrawals
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
## 2023-12-05 NOTE — BH INPATIENT PSYCHIATRY PROGRESS NOTE - NSBHASSESSSUMMFT_PSY_ALL_CORE
The patient is a 58-year-old male; ; domiciled alone; has 2 adult daughters; on disability/pension >10 years for injury while at work; self-reported PPHx of depression, denies prior ED visits or admissions, hx of SA; PMHx of renal cancer, back pain; BIB EMS; psychiatry consulted for SI.  Pt with worsening depression and decline in functioning over the past year, currently with SI and admitted for the same.     Patient seen and evaluated. As per nursing report results of Venous duplex pos DVT. Patient started on Xarelto. On approach patient irritable with multiple complaints. Verbalizes not receiving one on one therapy. Verbalizing not being able to sit with doctors for a long period of time to discuss his medical issues. Then patient stated he cant go back home because his house needs to be "cleaned out" Patient appears future oriented. Then writer stopped patient and asked about his mental state at this time. Patient states he is still depressed with some anxiety. Writer discussed increasing his Prozac. Patient was on a much higher dose in the past. Patient in agreement. Patient denies any suicidal/homicidal ideations. Denies any A/V hallucinations. Patient encouraged patient to get out of his room and attend groups.     Hematology consult ordered    #Admit    #MDD  -Prozac 20mg daily--> 40mg daily 12/6/23    -Haldol 5mg Q8 PRN for agitation  -Lorazepam 2mg Q8 PRN for aggression    ##Tobacco use disorder  -Nicotine Gum PRN    -Pantoprazole  -Gabapentin  -Tylenol PRN The patient is a 58-year-old male; ; domiciled alone; has 2 adult daughters; on disability/pension >10 years for injury while at work; self-reported PPHx of depression, denies prior ED visits or admissions, hx of SA; PMHx of renal cancer, back pain; BIB EMS; psychiatry consulted for SI.  Pt with worsening depression and decline in functioning over the past year, currently with SI and admitted for the same.     Patient seen and evaluated. As per nursing report results of Venous duplex pos DVT. Patient started on Xarelto. On approach patient irritable with multiple complaints. Verbalizes not receiving one on one therapy. Verbalizing not being able to sit with doctors for a long period of time to discuss his medical issues. Then patient stated he cant go back home because his house needs to be "cleaned out" Patient appears future oriented. Then writer stopped patient and asked about his mental state at this time. Patient states he is still depressed with some anxiety. Writer discussed increasing his Prozac. Patient was on a much higher dose in the past. Patient in agreement. Patient denies any suicidal/homicidal ideations. Denies any A/V hallucinations. Patient encouraged patient to get out of his room and attend groups.     PT recommended a walker due to bilateral neuropathy. Patient ambulates without a walker.    Hematology consult ordered    #Admit    #MDD  -Prozac 20mg daily--> 40mg daily 12/6/23    -Haldol 5mg Q8 PRN for agitation  -Lorazepam 2mg Q8 PRN for aggression    ##Tobacco use disorder  -Nicotine Gum PRN    -Pantoprazole  -Gabapentin  -Tylenol PRN

## 2023-12-05 NOTE — BH INPATIENT PSYCHIATRY PROGRESS NOTE - NSBHCHARTREVIEWVS_PSY_A_CORE FT
Vital Signs Last 24 Hrs  T(C): 37.1 (12-05-23 @ 08:33), Max: 37.1 (12-05-23 @ 08:33)  T(F): 98.8 (12-05-23 @ 08:33), Max: 98.8 (12-05-23 @ 08:33)  HR: 85 (12-05-23 @ 08:33) (69 - 85)  BP: 102/77 (12-05-23 @ 08:33) (102/77 - 121/72)  BP(mean): --  RR: 16 (12-05-23 @ 08:33) (16 - 18)  SpO2: --     Vital Signs Last 24 Hrs  T(C): 35.4 (12-06-23 @ 07:35), Max: 35.8 (12-05-23 @ 14:32)  T(F): 95.8 (12-06-23 @ 07:35), Max: 96.5 (12-05-23 @ 14:32)  HR: 61 (12-06-23 @ 07:35) (61 - 62)  BP: 94/72 (12-06-23 @ 07:35) (94/72 - 117/78)  BP(mean): --  RR: 18 (12-06-23 @ 07:35) (16 - 18)  SpO2: --     Vital Signs Last 24 Hrs  T(C): 35.9 (12-06-23 @ 15:00), Max: 35.9 (12-06-23 @ 15:00)  T(F): 96.6 (12-06-23 @ 15:00), Max: 96.6 (12-06-23 @ 15:00)  HR: 79 (12-06-23 @ 15:00) (61 - 79)  BP: 81/67 (12-06-23 @ 15:00) (81/67 - 94/72)  BP(mean): --  RR: 16 (12-06-23 @ 15:00) (16 - 18)  SpO2: --

## 2023-12-05 NOTE — BH INPATIENT PSYCHIATRY PROGRESS NOTE - NSBHMETABOLIC_PSY_ALL_CORE_FT
BMI: BMI (kg/m2): 38.9 (12-02-23 @ 23:15)  HbA1c: A1C with Estimated Average Glucose Result: 5.2 % (12-03-23 @ 08:35)    Glucose:   BP: 102/77 (12-05-23 @ 08:33) (102/77 - 134/88)Vital Signs Last 24 Hrs  T(C): 37.1 (12-05-23 @ 08:33), Max: 37.1 (12-05-23 @ 08:33)  T(F): 98.8 (12-05-23 @ 08:33), Max: 98.8 (12-05-23 @ 08:33)  HR: 85 (12-05-23 @ 08:33) (69 - 85)  BP: 102/77 (12-05-23 @ 08:33) (102/77 - 121/72)  BP(mean): --  RR: 16 (12-05-23 @ 08:33) (16 - 18)  SpO2: --      Lipid Panel: Date/Time: 12-03-23 @ 08:35  Cholesterol, Serum: 167  LDL Cholesterol Calculated: 115  HDL Cholesterol, Serum: 36  Total Cholesterol/HDL Ration Measurement: --  Triglycerides, Serum: 79   BMI: BMI (kg/m2): 38.9 (12-05-23 @ 11:48)  HbA1c: A1C with Estimated Average Glucose Result: 5.2 % (12-03-23 @ 08:35)    Glucose:   BP: 94/72 (12-06-23 @ 07:35) (94/72 - 123/83)Vital Signs Last 24 Hrs  T(C): 35.4 (12-06-23 @ 07:35), Max: 35.8 (12-05-23 @ 14:32)  T(F): 95.8 (12-06-23 @ 07:35), Max: 96.5 (12-05-23 @ 14:32)  HR: 61 (12-06-23 @ 07:35) (61 - 62)  BP: 94/72 (12-06-23 @ 07:35) (94/72 - 117/78)  BP(mean): --  RR: 18 (12-06-23 @ 07:35) (16 - 18)  SpO2: --      Lipid Panel: Date/Time: 12-03-23 @ 08:35  Cholesterol, Serum: 167  LDL Cholesterol Calculated: 115  HDL Cholesterol, Serum: 36  Total Cholesterol/HDL Ration Measurement: --  Triglycerides, Serum: 79   BMI: BMI (kg/m2): 38.9 (12-05-23 @ 11:48)  HbA1c: A1C with Estimated Average Glucose Result: 5.2 % (12-03-23 @ 08:35)    Glucose:   BP: 81/67 (12-06-23 @ 15:00) (81/67 - 123/83)Vital Signs Last 24 Hrs  T(C): 35.9 (12-06-23 @ 15:00), Max: 35.9 (12-06-23 @ 15:00)  T(F): 96.6 (12-06-23 @ 15:00), Max: 96.6 (12-06-23 @ 15:00)  HR: 79 (12-06-23 @ 15:00) (61 - 79)  BP: 81/67 (12-06-23 @ 15:00) (81/67 - 94/72)  BP(mean): --  RR: 16 (12-06-23 @ 15:00) (16 - 18)  SpO2: --      Lipid Panel: Date/Time: 12-03-23 @ 08:35  Cholesterol, Serum: 167  LDL Cholesterol Calculated: 115  HDL Cholesterol, Serum: 36  Total Cholesterol/HDL Ration Measurement: --  Triglycerides, Serum: 79

## 2023-12-05 NOTE — CONSULT NOTE ADULT - SKIN
multiple tattoos noted and right hand middle index macular plaque as per patient childhood birth freya/warm and dry/color normal/normal/no rashes/no ulcers

## 2023-12-05 NOTE — CONSULT NOTE ADULT - MUSCULOSKELETAL
right calf tenderness, negative left ira sign/ROM intact/calf tenderness B/L lower extremity edema 1+ right calf tenderness, negative left ira sign/ROM intact/calf tenderness

## 2023-12-05 NOTE — CHART NOTE - NSCHARTNOTEFT_GEN_A_CORE
Called by RN informing me that preliminary read on venous duplex of leg indicates acute DVT. Spoke to patient at bedside, tells me that he isn't taking Xarelto any more because he wasn't told to continue this medication. At this time will start Xarelto (15 mg BID for 21 days then switch to 20 mg daily) pending official reading of this radiographic study. PT/PTT ordered for am Called by RN informing me that preliminary read on venous duplex of leg indicates acute DVT. Spoke to patient at bedside, tells me that he isn't taking Xarelto any more because he wasn't told to continue this medication. At this time will start Xarelto (15 mg BID for 21 days then switch to 20 mg daily) pending official reading of this radiographic study. PT/PTT ordered for am. Would consider reviewing case with hematology since this is now recurrence (or investigate prior work up if any) Called by RN informing me that preliminary read on venous duplex of leg indicates acute DVT. Spoke to patient at bedside, tells me that he isn't taking Xarelto any more because he wasn't told to continue this medication. At this time will start Xarelto (15 mg BID for 21 days then switch to 20 mg daily) pending official reading of this radiographic study. PT/PTT ordered for am. Would consider reviewing case with hematology since this is now recurrence (or investigate prior work up if any, apparently has history of kidney cancer)

## 2023-12-05 NOTE — BH INPATIENT PSYCHIATRY PROGRESS NOTE - NSBHFUPINTERVALHXFT_PSY_A_CORE
Patient seen and evaluated. As per nursing report results of Venous duplex pos DVT. Patient started on Xarelto. On approach patient irritable with multiple complaints. Verbalizes not receiving one on one therapy. Verbalizing not being able to sit with doctors for a long period of time to discuss his medical issues. Then patient stated he cant go back home because his house needs to be "cleaned out" Patient appears future oriented. Then writer stopped patient and asked about his mental state at this time. Patient states he is still depressed with some anxiety. Writer discussed increasing his Prozac. Patient was on a much higher dose in the past. Patient in agreement. Patient denies any suicidal/homicidal ideations. Denies any A/V hallucinations. Patient encouraged patient to get out of his room and attend groups.     Hematology consult ordered       Patient seen and evaluated. As per nursing report results of Venous duplex pos DVT. Patient started on Xarelto. On approach patient irritable with multiple complaints. Verbalizes not receiving one on one therapy. Verbalizing not being able to sit with doctors for a long period of time to discuss his medical issues. Then patient stated he cant go back home because his house needs to be "cleaned out" Patient appears future oriented. Then writer stopped patient and asked about his mental state at this time. Patient states he is still depressed with some anxiety. Writer discussed increasing his Prozac. Patient was on a much higher dose in the past. Patient in agreement. Patient denies any suicidal/homicidal ideations. Denies any A/V hallucinations. Patient encouraged patient to get out of his room and attend groups.     PT recommended a walker due to bilateral neuropathy. Patient ambulates without a walker.    Hematology consult ordered

## 2023-12-06 PROCEDURE — 99222 1ST HOSP IP/OBS MODERATE 55: CPT

## 2023-12-06 PROCEDURE — 99221 1ST HOSP IP/OBS SF/LOW 40: CPT | Mod: GC

## 2023-12-06 PROCEDURE — 99232 SBSQ HOSP IP/OBS MODERATE 35: CPT

## 2023-12-06 RX ORDER — GABAPENTIN 400 MG/1
800 CAPSULE ORAL
Refills: 0 | Status: DISCONTINUED | OUTPATIENT
Start: 2023-12-06 | End: 2023-12-15

## 2023-12-06 RX ADMIN — Medication 40 MILLIGRAM(S): at 08:10

## 2023-12-06 RX ADMIN — RIVAROXABAN 15 MILLIGRAM(S): KIT at 08:10

## 2023-12-06 RX ADMIN — Medication 100 MILLIGRAM(S): at 21:24

## 2023-12-06 RX ADMIN — PANTOPRAZOLE SODIUM 40 MILLIGRAM(S): 20 TABLET, DELAYED RELEASE ORAL at 06:33

## 2023-12-06 RX ADMIN — GABAPENTIN 300 MILLIGRAM(S): 400 CAPSULE ORAL at 08:10

## 2023-12-06 RX ADMIN — RIVAROXABAN 15 MILLIGRAM(S): KIT at 17:16

## 2023-12-06 RX ADMIN — GABAPENTIN 300 MILLIGRAM(S): 400 CAPSULE ORAL at 12:17

## 2023-12-06 RX ADMIN — Medication 650 MILLIGRAM(S): at 21:25

## 2023-12-06 RX ADMIN — GABAPENTIN 800 MILLIGRAM(S): 400 CAPSULE ORAL at 21:24

## 2023-12-06 NOTE — BH INPATIENT PSYCHIATRY PROGRESS NOTE - NSBHASSESSSUMMFT_PSY_ALL_CORE
The patient is a 58-year-old male; ; domiciled alone; has 2 adult daughters; on disability/pension >10 years for injury while at work; self-reported PPHx of depression, denies prior ED visits or admissions, hx of SA; PMHx of renal cancer, back pain; BIB EMS; psychiatry consulted for SI.  Pt with worsening depression and decline in functioning over the past year, currently with SI and admitted for the same.     Patient seen and evaluated. As per nursing report patient had difficulty sleeping. On call ordered Trazadone. On approach patient calm and cooperative. No agitation f aggression noted. Complaints of insomnia last night. Patient states his mood is the same. Stared increased dose of Prozac today will continue to monitor. Discussed discharge option.  spoke to patient yesterday afternoon and provided patient information and PHP. Patient declined and is focused on getting his place “cleaned out. Also focused on receiving medical treatment for his medical issues that he has not addressed in over a year. Patient denies any suicidal/homicidal ideations. Denies any A/V hallucinations. Writer encouraged patient to attend groups. Patient initially declined group. Stated he was tired. Writer expressed that patient needs to work on his mental health while here and comply with treatment or he needs to be discharged. Patient then reluctantly agreed to go to group. Patient visible on the unit engaging with peers and reluctantly attending groups.    Per Hematology:  # Acute LLE DVT with H/O DVT on Xarelto   - B/L LE venous duplex doppler positive thrombus of the left popliteal vein   - per chart review in 2021patient was started on XARELTO but has patient has been off AC - cannot recall last dose; possibly after last refill when initially prescribed   - Recommend to continue XARELTO; due to poor history of compliance with XARELTO at this time it is unclear if acute dvt is due to AC failure or underlying coagulation disorder   - Brief Pharmacokinetic of XARELTO reviewed and compliance once discharged stressed to patient   - f/u as outpt at [facility] with Dr. 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 Treviño    #Admit    #MDD  -Prozac 20mg daily--> 40mg daily 12/6/23  -Trazodone 100mg for insomnia    -Haldol 5mg Q8 PRN for agitation  -Lorazepam 2mg Q8 PRN for aggression    ##Tobacco use disorder  -Nicotine Gum PRN    -Pantoprazole  -Gabapentin  -Tylenol PRN The patient is a 58-year-old male; ; domiciled alone; has 2 adult daughters; on disability/pension >10 years for injury while at work; self-reported PPHx of depression, denies prior ED visits or admissions, hx of SA; PMHx of renal cancer, back pain; BIB EMS; psychiatry consulted for SI.  Pt with worsening depression and decline in functioning over the past year, currently with SI and admitted for the same.     Patient seen and evaluated. As per nursing report patient had difficulty sleeping. On call ordered Trazadone. On approach patient calm and cooperative. No agitation f aggression noted. Complaints of insomnia last night. Patient states his mood is the same. Stared increased dose of Prozac today will continue to monitor. Discussed discharge option.  spoke to patient yesterday afternoon and provided patient information and PHP. Patient declined and is focused on getting his place “cleaned out. Also focused on receiving medical treatment for his medical issues that he has not addressed in over a year. Patient denies any suicidal/homicidal ideations. Denies any A/V hallucinations. Writer encouraged patient to attend groups. Patient initially declined group. Stated he was tired. Writer expressed that patient needs to work on his mental health while here and comply with treatment or he needs to be discharged. Patient then reluctantly agreed to go to group. Patient visible on the unit engaging with peers and reluctantly attending groups.    Per Hematology:  # Acute LLE DVT with H/O DVT on Xarelto   - B/L LE venous duplex doppler positive thrombus of the left popliteal vein   - per chart review in 2021patient was started on XARELTO but has patient has been off AC - cannot recall last dose; possibly after last refill when initially prescribed   - Recommend to continue XARELTO; due to poor history of compliance with XARELTO at this time it is unclear if acute dvt is due to AC failure or underlying coagulation disorder   - Brief Pharmacokinetic of XARELTO reviewed and compliance once discharged stressed to patient   - f/u as outpt at [facility] with Dr. 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 Treviño    #Admit    #MDD  -Prozac 20mg daily--> 40mg daily 12/6/23  -Trazodone 100mg for insomnia    -Haldol 5mg Q8 PRN for agitation  -Lorazepam 2mg Q8 PRN for aggression    ##Tobacco use disorder  -Nicotine Gum PRN    -Pantoprazole  -Gabapentin  -Tylenol PRN The patient is a 58-year-old male; ; domiciled alone; has 2 adult daughters; on disability/pension >10 years for injury while at work; self-reported PPHx of depression, denies prior ED visits or admissions, hx of SA; PMHx of renal cancer, back pain; BIB EMS; psychiatry consulted for SI.  Pt with worsening depression and decline in functioning over the past year, currently with SI and admitted for the same.     Patient seen and evaluated. As per nursing report patient had difficulty sleeping. On call ordered Trazadone. On approach patient calm and cooperative. No agitation f aggression noted. Complaints of insomnia last night. Patient states his mood is the same. Stared increased dose of Prozac today will continue to monitor. Discussed discharge option.  spoke to patient yesterday afternoon and provided patient information and PHP. Patient declined and is focused on getting his place “cleaned out. Also focused on receiving medical treatment for his medical issues that he has not addressed in over a year. Patient denies any suicidal/homicidal ideations. Denies any A/V hallucinations. Writer encouraged patient to attend groups. Patient initially declined group. Stated he was tired. Writer expressed that patient needs to work on his mental health while here and comply with treatment or he needs to be discharged. Patient then reluctantly agreed to go to group. Patient visible on the unit engaging with peers and reluctantly attending groups.    PT recommended a walker due to bilateral neuropathy. Patient ambulates without a walker.    Per Hematology:  # Acute LLE DVT with H/O DVT on Xarelto   - B/L LE venous duplex doppler positive thrombus of the left popliteal vein   - per chart review in 2021patient was started on XARELTO but has patient has been off AC - cannot recall last dose; possibly after last refill when initially prescribed   - Recommend to continue XARELTO; due to poor history of compliance with XARELTO at this time it is unclear if acute dvt is due to AC failure or underlying coagulation disorder   - Brief Pharmacokinetic of XARELTO reviewed and compliance once discharged stressed to patient   - f/u as outpt at [facility] with Dr. 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 Treviño    #Admit    #MDD  -Prozac 20mg daily--> 40mg daily 12/6/23  -Trazodone 100mg for insomnia    -Haldol 5mg Q8 PRN for agitation  -Lorazepam 2mg Q8 PRN for aggression    ##Tobacco use disorder  -Nicotine Gum PRN    -Pantoprazole  -Gabapentin  -Tylenol PRN The patient is a 58-year-old male; ; domiciled alone; has 2 adult daughters; on disability/pension >10 years for injury while at work; self-reported PPHx of depression, denies prior ED visits or admissions, hx of SA; PMHx of renal cancer, back pain; BIB EMS; psychiatry consulted for SI.  Pt with worsening depression and decline in functioning over the past year, currently with SI and admitted for the same.     Patient seen and evaluated. As per nursing report patient had difficulty sleeping. On call ordered Trazadone. On approach patient calm and cooperative. No agitation f aggression noted. Complaints of insomnia last night. Patient states his mood is the same. Stared increased dose of Prozac today will continue to monitor. Discussed discharge option.  spoke to patient yesterday afternoon and provided patient information and PHP. Patient declined and is focused on getting his place “cleaned out. Also focused on receiving medical treatment for his medical issues that he has not addressed in over a year. Patient denies any suicidal/homicidal ideations. Denies any A/V hallucinations. Writer encouraged patient to attend groups. Patient initially declined group. Stated he was tired. Writer expressed that patient needs to work on his mental health while here and comply with treatment or he needs to be discharged. Patient then reluctantly agreed to go to group. Patient visible on the unit engaging with peers and reluctantly attending groups.    PT recommended a walker due to bilateral neuropathy. Patient ambulates without a walker.    Per Hematology:  # Acute LLE DVT with H/O DVT on Xarelto   - B/L LE venous duplex doppler positive thrombus of the left popliteal vein   - per chart review in 2021patient was started on XARELTO but has patient has been off AC - cannot recall last dose; possibly after last refill when initially prescribed   - Recommend to continue XARELTO; due to poor history of compliance with XARELTO at this time it is unclear if acute dvt is due to AC failure or underlying coagulation disorder   - Brief Pharmacokinetic of XARELTO reviewed and compliance once discharged stressed to patient   - f/u as outpt at [facility] with Dr. 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 Treviño    #Admit    #MDD  -Prozac 20mg daily--> 40mg daily 12/6/23  -Trazodone 100mg for insomnia    -Haldol 5mg Q8 PRN for agitation  -Lorazepam 2mg Q8 PRN for aggression    ##Tobacco use disorder  -Nicotine Gum PRN    -Pantoprazole  -Gabapentin  -Tylenol PRN

## 2023-12-06 NOTE — BH INPATIENT PSYCHIATRY PROGRESS NOTE - NSBHFUPINTERVALHXFT_PSY_A_CORE
Patient seen and evaluated. As per nursing report patient had difficulty sleeping. On call ordered Trazadone. On approach patient calm and cooperative. No agitation f aggression noted. Complaints of insomnia last night. Patient states his mood is the same. Stared increased dose of Prozac today will continue to monitor. Discussed discharge option.  spoke to patient yesterday afternoon and provided patient information and PHP. Patient declined and is focused on getting his place “cleaned out. Also focused on receiving medical treatment for his medical issues that he has not addressed in over a year. Patient denies any suicidal/homicidal ideations. Denies any A/V hallucinations. Writer encouraged patient to attend groups. Patient initially declined group. Stated he was tired. Writer expressed that patient needs to work on his mental health while here and comply with treatment or he needs to be discharged. Patient then reluctantly agreed to go to group. Patient visible on the unit engaging with peers and reluctantly attending groups.    Per Hematology:  # Acute LLE DVT with H/O DVT on Xarelto   - B/L LE venous duplex doppler positive thrombus of the left popliteal vein   - per chart review in 2021patient was started on XARELTO but has patient has been off AC - cannot recall last dose; possibly after last refill when initially prescribed   - Recommend to continue XARELTO; due to poor history of compliance with XARELTO at this time it is unclear if acute dvt is due to AC failure or underlying coagulation disorder   - Brief Pharmacokinetic of XARELTO reviewed and compliance once discharged stressed to patient   - f/u as outpt at [facility] with Dr. 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 Treviño     Patient seen and evaluated. As per nursing report patient had difficulty sleeping. On call ordered Trazadone. On approach patient calm and cooperative. No agitation f aggression noted. Complaints of insomnia last night. Patient states his mood is the same. Stared increased dose of Prozac today will continue to monitor. Discussed discharge option.  spoke to patient yesterday afternoon and provided patient information and PHP. Patient declined and is focused on getting his place “cleaned out. Also focused on receiving medical treatment for his medical issues that he has not addressed in over a year. Patient denies any suicidal/homicidal ideations. Denies any A/V hallucinations. Writer encouraged patient to attend groups. Patient initially declined group. Stated he was tired. Writer expressed that patient needs to work on his mental health while here and comply with treatment or he needs to be discharged. Patient then reluctantly agreed to go to group. Patient visible on the unit engaging with peers and reluctantly attending groups.    Per Hematology:  # Acute LLE DVT with H/O DVT on Xarelto   - B/L LE venous duplex doppler positive thrombus of the left popliteal vein   - per chart review in 2021patient was started on XARELTO but has patient has been off AC - cannot recall last dose; possibly after last refill when initially prescribed   - Recommend to continue XARELTO; due to poor history of compliance with XARELTO at this time it is unclear if acute dvt is due to AC failure or underlying coagulation disorder   - Brief Pharmacokinetic of XARELTO reviewed and compliance once discharged stressed to patient   - f/u as outpt at [facility] with Dr. 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 Treviño     Patient seen and evaluated. As per nursing report patient had difficulty sleeping. On call ordered Trazadone. On approach patient calm and cooperative. No agitation f aggression noted. Complaints of insomnia last night. Patient states his mood is the same. Stared increased dose of Prozac today will continue to monitor. Discussed discharge option.  spoke to patient yesterday afternoon and provided patient information and PHP. Patient declined and is focused on getting his place “cleaned out. Also focused on receiving medical treatment for his medical issues that he has not addressed in over a year. Patient denies any suicidal/homicidal ideations. Denies any A/V hallucinations. Writer encouraged patient to attend groups. Patient initially declined group. Stated he was tired. Writer expressed that patient needs to work on his mental health while here and comply with treatment or he needs to be discharged. Patient then reluctantly agreed to go to group. Patient visible on the unit engaging with peers and reluctantly attending groups.    PT recommended a walker due to bilateral neuropathy. Patient ambulates without a walker.    Per Hematology:  # Acute LLE DVT with H/O DVT on Xarelto   - B/L LE venous duplex doppler positive thrombus of the left popliteal vein   - per chart review in 2021patient was started on XARELTO but has patient has been off AC - cannot recall last dose; possibly after last refill when initially prescribed   - Recommend to continue XARELTO; due to poor history of compliance with XARELTO at this time it is unclear if acute dvt is due to AC failure or underlying coagulation disorder   - Brief Pharmacokinetic of XARELTO reviewed and compliance once discharged stressed to patient   - f/u as outpt at [facility] with Dr. 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 Treviño     Patient seen and evaluated. As per nursing report patient had difficulty sleeping. On call ordered Trazadone. On approach patient calm and cooperative. No agitation f aggression noted. Complaints of insomnia last night. Patient states his mood is the same. Stared increased dose of Prozac today will continue to monitor. Discussed discharge option.  spoke to patient yesterday afternoon and provided patient information and PHP. Patient declined and is focused on getting his place “cleaned out. Also focused on receiving medical treatment for his medical issues that he has not addressed in over a year. Patient denies any suicidal/homicidal ideations. Denies any A/V hallucinations. Writer encouraged patient to attend groups. Patient initially declined group. Stated he was tired. Writer expressed that patient needs to work on his mental health while here and comply with treatment or he needs to be discharged. Patient then reluctantly agreed to go to group. Patient visible on the unit engaging with peers and reluctantly attending groups.    PT recommended a walker due to bilateral neuropathy. Patient ambulates without a walker.    Per Hematology:  # Acute LLE DVT with H/O DVT on Xarelto   - B/L LE venous duplex doppler positive thrombus of the left popliteal vein   - per chart review in 2021patient was started on XARELTO but has patient has been off AC - cannot recall last dose; possibly after last refill when initially prescribed   - Recommend to continue XARELTO; due to poor history of compliance with XARELTO at this time it is unclear if acute dvt is due to AC failure or underlying coagulation disorder   - Brief Pharmacokinetic of XARELTO reviewed and compliance once discharged stressed to patient   - f/u as outpt at [facility] with Dr. 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 Treviño

## 2023-12-06 NOTE — CONSULT NOTE ADULT - SUBJECTIVE AND OBJECTIVE BOX
IONA MCGEE     58y     Male    MRN-[id number]                                                           CC:Patient is a 58y old  Male who presents with a chief complaint of Anxiety/ Depression with SI (05 Dec 2023 11:48)      HPI: 58M hx depression, scoliosis, back injury, DVT with c/o worsening pain on both feet. He was seen by neurologist before and prescribed gabapentin which patient states helped. He was depressed and did not take meds x 1 year.      ROS:  Constitutional, Neurological, Psychiatric, Eyes, ENT, Cardiovascular, Respiratory, Gastrointestinal, Genitourinary, Musculoskeletal, Integumentary, Endocrine and Heme/Lymph are otherwise negative.     Social History: No smoking, No drinking, No drug use    FAMILY HISTORY:      HEALTH ISSUES - PROBLEM Dx:  Depression, unspecified depression type    Tobacco use disorder    scoliosis  back injury        Vital Signs Last 24 Hrs  T(C): 35.4 (06 Dec 2023 07:35), Max: 35.8 (05 Dec 2023 14:32)  T(F): 95.8 (06 Dec 2023 07:35), Max: 96.5 (05 Dec 2023 14:32)  HR: 61 (06 Dec 2023 07:35) (61 - 62)  BP: 94/72 (06 Dec 2023 07:35) (94/72 - 117/78)  BP(mean): --  RR: 18 (06 Dec 2023 07:35) (16 - 18)  SpO2: --          Neuro Exam:  Orientation: oriented to person, oriented to place and oriented to time.   Attention: normal concentrating ability and visual attention was not decreased.   Language: no difficulty naming common objects, no difficulty repeating a phrase, no difficulty writing a sentence, fluency intact, comprehension intact and reading intact.   Fund of knowledge: displays adequate knowledge of personal past history.   Cranial Nerves: visual acuity intact bilaterally, visual fields full to confrontation, pupils equal round and reactive to light, extraocular motion intact, facial sensation intact symmetrically, face symmetrical, hearing was intact bilaterally, tongue and palate midline, head turning and shoulder shrug symmetric and there was no tongue deviation with protrusion.   Motor: muscle tone was normal in all four extremities, muscle strength was normal in all four extremities and normal bulk in all four extremities.   Sensory exam: light touch was intact.   Coordination:. normal gait. balance was intact. there was no past-pointing. no tremor present.   TTP heel R foot    Allergies    No Known Allergies       Home Medications:  calcium-vitamin D:  (30 Aug 2021 01:52)  DIAZEPAM 10 MG TABLET: TAKE 1 TO 2 TABLETS BY MOUTH AT BEDTIME (30 Aug 2021 01:52)  GABAPENTIN 600MG TABLET: TAKE 1 TABLET BY MOUTH TWICE DAILY (30 Aug 2021 01:52)  OXYCODONE HCL 20MG TABLET: TAKE 1 TABLET BY MOUTH THREE TIMES A DAY  MAX 3 TABLETS PER DAY (30 Aug 2021 01:52)  PROzac 10 mg oral capsule: 1 cap(s) orally once a day (30 Aug 2021 01:52)  QUETIAPINE FUMARATE 200 MG TAB: 1 each orally once a day (at bedtime) (30 Aug 2021 01:52)  SIMVASTATIN 20 MG TABLET: TAKE 1 TABLET(S) BY MOUTH ONCE A DAY FOR 90 DAYS (30 Aug 2021 01:52)  TAMSULOSIN   CAP 0.4:  (30 Aug 2021 01:52)  XARELTO 20 MG TABLET: TAKE 1 TABLET BY MOUTH EVERY DAY WITH FOOD (30 Aug 2021 01:52)      MEDICATIONS  (STANDING):  FLUoxetine 40 milliGRAM(s) Oral daily  gabapentin 300 milliGRAM(s) Oral three times a day  influenza   Vaccine 0.5 milliLiter(s) IntraMuscular once  pantoprazole    Tablet 40 milliGRAM(s) Oral before breakfast  rivaroxaban 15 milliGRAM(s) Oral two times a day with meals  traZODone 100 milliGRAM(s) Oral at bedtime    MEDICATIONS  (PRN):  acetaminophen     Tablet .. 650 milliGRAM(s) Oral every 6 hours PRN Mild Pain (1 - 3), Moderate Pain (4 - 6)  haloperidol     Tablet 5 milliGRAM(s) Oral every 8 hours PRN agitation  LORazepam     Tablet 2 milliGRAM(s) Oral every 8 hours PRN Aggression  nicotine  Polacrilex Gum 2 milliGRAM(s) Oral every 2 hours PRN withdrawals      LABS:          PT/INR - ( 05 Dec 2023 04:30 )   PT: 15.60 sec;   INR: 1.36 ratio         PTT - ( 05 Dec 2023 04:30 )  PTT:39.4 sec           IONA MCGEE     58y     Male    MRN-[id number]                                                           CC:Patient is a 58y old  Male who presents with a chief complaint of Anxiety/ Depression with SI (05 Dec 2023 11:48)      HPI: 58M hx depression, scoliosis, back injury, DVT with c/o worsening pain on both feet. He was seen by neurologist before and prescribed gabapentin which patient states helped. He was depressed and did not take meds x 1 year.      ROS:  Constitutional, Neurological, Psychiatric, Eyes, ENT, Cardiovascular, Respiratory, Gastrointestinal, Genitourinary, Musculoskeletal, Integumentary, Endocrine and Heme/Lymph are otherwise negative.     Social History: No smoking, No drinking, No drug use    FAMILY HISTORY:      HEALTH ISSUES - PROBLEM Dx:  Depression, unspecified depression type    Tobacco use disorder    scoliosis  back injury        Vital Signs Last 24 Hrs  T(C): 35.4 (06 Dec 2023 07:35), Max: 35.8 (05 Dec 2023 14:32)  T(F): 95.8 (06 Dec 2023 07:35), Max: 96.5 (05 Dec 2023 14:32)  HR: 61 (06 Dec 2023 07:35) (61 - 62)  BP: 94/72 (06 Dec 2023 07:35) (94/72 - 117/78)  BP(mean): --  RR: 18 (06 Dec 2023 07:35) (16 - 18)  SpO2: --          Neuro Exam:  Orientation: oriented to person, oriented to place and oriented to time.   Attention: normal concentrating ability and visual attention was not decreased.   Language: no difficulty naming common objects, no difficulty repeating a phrase, no difficulty writing a sentence, fluency intact, comprehension intact and reading intact.   Fund of knowledge: displays adequate knowledge of personal past history.   Cranial Nerves: visual acuity intact bilaterally, visual fields full to confrontation, pupils equal round and reactive to light, extraocular motion intact, facial sensation intact symmetrically, face symmetrical, hearing was intact bilaterally, tongue and palate midline, head turning and shoulder shrug symmetric and there was no tongue deviation with protrusion.   Motor: muscle tone was normal in all four extremities, muscle strength was normal in all four extremities and normal bulk in all four extremities.   Sensory exam: light touch was intact.   Coordination:. normal gait. balance was intact. there was no past-pointing. no tremor present.   TTP heel R foot    Allergies    No Known Allergies       Home Medications:  calcium-vitamin D:  (30 Aug 2021 01:52)  DIAZEPAM 10 MG TABLET: TAKE 1 TO 2 TABLETS BY MOUTH AT BEDTIME (30 Aug 2021 01:52)  GABAPENTIN 600MG TABLET: TAKE 1 TABLET BY MOUTH TWICE DAILY (30 Aug 2021 01:52)  OXYCODONE HCL 20MG TABLET: TAKE 1 TABLET BY MOUTH THREE TIMES A DAY  MAX 3 TABLETS PER DAY (30 Aug 2021 01:52)  PROzac 10 mg oral capsule: 1 cap(s) orally once a day (30 Aug 2021 01:52)  QUETIAPINE FUMARATE 200 MG TAB: 1 each orally once a day (at bedtime) (30 Aug 2021 01:52)  SIMVASTATIN 20 MG TABLET: TAKE 1 TABLET(S) BY MOUTH ONCE A DAY FOR 90 DAYS (30 Aug 2021 01:52)  TAMSULOSIN   CAP 0.4:  (30 Aug 2021 01:52)  XARELTO 20 MG TABLET: TAKE 1 TABLET BY MOUTH EVERY DAY WITH FOOD (30 Aug 2021 01:52)      MEDICATIONS  (STANDING):  FLUoxetine 40 milliGRAM(s) Oral daily  gabapentin 300 milliGRAM(s) Oral three times a day  influenza   Vaccine 0.5 milliLiter(s) IntraMuscular once  pantoprazole    Tablet 40 milliGRAM(s) Oral before breakfast  rivaroxaban 15 milliGRAM(s) Oral two times a day with meals  traZODone 100 milliGRAM(s) Oral at bedtime    MEDICATIONS  (PRN):  acetaminophen     Tablet .. 650 milliGRAM(s) Oral every 6 hours PRN Mild Pain (1 - 3), Moderate Pain (4 - 6)  haloperidol     Tablet 5 milliGRAM(s) Oral every 8 hours PRN agitation  LORazepam     Tablet 2 milliGRAM(s) Oral every 8 hours PRN Aggression  nicotine  Polacrilex Gum 2 milliGRAM(s) Oral every 2 hours PRN withdrawals      LABS:          PT/INR - ( 05 Dec 2023 04:30 )   PT: 15.60 sec;   INR: 1.36 ratio         PTT - ( 05 Dec 2023 04:30 )  PTT:39.4 sec

## 2023-12-06 NOTE — CONSULT NOTE ADULT - NS ATTEND AMEND GEN_ALL_CORE FT
Pt w/ long standing h/o neuropathy and lumbar stenosis currently admitted for depression off neuropathic pain meds over last 1.5 years since isolated and depressed currently with same symptoms plus isolated R heel pain with walking.  Recommend restart prior dose of gabapentin 800 mg TID and podiatry evaluation for R heel pain.  F/u with his neurologist Dr. Treviño after discharge for further dose management.
58-year-old male developed leg pain. Duplex revealed left leg popliteal vein DVT. Her reported h/o low extremity DVT in the past and took Xarelto. He also has a h/o right renal mass, s/p partial right nephrectomy in 2018. There is no pathology report available.      Continue Xarelto.   Followup as out patient upon discharge.   h/o renal mass, s/p partial right nephrectomy. Followup with urologist.

## 2023-12-06 NOTE — BH INPATIENT PSYCHIATRY PROGRESS NOTE - CURRENT MEDICATION
MEDICATIONS  (STANDING):  FLUoxetine 40 milliGRAM(s) Oral daily  gabapentin 300 milliGRAM(s) Oral three times a day  influenza   Vaccine 0.5 milliLiter(s) IntraMuscular once  pantoprazole    Tablet 40 milliGRAM(s) Oral before breakfast  rivaroxaban 15 milliGRAM(s) Oral two times a day with meals  traZODone 100 milliGRAM(s) Oral at bedtime    MEDICATIONS  (PRN):  acetaminophen     Tablet .. 650 milliGRAM(s) Oral every 6 hours PRN Mild Pain (1 - 3), Moderate Pain (4 - 6)  haloperidol     Tablet 5 milliGRAM(s) Oral every 8 hours PRN agitation  LORazepam     Tablet 2 milliGRAM(s) Oral every 8 hours PRN Aggression  nicotine  Polacrilex Gum 2 milliGRAM(s) Oral every 2 hours PRN withdrawals   MEDICATIONS  (STANDING):  FLUoxetine 40 milliGRAM(s) Oral daily  gabapentin 800 milliGRAM(s) Oral two times a day  influenza   Vaccine 0.5 milliLiter(s) IntraMuscular once  pantoprazole    Tablet 40 milliGRAM(s) Oral before breakfast  rivaroxaban 15 milliGRAM(s) Oral two times a day with meals  traZODone 100 milliGRAM(s) Oral at bedtime    MEDICATIONS  (PRN):  acetaminophen     Tablet .. 650 milliGRAM(s) Oral every 6 hours PRN Mild Pain (1 - 3), Moderate Pain (4 - 6)  haloperidol     Tablet 5 milliGRAM(s) Oral every 8 hours PRN agitation  LORazepam     Tablet 2 milliGRAM(s) Oral every 8 hours PRN Aggression  nicotine  Polacrilex Gum 2 milliGRAM(s) Oral every 2 hours PRN withdrawals

## 2023-12-06 NOTE — BH INPATIENT PSYCHIATRY PROGRESS NOTE - NSBHCHARTREVIEWVS_PSY_A_CORE FT
Vital Signs Last 24 Hrs  T(C): 35.4 (12-06-23 @ 07:35), Max: 35.8 (12-05-23 @ 14:32)  T(F): 95.8 (12-06-23 @ 07:35), Max: 96.5 (12-05-23 @ 14:32)  HR: 61 (12-06-23 @ 07:35) (61 - 62)  BP: 94/72 (12-06-23 @ 07:35) (94/72 - 117/78)  BP(mean): --  RR: 18 (12-06-23 @ 07:35) (16 - 18)  SpO2: --     Vital Signs Last 24 Hrs  T(C): 35.9 (12-06-23 @ 15:00), Max: 35.9 (12-06-23 @ 15:00)  T(F): 96.6 (12-06-23 @ 15:00), Max: 96.6 (12-06-23 @ 15:00)  HR: 79 (12-06-23 @ 15:00) (61 - 79)  BP: 81/67 (12-06-23 @ 15:00) (81/67 - 94/72)  BP(mean): --  RR: 16 (12-06-23 @ 15:00) (16 - 18)  SpO2: --

## 2023-12-06 NOTE — CONSULT NOTE ADULT - ASSESSMENT
58M hx depression, scoliosis, back injury, DVT with c/o worsening pain on both feet. He was seen by neurologist before and prescribed gabapentin which patient states helped in the past. He was depressed and did not take meds x 1 year. Exam + TTP R heel. Heel pain on ambulation.    # neuropathy 2/2 injury  # R heel pain  - can resume previous dose of gabapentin (can check with pharmacy) 800 mg tid  - recommend podiatry consult for heel pain  - no further inpatient neurologic intervention, recall if new neurologic concerns arise 58M hx depression, scoliosis, back injury, DVT with c/o worsening pain on both feet. He was seen by neurologist before and prescribed gabapentin which patient states helped in the past. He was depressed and did not take meds x 1 year. Exam + TTP R heel. Heel pain on ambulation.    # neuropathy 2/2 injury  # R heel pain  - can resume previous dose of gabapentin (can check with pharmacy) 800 mg tid  - recommend podiatry consult for heel pain  - no further inpatient neurologic intervention, recall if new neurologic concerns arise  - f.u outpatient Dr. Treviño 58M hx depression, scoliosis, back injury, DVT with c/o worsening pain on both feet. He was seen by neurologist before and prescribed gabapentin which patient states helped in the past. He was depressed and did not take meds x 1 year. Exam + TTP R heel. Heel pain on ambulation.    # neuropathy 2/2 injury  # R heel pain  - can resume previous dose of gabapentin 800 mg tid  - recommend podiatry consult for R heel pain  - no further inpatient neurologic intervention, recall if new neurologic concerns arise  - f.u outpatient Dr. Treviño s/p discharge

## 2023-12-06 NOTE — CONSULT NOTE ADULT - SUBJECTIVE AND OBJECTIVE BOX
Podiatry Consult Note    Subjective:  IONA MCGEE is a pleasant well-nourished, well developed 58y Male in no acute distress, alert awake, and oriented to person, place and time.   Patient is a 58y old  Male who presents with a chief complaint of Anxiety/ Depression with SI (05 Dec 2023 11:48). Pt seen & evaluated at bedside. Pt c/o R foot numbness & L foot tingling x 4 months. Secondary complaint of heel  pain.  Reports h/o spinal injury 10 y ago. Has a podiatrist in the city. No other pedal complaints.     HPI:      Past Medical History and Surgical History  PAST MEDICAL & SURGICAL HISTORY:  Chronic back pain      History of DVT (deep vein thrombosis)      Drug abuse      H/O right nephrectomy      Objective:  Vital Signs Last 24 Hrs  T(C): 35.9 (06 Dec 2023 15:00), Max: 35.9 (06 Dec 2023 15:00)  T(F): 96.6 (06 Dec 2023 15:00), Max: 96.6 (06 Dec 2023 15:00)  HR: 79 (06 Dec 2023 15:00) (61 - 79)  BP: 81/67 (06 Dec 2023 15:00) (81/67 - 94/72)  BP(mean): --  RR: 16 (06 Dec 2023 15:00) (16 - 18)  SpO2: --                              Physical Exam - Lower Extremity Focused:   #B/L LE  Derm:   No open wounds or lesions.   Mild xerosis   Nails normotrophic & non-elongated x10    Vascular: DP and PT Pulses Diminished; Foot is Warm to Warm to the touch. Mild 1+ pitting edema B/L LE.  Neuro: Protective sensation intact  MSK: No gross skeletal deformities. Mild TTP to R plantar central midfoot. No TTP to plantar medial instep, with heel squeeze, or calcaneal tubercle b/l.     Assessment:  B/L LE Neuropathy, likely 2/2 spinal injury  Heel pain  BL LE edema    Plan:  Chart reviewed and Patient evaluated. All Questions and Concerns Addressed and Answered  Discussed diagnosis and treatment with patient  BL LE Neuropathy likely 2/2 past spinal/back injury. Continue gabapentin  LE Edema -> Elevate BL LE when at rest. ACE bandage difficult to obtain in Psych Unit.   Heel Pain: DDx plantar fasciitis. Supportive shoegear. Limit barefoot walking.   Patient stable from Podiatry standpoint  Pt can f/u in o/p Podiatry clinic for routine f/u; possible steroid injection; at [address], [place], 1 week post-DSC  No further intervention per Podiatry  Discussed Plan w/ Attenidng    Podiatry

## 2023-12-06 NOTE — BH INPATIENT PSYCHIATRY PROGRESS NOTE - NSBHMETABOLIC_PSY_ALL_CORE_FT
BMI: BMI (kg/m2): 38.9 (12-05-23 @ 11:48)  HbA1c: A1C with Estimated Average Glucose Result: 5.2 % (12-03-23 @ 08:35)    Glucose:   BP: 94/72 (12-06-23 @ 07:35) (94/72 - 123/83)Vital Signs Last 24 Hrs  T(C): 35.4 (12-06-23 @ 07:35), Max: 35.8 (12-05-23 @ 14:32)  T(F): 95.8 (12-06-23 @ 07:35), Max: 96.5 (12-05-23 @ 14:32)  HR: 61 (12-06-23 @ 07:35) (61 - 62)  BP: 94/72 (12-06-23 @ 07:35) (94/72 - 117/78)  BP(mean): --  RR: 18 (12-06-23 @ 07:35) (16 - 18)  SpO2: --      Lipid Panel: Date/Time: 12-03-23 @ 08:35  Cholesterol, Serum: 167  LDL Cholesterol Calculated: 115  HDL Cholesterol, Serum: 36  Total Cholesterol/HDL Ration Measurement: --  Triglycerides, Serum: 79   BMI: BMI (kg/m2): 38.9 (12-05-23 @ 11:48)  HbA1c: A1C with Estimated Average Glucose Result: 5.2 % (12-03-23 @ 08:35)    Glucose:   BP: 81/67 (12-06-23 @ 15:00) (81/67 - 123/83)Vital Signs Last 24 Hrs  T(C): 35.9 (12-06-23 @ 15:00), Max: 35.9 (12-06-23 @ 15:00)  T(F): 96.6 (12-06-23 @ 15:00), Max: 96.6 (12-06-23 @ 15:00)  HR: 79 (12-06-23 @ 15:00) (61 - 79)  BP: 81/67 (12-06-23 @ 15:00) (81/67 - 94/72)  BP(mean): --  RR: 16 (12-06-23 @ 15:00) (16 - 18)  SpO2: --      Lipid Panel: Date/Time: 12-03-23 @ 08:35  Cholesterol, Serum: 167  LDL Cholesterol Calculated: 115  HDL Cholesterol, Serum: 36  Total Cholesterol/HDL Ration Measurement: --  Triglycerides, Serum: 79

## 2023-12-07 PROCEDURE — 99232 SBSQ HOSP IP/OBS MODERATE 35: CPT

## 2023-12-07 RX ADMIN — Medication 650 MILLIGRAM(S): at 12:19

## 2023-12-07 RX ADMIN — Medication 40 MILLIGRAM(S): at 08:14

## 2023-12-07 RX ADMIN — Medication 650 MILLIGRAM(S): at 06:45

## 2023-12-07 RX ADMIN — Medication 650 MILLIGRAM(S): at 20:54

## 2023-12-07 RX ADMIN — RIVAROXABAN 15 MILLIGRAM(S): KIT at 08:14

## 2023-12-07 RX ADMIN — RIVAROXABAN 15 MILLIGRAM(S): KIT at 16:41

## 2023-12-07 RX ADMIN — GABAPENTIN 800 MILLIGRAM(S): 400 CAPSULE ORAL at 08:14

## 2023-12-07 RX ADMIN — Medication 650 MILLIGRAM(S): at 15:16

## 2023-12-07 RX ADMIN — PANTOPRAZOLE SODIUM 40 MILLIGRAM(S): 20 TABLET, DELAYED RELEASE ORAL at 06:19

## 2023-12-07 RX ADMIN — Medication 100 MILLIGRAM(S): at 20:54

## 2023-12-07 RX ADMIN — GABAPENTIN 800 MILLIGRAM(S): 400 CAPSULE ORAL at 20:54

## 2023-12-07 RX ADMIN — Medication 650 MILLIGRAM(S): at 21:50

## 2023-12-07 RX ADMIN — Medication 650 MILLIGRAM(S): at 06:18

## 2023-12-07 NOTE — BH INPATIENT PSYCHIATRY PROGRESS NOTE - NSBHCHARTREVIEWVS_PSY_A_CORE FT
Vital Signs Last 24 Hrs  T(C): 35.8 (12-07-23 @ 07:52), Max: 35.9 (12-06-23 @ 15:00)  T(F): 96.4 (12-07-23 @ 07:52), Max: 96.6 (12-06-23 @ 15:00)  HR: 106 (12-07-23 @ 07:52) (79 - 106)  BP: 115/67 (12-07-23 @ 07:52) (81/67 - 115/67)  BP(mean): --  RR: 16 (12-07-23 @ 07:52) (16 - 16)  SpO2: --     Vital Signs Last 24 Hrs  T(C): 35.8 (12-07-23 @ 07:52), Max: 35.8 (12-07-23 @ 07:52)  T(F): 96.4 (12-07-23 @ 07:52), Max: 96.4 (12-07-23 @ 07:52)  HR: 106 (12-07-23 @ 07:52) (106 - 106)  BP: 115/67 (12-07-23 @ 07:52) (115/67 - 115/67)  BP(mean): --  RR: 16 (12-07-23 @ 07:52) (16 - 16)  SpO2: --

## 2023-12-07 NOTE — BH INPATIENT PSYCHIATRY PROGRESS NOTE - NSBHASSESSSUMMFT_PSY_ALL_CORE
The patient is a 58-year-old male; ; domiciled alone; has 2 adult daughters; on disability/pension >10 years for injury while at work; self-reported PPHx of depression, denies prior ED visits or admissions, hx of SA; PMHx of renal cancer, back pain; BIB EMS; psychiatry consulted for SI.  Pt with worsening depression and decline in functioning over the past year, currently with SI and admitted for the same.     Patient seen and evaluated. As per nursing report no acute events. On approach patient calm and cooperative. No agitation f aggression noted. Patient appears to request any services he feels he can get. Patient did not follow up with medical issues for the past year now requesting multiple things. Patient has complaints of being on a DASH/TLC diet. Patient request Flu shot and Covid shot. Patient again states his mood is the same, expresses depression and anxiety. However, patient does not appear depressed. Frequently seen in the day room and in the halls laughing and engaging with peers. Prozac increased yesterday, will continue to monitor. Every time discharged is discussed patient states he is not ready and bring up needing to get his place cleaned out. Declined PHP. Patient denies any suicidal/homicidal ideations. Denies any A/V hallucinations. Patient visible on the unit engaging with peers and attending groups.    PT recommended a walker due to bilateral neuropathy. Patient ambulates without a walker.    Per Hematology:  # Acute LLE DVT with H/O DVT on Xarelto   - B/L LE venous duplex doppler positive thrombus of the left popliteal vein   - per chart review in 2021patient was started on XARELTO but has patient has been off AC - cannot recall last dose; possibly after last refill when initially prescribed   - Recommend to continue XARELTO; due to poor history of compliance with XARELTO at this time it is unclear if acute dvt is due to AC failure or underlying coagulation disorder   - Brief Pharmacokinetic of XARELTO reviewed and compliance once discharged stressed to patient   - f/u as outpt at [facility] with Dr. 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 Treviño    #Admit    #MDD  -Prozac 20mg daily--> 40mg daily 12/6/23  -Trazodone 100mg for insomnia    -Haldol 5mg Q8 PRN for agitation  -Lorazepam 2mg Q8 PRN for aggression    ##Tobacco use disorder  -Nicotine Gum PRN    -Pantoprazole  -Gabapentin  -Tylenol PRN The patient is a 58-year-old male; ; domiciled alone; has 2 adult daughters; on disability/pension >10 years for injury while at work; self-reported PPHx of depression, denies prior ED visits or admissions, hx of SA; PMHx of renal cancer, back pain; BIB EMS; psychiatry consulted for SI.  Pt with worsening depression and decline in functioning over the past year, currently with SI and admitted for the same.     Patient seen and evaluated. As per nursing report no acute events. On approach patient calm and cooperative. No agitation f aggression noted. Patient appears to request any services he feels he can get. Patient did not follow up with medical issues for the past year now requesting multiple things. Patient has complaints of being on a DASH/TLC diet. Patient request Flu shot and Covid shot. Patient again states his mood is the same, expresses depression and anxiety. However, patient does not appear depressed. Frequently seen in the day room and in the halls laughing and engaging with peers. Prozac increased yesterday, will continue to monitor. Every time discharged is discussed patient states he is not ready and bring up needing to get his place cleaned out. Declined PHP. Patient denies any suicidal/homicidal ideations. Denies any A/V hallucinations. Patient visible on the unit engaging with peers and attending groups.    PT recommended a walker due to bilateral neuropathy. Patient ambulates without a walker.    Per Hematology:  # Acute LLE DVT with H/O DVT on Xarelto   - B/L LE venous duplex doppler positive thrombus of the left popliteal vein   - per chart review in 2021patient was started on XARELTO but has patient has been off AC - cannot recall last dose; possibly after last refill when initially prescribed   - Recommend to continue XARELTO; due to poor history of compliance with XARELTO at this time it is unclear if acute dvt is due to AC failure or underlying coagulation disorder   - Brief Pharmacokinetic of XARELTO reviewed and compliance once discharged stressed to patient   - f/u as outpt at [facility] with Dr. 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 Treviño    #Admit    #MDD  -Prozac 20mg daily--> 40mg daily 12/6/23  -Trazodone 100mg for insomnia    -Haldol 5mg Q8 PRN for agitation  -Lorazepam 2mg Q8 PRN for aggression    ##Tobacco use disorder  -Nicotine Gum PRN    -Pantoprazole  -Gabapentin  -Tylenol PRN The patient is a 58-year-old male; ; domiciled alone; has 2 adult daughters; on disability/pension >10 years for injury while at work; self-reported PPHx of depression, denies prior ED visits or admissions, hx of SA; PMHx of renal cancer, back pain; BIB EMS; psychiatry consulted for SI.  Pt with worsening depression and decline in functioning over the past year, currently with SI and admitted for the same.     Patient seen and evaluated. As per nursing report no acute events. On approach patient calm and cooperative. No agitation f aggression noted. Patient appears to request any services he feels he can get. Patient did not follow up with medical issues for the past year now requesting multiple things. Patient has complaints of being on a DASH/TLC diet. Patient request Flu shot and Covid shot. Patient again states his mood is the same, expresses depression and anxiety. However, patient does not appear depressed. Frequently seen in the day room and in the halls laughing and engaging with peers. Prozac increased yesterday, will continue to monitor. Every time discharged is discussed patient states he is not ready and bring up needing to get his place cleaned out. Declined PHP. Patient denies any suicidal/homicidal ideations. Denies any A/V hallucinations. Patient visible on the unit engaging with peers and attending groups.    PT recommended a walker due to bilateral neuropathy. Patient ambulates without a walker.    Spoke to patients ex wife Machelle [phone]. Updated her on patients care and progress.    Per Hematology:  # Acute LLE DVT with H/O DVT on Xarelto   - B/L LE venous duplex doppler positive thrombus of the left popliteal vein   - per chart review in 2021patient was started on XARELTO but has patient has been off AC - cannot recall last dose; possibly after last refill when initially prescribed   - Recommend to continue XARELTO; due to poor history of compliance with XARELTO at this time it is unclear if acute dvt is due to AC failure or underlying coagulation disorder   - Brief Pharmacokinetic of XARELTO reviewed and compliance once discharged stressed to patient   - f/u as outpt at [facility] with Dr. 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 Treviño    #Admit    #MDD  -Prozac 20mg daily--> 40mg daily 12/6/23  -Trazodone 100mg for insomnia    -Haldol 5mg Q8 PRN for agitation  -Lorazepam 2mg Q8 PRN for aggression    ##Tobacco use disorder  -Nicotine Gum PRN    -Pantoprazole  -Gabapentin  -Tylenol PRN The patient is a 58-year-old male; ; domiciled alone; has 2 adult daughters; on disability/pension >10 years for injury while at work; self-reported PPHx of depression, denies prior ED visits or admissions, hx of SA; PMHx of renal cancer, back pain; BIB EMS; psychiatry consulted for SI.  Pt with worsening depression and decline in functioning over the past year, currently with SI and admitted for the same.     Patient seen and evaluated. As per nursing report no acute events. On approach patient calm and cooperative. No agitation f aggression noted. Patient appears to request any services he feels he can get. Patient did not follow up with medical issues for the past year now requesting multiple things. Patient has complaints of being on a DASH/TLC diet. Patient request Flu shot and Covid shot. Patient again states his mood is the same, expresses depression and anxiety. However, patient does not appear depressed. Frequently seen in the day room and in the halls laughing and engaging with peers. Prozac increased yesterday, will continue to monitor. Every time discharged is discussed patient states he is not ready and bring up needing to get his place cleaned out. Declined PHP. Patient denies any suicidal/homicidal ideations. Denies any A/V hallucinations. Patient visible on the unit engaging with peers and attending groups.    PT recommended a walker due to bilateral neuropathy. Patient ambulates without a walker.    Spoke to patients ex wife Machelle [phone]. Updated her on patients care and progress.    Per Hematology:  # Acute LLE DVT with H/O DVT on Xarelto   - B/L LE venous duplex doppler positive thrombus of the left popliteal vein   - per chart review in 2021patient was started on XARELTO but has patient has been off AC - cannot recall last dose; possibly after last refill when initially prescribed   - Recommend to continue XARELTO; due to poor history of compliance with XARELTO at this time it is unclear if acute dvt is due to AC failure or underlying coagulation disorder   - Brief Pharmacokinetic of XARELTO reviewed and compliance once discharged stressed to patient   - f/u as outpt at [facility] with Dr. 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 Treviño    #Admit    #MDD  -Prozac 20mg daily--> 40mg daily 12/6/23  -Trazodone 100mg for insomnia    -Haldol 5mg Q8 PRN for agitation  -Lorazepam 2mg Q8 PRN for aggression    ##Tobacco use disorder  -Nicotine Gum PRN    -Pantoprazole  -Gabapentin  -Tylenol PRN

## 2023-12-07 NOTE — BH INPATIENT PSYCHIATRY PROGRESS NOTE - NSBHFUPINTERVALHXFT_PSY_A_CORE
Patient seen and evaluated. As per nursing report no acute events. On approach patient calm and cooperative. No agitation f aggression noted. Patient appears to request any services he feels he can get. Patient did not follow up with medical issues for the past year now requesting multiple things. Patient has complaints of being on a DASH/TLC diet. Patient request Flu shot and Covid shot. Patient again states his mood is the same, expresses depression and anxiety. However, patient does not appear depressed. Frequently seen in the day room and in the halls laughing and engaging with peers. Prozac increased yesterday, will continue to monitor. Every time discharged is discussed patient states he is not ready and bring up needing to get his place cleaned out. Declined PHP. Patient denies any suicidal/homicidal ideations. Denies any A/V hallucinations. Patient visible on the unit engaging with peers and attending groups.    PT recommended a walker due to bilateral neuropathy. Patient ambulates without a walker.    Per Hematology:  # Acute LLE DVT with H/O DVT on Xarelto   - B/L LE venous duplex doppler positive thrombus of the left popliteal vein   - per chart review in 2021patient was started on XARELTO but has patient has been off AC - cannot recall last dose; possibly after last refill when initially prescribed   - Recommend to continue XARELTO; due to poor history of compliance with XARELTO at this time it is unclear if acute dvt is due to AC failure or underlying coagulation disorder   - Brief Pharmacokinetic of XARELTO reviewed and compliance once discharged stressed to patient   - f/u as outpt at [facility] with Dr. 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 Treviño     Patient seen and evaluated. As per nursing report no acute events. On approach patient calm and cooperative. No agitation f aggression noted. Patient appears to request any services he feels he can get. Patient did not follow up with medical issues for the past year now requesting multiple things. Patient has complaints of being on a DASH/TLC diet. Patient request Flu shot and Covid shot. Patient again states his mood is the same, expresses depression and anxiety. However, patient does not appear depressed. Frequently seen in the day room and in the halls laughing and engaging with peers. Prozac increased yesterday, will continue to monitor. Every time discharged is discussed patient states he is not ready and bring up needing to get his place cleaned out. Declined PHP. Patient denies any suicidal/homicidal ideations. Denies any A/V hallucinations. Patient visible on the unit engaging with peers and attending groups.    PT recommended a walker due to bilateral neuropathy. Patient ambulates without a walker.    Per Hematology:  # Acute LLE DVT with H/O DVT on Xarelto   - B/L LE venous duplex doppler positive thrombus of the left popliteal vein   - per chart review in 2021patient was started on XARELTO but has patient has been off AC - cannot recall last dose; possibly after last refill when initially prescribed   - Recommend to continue XARELTO; due to poor history of compliance with XARELTO at this time it is unclear if acute dvt is due to AC failure or underlying coagulation disorder   - Brief Pharmacokinetic of XARELTO reviewed and compliance once discharged stressed to patient   - f/u as outpt at [facility] with Dr. 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 Treviño     Patient seen and evaluated. As per nursing report no acute events. On approach patient calm and cooperative. No agitation f aggression noted. Patient appears to request any services he feels he can get. Patient did not follow up with medical issues for the past year now requesting multiple things. Patient has complaints of being on a DASH/TLC diet. Patient request Flu shot and Covid shot. Patient again states his mood is the same, expresses depression and anxiety. However, patient does not appear depressed. Frequently seen in the day room and in the halls laughing and engaging with peers. Prozac increased yesterday, will continue to monitor. Every time discharged is discussed patient states he is not ready and bring up needing to get his place cleaned out. Declined PHP. Patient denies any suicidal/homicidal ideations. Denies any A/V hallucinations. Patient visible on the unit engaging with peers and attending groups.    PT recommended a walker due to bilateral neuropathy. Patient ambulates without a walker.    Spoke to patients ex wife Machelle [phone]. Updated her on patients care and progress.    Per Hematology:  # Acute LLE DVT with H/O DVT on Xarelto   - B/L LE venous duplex doppler positive thrombus of the left popliteal vein   - per chart review in 2021patient was started on XARELTO but has patient has been off AC - cannot recall last dose; possibly after last refill when initially prescribed   - Recommend to continue XARELTO; due to poor history of compliance with XARELTO at this time it is unclear if acute dvt is due to AC failure or underlying coagulation disorder   - Brief Pharmacokinetic of XARELTO reviewed and compliance once discharged stressed to patient   - f/u as outpt at [facility] with Dr. 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 Treviño     Patient seen and evaluated. As per nursing report no acute events. On approach patient calm and cooperative. No agitation f aggression noted. Patient appears to request any services he feels he can get. Patient did not follow up with medical issues for the past year now requesting multiple things. Patient has complaints of being on a DASH/TLC diet. Patient request Flu shot and Covid shot. Patient again states his mood is the same, expresses depression and anxiety. However, patient does not appear depressed. Frequently seen in the day room and in the halls laughing and engaging with peers. Prozac increased yesterday, will continue to monitor. Every time discharged is discussed patient states he is not ready and bring up needing to get his place cleaned out. Declined PHP. Patient denies any suicidal/homicidal ideations. Denies any A/V hallucinations. Patient visible on the unit engaging with peers and attending groups.    PT recommended a walker due to bilateral neuropathy. Patient ambulates without a walker.    Spoke to patients ex wife Machelle [phone]. Updated her on patients care and progress.    Per Hematology:  # Acute LLE DVT with H/O DVT on Xarelto   - B/L LE venous duplex doppler positive thrombus of the left popliteal vein   - per chart review in 2021patient was started on XARELTO but has patient has been off AC - cannot recall last dose; possibly after last refill when initially prescribed   - Recommend to continue XARELTO; due to poor history of compliance with XARELTO at this time it is unclear if acute dvt is due to AC failure or underlying coagulation disorder   - Brief Pharmacokinetic of XARELTO reviewed and compliance once discharged stressed to patient   - f/u as outpt at [facility] with Dr. 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 Treviño

## 2023-12-07 NOTE — BH INPATIENT PSYCHIATRY PROGRESS NOTE - NSBHMETABOLIC_PSY_ALL_CORE_FT
BMI: BMI (kg/m2): 38.9 (12-05-23 @ 11:48)  HbA1c: A1C with Estimated Average Glucose Result: 5.2 % (12-03-23 @ 08:35)    Glucose:   BP: 115/67 (12-07-23 @ 07:52) (81/67 - 121/72)Vital Signs Last 24 Hrs  T(C): 35.8 (12-07-23 @ 07:52), Max: 35.9 (12-06-23 @ 15:00)  T(F): 96.4 (12-07-23 @ 07:52), Max: 96.6 (12-06-23 @ 15:00)  HR: 106 (12-07-23 @ 07:52) (79 - 106)  BP: 115/67 (12-07-23 @ 07:52) (81/67 - 115/67)  BP(mean): --  RR: 16 (12-07-23 @ 07:52) (16 - 16)  SpO2: --      Lipid Panel: Date/Time: 12-03-23 @ 08:35  Cholesterol, Serum: 167  LDL Cholesterol Calculated: 115  HDL Cholesterol, Serum: 36  Total Cholesterol/HDL Ration Measurement: --  Triglycerides, Serum: 79   BMI: BMI (kg/m2): 38.9 (12-05-23 @ 11:48)  HbA1c: A1C with Estimated Average Glucose Result: 5.2 % (12-03-23 @ 08:35)    Glucose:   BP: 115/67 (12-07-23 @ 07:52) (81/67 - 121/72)Vital Signs Last 24 Hrs  T(C): 35.8 (12-07-23 @ 07:52), Max: 35.8 (12-07-23 @ 07:52)  T(F): 96.4 (12-07-23 @ 07:52), Max: 96.4 (12-07-23 @ 07:52)  HR: 106 (12-07-23 @ 07:52) (106 - 106)  BP: 115/67 (12-07-23 @ 07:52) (115/67 - 115/67)  BP(mean): --  RR: 16 (12-07-23 @ 07:52) (16 - 16)  SpO2: --      Lipid Panel: Date/Time: 12-03-23 @ 08:35  Cholesterol, Serum: 167  LDL Cholesterol Calculated: 115  HDL Cholesterol, Serum: 36  Total Cholesterol/HDL Ration Measurement: --  Triglycerides, Serum: 79

## 2023-12-07 NOTE — BH INPATIENT PSYCHIATRY PROGRESS NOTE - CURRENT MEDICATION
MEDICATIONS  (STANDING):  FLUoxetine 40 milliGRAM(s) Oral daily  gabapentin 800 milliGRAM(s) Oral two times a day  influenza   Vaccine 0.5 milliLiter(s) IntraMuscular once  pantoprazole    Tablet 40 milliGRAM(s) Oral before breakfast  rivaroxaban 15 milliGRAM(s) Oral two times a day with meals  traZODone 100 milliGRAM(s) Oral at bedtime    MEDICATIONS  (PRN):  acetaminophen     Tablet .. 650 milliGRAM(s) Oral every 6 hours PRN Mild Pain (1 - 3), Moderate Pain (4 - 6)  haloperidol     Tablet 5 milliGRAM(s) Oral every 8 hours PRN agitation  LORazepam     Tablet 2 milliGRAM(s) Oral every 8 hours PRN Aggression  nicotine  Polacrilex Gum 2 milliGRAM(s) Oral every 2 hours PRN withdrawals

## 2023-12-08 PROCEDURE — 99232 SBSQ HOSP IP/OBS MODERATE 35: CPT

## 2023-12-08 RX ORDER — POLYETHYLENE GLYCOL 3350 17 G/17G
17 POWDER, FOR SOLUTION ORAL DAILY
Refills: 0 | Status: DISCONTINUED | OUTPATIENT
Start: 2023-12-08 | End: 2023-12-15

## 2023-12-08 RX ADMIN — RIVAROXABAN 15 MILLIGRAM(S): KIT at 17:36

## 2023-12-08 RX ADMIN — GABAPENTIN 800 MILLIGRAM(S): 400 CAPSULE ORAL at 20:32

## 2023-12-08 RX ADMIN — POLYETHYLENE GLYCOL 3350 17 GRAM(S): 17 POWDER, FOR SOLUTION ORAL at 11:53

## 2023-12-08 RX ADMIN — Medication 650 MILLIGRAM(S): at 18:27

## 2023-12-08 RX ADMIN — PANTOPRAZOLE SODIUM 40 MILLIGRAM(S): 20 TABLET, DELAYED RELEASE ORAL at 06:38

## 2023-12-08 RX ADMIN — Medication 100 MILLIGRAM(S): at 22:12

## 2023-12-08 RX ADMIN — Medication 40 MILLIGRAM(S): at 08:16

## 2023-12-08 RX ADMIN — GABAPENTIN 800 MILLIGRAM(S): 400 CAPSULE ORAL at 08:17

## 2023-12-08 RX ADMIN — Medication 650 MILLIGRAM(S): at 18:21

## 2023-12-08 RX ADMIN — RIVAROXABAN 15 MILLIGRAM(S): KIT at 08:17

## 2023-12-08 NOTE — BH INPATIENT PSYCHIATRY PROGRESS NOTE - NSBHMETABOLIC_PSY_ALL_CORE_FT
BMI: BMI (kg/m2): 38.9 (12-05-23 @ 11:48)  HbA1c: A1C with Estimated Average Glucose Result: 5.2 % (12-03-23 @ 08:35)    Glucose:   BP: 119/57 (12-07-23 @ 15:12) (81/67 - 119/57)Vital Signs Last 24 Hrs  T(C): 35.9 (12-07-23 @ 15:12), Max: 35.9 (12-07-23 @ 15:12)  T(F): 96.6 (12-07-23 @ 15:12), Max: 96.6 (12-07-23 @ 15:12)  HR: 78 (12-07-23 @ 15:12) (78 - 78)  BP: 119/57 (12-07-23 @ 15:12) (119/57 - 119/57)  BP(mean): --  RR: 18 (12-07-23 @ 15:12) (18 - 18)  SpO2: --      Lipid Panel: Date/Time: 12-03-23 @ 08:35  Cholesterol, Serum: 167  LDL Cholesterol Calculated: 115  HDL Cholesterol, Serum: 36  Total Cholesterol/HDL Ration Measurement: --  Triglycerides, Serum: 79

## 2023-12-08 NOTE — BH INPATIENT PSYCHIATRY PROGRESS NOTE - CURRENT MEDICATION
MEDICATIONS  (STANDING):  FLUoxetine 40 milliGRAM(s) Oral daily  gabapentin 800 milliGRAM(s) Oral two times a day  influenza   Vaccine 0.5 milliLiter(s) IntraMuscular once  pantoprazole    Tablet 40 milliGRAM(s) Oral before breakfast  rivaroxaban 15 milliGRAM(s) Oral two times a day with meals  traZODone 100 milliGRAM(s) Oral at bedtime    MEDICATIONS  (PRN):  acetaminophen     Tablet .. 650 milliGRAM(s) Oral every 6 hours PRN Mild Pain (1 - 3), Moderate Pain (4 - 6)  haloperidol     Tablet 5 milliGRAM(s) Oral every 8 hours PRN agitation  LORazepam     Tablet 2 milliGRAM(s) Oral every 8 hours PRN Aggression  nicotine  Polacrilex Gum 2 milliGRAM(s) Oral every 2 hours PRN withdrawals  polyethylene glycol 3350 17 Gram(s) Oral daily PRN Constipation

## 2023-12-08 NOTE — BH INPATIENT PSYCHIATRY PROGRESS NOTE - NSBHASSESSSUMMFT_PSY_ALL_CORE
The patient is a 58-year-old male; ; domiciled alone; has 2 adult daughters; on disability/pension >10 years for injury while at work; self-reported PPHx of depression, denies prior ED visits or admissions, hx of SA; PMHx of renal cancer, back pain; BIB EMS; psychiatry consulted for SI.  Pt with worsening depression and decline in functioning over the past year, currently with SI and admitted for the same.     Patient seen and evaluated. As per nursing report no acute events. On approach patient calm and cooperative. No agitation or aggression noted. Patient continues to request any services he feels he can get. Patient did not follow up with medical issues for the past year now requesting multiple things. Patient has complaints of being on a DASH/TLC diet yesterday and asked writer to add fruit to his meals. Writer asked patient about constipation and offered Miralax. Patient declined. Now today requesting Miralax for constipation. Patient verbalizes an improved mood. Prozac recently increased. Will continue to monitor and titrate accordingly. Patient does not appear depressed. Frequently seen in the day room and in the halls laughing, talking, and engaging with peers. Writer verbalized patient making progress and brought up discharge. Every time discharge is discussed patient states he is not ready and brings up needing to get his place cleaned out. Continue to decline PHP. Patient denies any suicidal/homicidal ideations. Denies any A/V hallucinations. Patient visible on the unit engaging with peers and attending groups.    PT recommended a walker due to bilateral neuropathy. Patient ambulates without a walker.    Per Hematology:  # Acute LLE DVT with H/O DVT on Xarelto   - B/L LE venous duplex doppler positive thrombus of the left popliteal vein   - per chart review in 2021patient was started on XARELTO but has patient has been off AC - cannot recall last dose; possibly after last refill when initially prescribed   - Recommend to continue XARELTO; due to poor history of compliance with XARELTO at this time it is unclear if acute dvt is due to AC failure or underlying coagulation disorder   - Brief Pharmacokinetic of XARELTO reviewed and compliance once discharged stressed to patient   - f/u as outpt at [facility] with Dr. 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 Treviño    #Admit    #MDD  -Prozac 20mg daily--> 40mg daily 12/6/23  -Trazodone 100mg for insomnia    -Haldol 5mg Q8 PRN for agitation  -Lorazepam 2mg Q8 PRN for aggression    ##Tobacco use disorder  -Nicotine Gum PRN    -Pantoprazole  -Gabapentin  -Tylenol PRN The patient is a 58-year-old male; ; domiciled alone; has 2 adult daughters; on disability/pension >10 years for injury while at work; self-reported PPHx of depression, denies prior ED visits or admissions, hx of SA; PMHx of renal cancer, back pain; BIB EMS; psychiatry consulted for SI.  Pt with worsening depression and decline in functioning over the past year, currently with SI and admitted for the same.     Patient seen and evaluated. As per nursing report no acute events. On approach patient calm and cooperative. No agitation or aggression noted. Patient continues to request any services he feels he can get. Patient did not follow up with medical issues for the past year now requesting multiple things. Patient has complaints of being on a DASH/TLC diet yesterday and asked writer to add fruit to his meals. Writer asked patient about constipation and offered Miralax. Patient declined. Now today requesting Miralax for constipation. Patient verbalizes an improved mood. Prozac recently increased. Will continue to monitor and titrate accordingly. Patient does not appear depressed. Frequently seen in the day room and in the halls laughing, talking, and engaging with peers. Writer verbalized patient making progress and brought up discharge. Every time discharge is discussed patient states he is not ready and brings up needing to get his place cleaned out. Continue to decline PHP. Patient denies any suicidal/homicidal ideations. Denies any A/V hallucinations. Patient visible on the unit engaging with peers and attending groups.    PT recommended a walker due to bilateral neuropathy. Patient ambulates without a walker.    Per Hematology:  # Acute LLE DVT with H/O DVT on Xarelto   - B/L LE venous duplex doppler positive thrombus of the left popliteal vein   - per chart review in 2021patient was started on XARELTO but has patient has been off AC - cannot recall last dose; possibly after last refill when initially prescribed   - Recommend to continue XARELTO; due to poor history of compliance with XARELTO at this time it is unclear if acute dvt is due to AC failure or underlying coagulation disorder   - Brief Pharmacokinetic of XARELTO reviewed and compliance once discharged stressed to patient   - f/u as outpt at [facility] with Dr. 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 Treviño    #Admit    #MDD  -Prozac 20mg daily--> 40mg daily 12/6/23  -Trazodone 100mg for insomnia    -Haldol 5mg Q8 PRN for agitation  -Lorazepam 2mg Q8 PRN for aggression    ##Tobacco use disorder  -Nicotine Gum PRN    -Pantoprazole  -Gabapentin  -Tylenol PRN

## 2023-12-08 NOTE — BH INPATIENT PSYCHIATRY PROGRESS NOTE - NSBHFUPINTERVALHXFT_PSY_A_CORE
Patient seen and evaluated. As per nursing report no acute events. On approach patient calm and cooperative. No agitation or aggression noted. Patient continues to request any services he feels he can get. Patient did not follow up with medical issues for the past year now requesting multiple things. Patient has complaints of being on a DASH/TLC diet yesterday and asked writer to add fruit to his meals. Writer asked patient about constipation and offered Miralax. Patient declined. Now today requesting Miralax for constipation. Patient verbalizes an improved mood. Prozac recently increased. Will continue to monitor and titrate accordingly. Patient does not appear depressed. Frequently seen in the day room and in the halls laughing, talking, and engaging with peers. Writer verbalized patient making progress and brought up discharge. Every time discharge is discussed patient states he is not ready and brings up needing to get his place cleaned out. Continue to decline PHP. Patient denies any suicidal/homicidal ideations. Denies any A/V hallucinations. Patient visible on the unit engaging with peers and attending groups.    PT recommended a walker due to bilateral neuropathy. Patient ambulates without a walker.    Per Hematology:  # Acute LLE DVT with H/O DVT on Xarelto   - B/L LE venous duplex doppler positive thrombus of the left popliteal vein   - per chart review in 2021patient was started on XARELTO but has patient has been off AC - cannot recall last dose; possibly after last refill when initially prescribed   - Recommend to continue XARELTO; due to poor history of compliance with XARELTO at this time it is unclear if acute dvt is due to AC failure or underlying coagulation disorder   - Brief Pharmacokinetic of XARELTO reviewed and compliance once discharged stressed to patient   - f/u as outpt at [facility] with Dr. 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 Treviño     Patient seen and evaluated. As per nursing report no acute events. On approach patient calm and cooperative. No agitation or aggression noted. Patient continues to request any services he feels he can get. Patient did not follow up with medical issues for the past year now requesting multiple things. Patient has complaints of being on a DASH/TLC diet yesterday and asked writer to add fruit to his meals. Writer asked patient about constipation and offered Miralax. Patient declined. Now today requesting Miralax for constipation. Patient verbalizes an improved mood. Prozac recently increased. Will continue to monitor and titrate accordingly. Patient does not appear depressed. Frequently seen in the day room and in the halls laughing, talking, and engaging with peers. Writer verbalized patient making progress and brought up discharge. Every time discharge is discussed patient states he is not ready and brings up needing to get his place cleaned out. Continue to decline PHP. Patient denies any suicidal/homicidal ideations. Denies any A/V hallucinations. Patient visible on the unit engaging with peers and attending groups.    PT recommended a walker due to bilateral neuropathy. Patient ambulates without a walker.    Per Hematology:  # Acute LLE DVT with H/O DVT on Xarelto   - B/L LE venous duplex doppler positive thrombus of the left popliteal vein   - per chart review in 2021patient was started on XARELTO but has patient has been off AC - cannot recall last dose; possibly after last refill when initially prescribed   - Recommend to continue XARELTO; due to poor history of compliance with XARELTO at this time it is unclear if acute dvt is due to AC failure or underlying coagulation disorder   - Brief Pharmacokinetic of XARELTO reviewed and compliance once discharged stressed to patient   - f/u as outpt at [facility] with Dr. 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 Treviño

## 2023-12-08 NOTE — BH INPATIENT PSYCHIATRY PROGRESS NOTE - NSBHCHARTREVIEWVS_PSY_A_CORE FT
Vital Signs Last 24 Hrs  T(C): 35.9 (12-07-23 @ 15:12), Max: 35.9 (12-07-23 @ 15:12)  T(F): 96.6 (12-07-23 @ 15:12), Max: 96.6 (12-07-23 @ 15:12)  HR: 78 (12-07-23 @ 15:12) (78 - 78)  BP: 119/57 (12-07-23 @ 15:12) (119/57 - 119/57)  BP(mean): --  RR: 18 (12-07-23 @ 15:12) (18 - 18)  SpO2: --

## 2023-12-09 RX ORDER — LIDOCAINE 4 G/100G
10 CREAM TOPICAL EVERY 8 HOURS
Refills: 0 | Status: DISCONTINUED | OUTPATIENT
Start: 2023-12-09 | End: 2023-12-15

## 2023-12-09 RX ADMIN — Medication 100 MILLIGRAM(S): at 20:22

## 2023-12-09 RX ADMIN — Medication 40 MILLIGRAM(S): at 08:02

## 2023-12-09 RX ADMIN — Medication 650 MILLIGRAM(S): at 16:11

## 2023-12-09 RX ADMIN — PANTOPRAZOLE SODIUM 40 MILLIGRAM(S): 20 TABLET, DELAYED RELEASE ORAL at 06:51

## 2023-12-09 RX ADMIN — POLYETHYLENE GLYCOL 3350 17 GRAM(S): 17 POWDER, FOR SOLUTION ORAL at 12:45

## 2023-12-09 RX ADMIN — GABAPENTIN 800 MILLIGRAM(S): 400 CAPSULE ORAL at 20:22

## 2023-12-09 RX ADMIN — RIVAROXABAN 15 MILLIGRAM(S): KIT at 08:01

## 2023-12-09 RX ADMIN — RIVAROXABAN 15 MILLIGRAM(S): KIT at 16:08

## 2023-12-09 RX ADMIN — LIDOCAINE 10 MILLILITER(S): 4 CREAM TOPICAL at 12:08

## 2023-12-09 RX ADMIN — Medication 650 MILLIGRAM(S): at 17:27

## 2023-12-09 RX ADMIN — GABAPENTIN 800 MILLIGRAM(S): 400 CAPSULE ORAL at 08:02

## 2023-12-10 RX ORDER — SENNA PLUS 8.6 MG/1
2 TABLET ORAL AT BEDTIME
Refills: 0 | Status: DISCONTINUED | OUTPATIENT
Start: 2023-12-10 | End: 2023-12-15

## 2023-12-10 RX ADMIN — GABAPENTIN 800 MILLIGRAM(S): 400 CAPSULE ORAL at 08:09

## 2023-12-10 RX ADMIN — SENNA PLUS 2 TABLET(S): 8.6 TABLET ORAL at 18:40

## 2023-12-10 RX ADMIN — POLYETHYLENE GLYCOL 3350 17 GRAM(S): 17 POWDER, FOR SOLUTION ORAL at 08:12

## 2023-12-10 RX ADMIN — RIVAROXABAN 15 MILLIGRAM(S): KIT at 16:06

## 2023-12-10 RX ADMIN — GABAPENTIN 800 MILLIGRAM(S): 400 CAPSULE ORAL at 20:09

## 2023-12-10 RX ADMIN — Medication 650 MILLIGRAM(S): at 18:10

## 2023-12-10 RX ADMIN — PANTOPRAZOLE SODIUM 40 MILLIGRAM(S): 20 TABLET, DELAYED RELEASE ORAL at 06:39

## 2023-12-10 RX ADMIN — Medication 40 MILLIGRAM(S): at 08:08

## 2023-12-10 RX ADMIN — Medication 100 MILLIGRAM(S): at 20:09

## 2023-12-10 RX ADMIN — Medication 650 MILLIGRAM(S): at 17:11

## 2023-12-10 RX ADMIN — RIVAROXABAN 15 MILLIGRAM(S): KIT at 08:08

## 2023-12-10 NOTE — CHART NOTE - NSCHARTNOTEFT_GEN_A_CORE
Pt c/o b/l le leg edema, which his chronic but reports is worse since admission.  Pt reports being on Lasix in the past. Pt denies hx of renal failure or CHF.  Pt restarted on Xarelto for acute left popliteal DVT, hx of dvt in 2020.  On exam b/l pedal and ankle edema seen. no cyanosis, no erythema  Recommend leg elevation which pt reports has not been helping the problem.  Can consult medical attending for further evaluation, d/w [facility] team.

## 2023-12-11 PROCEDURE — 99232 SBSQ HOSP IP/OBS MODERATE 35: CPT

## 2023-12-11 RX ADMIN — GABAPENTIN 800 MILLIGRAM(S): 400 CAPSULE ORAL at 20:06

## 2023-12-11 RX ADMIN — Medication 100 MILLIGRAM(S): at 20:06

## 2023-12-11 RX ADMIN — RIVAROXABAN 15 MILLIGRAM(S): KIT at 18:06

## 2023-12-11 RX ADMIN — GABAPENTIN 800 MILLIGRAM(S): 400 CAPSULE ORAL at 08:23

## 2023-12-11 RX ADMIN — Medication 40 MILLIGRAM(S): at 08:23

## 2023-12-11 RX ADMIN — RIVAROXABAN 15 MILLIGRAM(S): KIT at 08:22

## 2023-12-11 RX ADMIN — PANTOPRAZOLE SODIUM 40 MILLIGRAM(S): 20 TABLET, DELAYED RELEASE ORAL at 06:32

## 2023-12-11 RX ADMIN — Medication 650 MILLIGRAM(S): at 10:49

## 2023-12-11 NOTE — BH INPATIENT PSYCHIATRY PROGRESS NOTE - NSBHATTESTBILLING_PSY_A_CORE
[id number] OBS or IP - moderate complexity OR 35-49 mins [id number]-Prhzujlqsg OBS or IP - moderate complexity OR 35-49 mins

## 2023-12-11 NOTE — BH INPATIENT PSYCHIATRY PROGRESS NOTE - NSBHCHARTREVIEWVS_PSY_A_CORE FT
Vital Signs Last 24 Hrs  T(C): 35.6 (12-10-23 @ 15:50), Max: 35.6 (12-10-23 @ 15:50)  T(F): 96.1 (12-10-23 @ 15:50), Max: 96.1 (12-10-23 @ 15:50)  HR: 75 (12-11-23 @ 08:33) (70 - 75)  BP: 125/55 (12-11-23 @ 08:33) (106/66 - 125/55)  BP(mean): --  RR: 16 (12-11-23 @ 08:33) (16 - 18)  SpO2: --

## 2023-12-11 NOTE — BH TREATMENT PLAN - NSDCCRITERIA_PSY_ALL_CORE
Patient will begin treatment and have resolution of SI and improvement of depressive sx
Patient will begin treatment and have resolution of SI and improvement of depressive sx

## 2023-12-11 NOTE — BH TREATMENT PLAN - NSTXDEPRESINTERRN_PSY_ALL_CORE
RN to encourage verbalization of feelings.  RN to encourage medication compliance and provide support and education as needed on Dx, coping skills, medication, and safety planning.  RN to encourage daily ADL's  RN to encourage group attendance  RN to assess and intervene for any depressive behaviors
RN to encourage verbalization of feelings.  RN to encourage medication compliance and provide support and education as needed on Dx, coping skills, medication, and safety planning.  RN to encourage daily ADL's  RN to encourage group attendance  RN to assess and intervene for any coping needs of patient

## 2023-12-11 NOTE — BH INPATIENT PSYCHIATRY PROGRESS NOTE - NSBHASSESSSUMMFT_PSY_ALL_CORE
The patient is a 58-year-old male; ; domiciled alone; has 2 adult daughters; on disability/pension >10 years for injury while at work; self-reported PPHx of depression, denies prior ED visits or admissions, hx of SA; PMHx of renal cancer, back pain; BIB EMS; psychiatry consulted for SI.  Pt with worsening depression and decline in functioning over the past year, currently with SI and admitted for the same.     Patient seen and evaluated. As per nursing report no acute events. On approach patient calm and cooperative. No agitation or aggression noted. Patient continues to request any services he feels he can get. Patient did not follow up with medical issues for the past year now requesting multiple things. Now stating that his legs are swollen and wants to be seen by a medical doctor again. Patient saw hospitalist on admission. Patient seen by PA and was told to elevate his legs which patient states does not work. Per staff and what writer has seen patient had not been elevating his legs like he should. Patient states he was on Lasix in the past but denies CHF. Patient continues to verbalize an improved mood. Prozac recently increased. Will continue to monitor and titrate accordingly. Patient does not appear depressed or anxious. Frequently seen in the day room and in the halls laughing, talking, making jokes and engaging with peers. Writer verbalized patient making progress and brought up discharge later this week. Patient then stated his home may not be ready by then. Every time discharge is discussed patient states he is not ready and brings up needing to get his place cleaned out. Continues to decline PHP. Patient denies any suicidal/homicidal ideations. Denies any A/V hallucinations. Patient visible on the unit engaging with peers and attending groups.    PT recommended a walker due to bilateral neuropathy. Patient ambulates without a walker.    Per PA:  Pt c/o b/l le leg edema, which his chronic but reports is worse since admission.  Pt reports being on Lasix in the past. Pt denies hx of renal failure or CHF.  Pt restarted on Xarelto for acute left popliteal DVT, hx of dvt in 2020.  On exam b/l pedal and ankle edema seen. no cyanosis, no erythema  Recommend leg elevation which pt reports has not been helping the problem.  Can consult medical attending for further evaluation, d/w K[facility]in team.    Per Hematology:  # Acute LLE DVT with H/O DVT on Xarelto   - B/L LE venous duplex doppler positive thrombus of the left popliteal vein   - per chart review in 2021patient was started on XARELTO but has patient has been off AC - cannot recall last dose; possibly after last refill when initially prescribed   - Recommend to continue XARELTO; due to poor history of compliance with XARELTO at this time it is unclear if acute dvt is due to AC failure or underlying coagulation disorder   - Brief Pharmacokinetic of XARELTO reviewed and compliance once discharged stressed to patient   - f/u as outpt at [facility] with Dr. 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 Treviño    #Admit    #MDD  -Prozac 20mg daily--> 40mg daily 12/6/23  -Trazodone 100mg for insomnia    -Haldol 5mg Q8 PRN for agitation  -Lorazepam 2mg Q8 PRN for aggression    ##Tobacco use disorder  -Nicotine Gum PRN    -Pantoprazole  -Gabapentin  -Tylenol PRN The patient is a 58-year-old male; ; domiciled alone; has 2 adult daughters; on disability/pension >10 years for injury while at work; self-reported PPHx of depression, denies prior ED visits or admissions, hx of SA; PMHx of renal cancer, back pain; BIB EMS; psychiatry consulted for SI.  Pt with worsening depression and decline in functioning over the past year, currently with SI and admitted for the same.     Patient seen and evaluated. As per nursing report no acute events. On approach patient calm and cooperative. No agitation or aggression noted. Patient continues to request any services he feels he can get. Patient did not follow up with medical issues for the past year now requesting multiple things. Now stating that his legs are swollen and wants to be seen by a medical doctor again. Patient saw hospitalist on admission. Patient seen by PA and was told to elevate his legs which patient states does not work. Per staff and what writer has seen patient had not been elevating his legs like he should. Patient states he was on Lasix in the past but denies CHF. Patient continues to verbalize an improved mood. Prozac recently increased. Will continue to monitor and titrate accordingly. Patient does not appear depressed or anxious. Frequently seen in the day room and in the halls laughing, talking, making jokes and engaging with peers. Writer verbalized patient making progress and brought up discharge later this week. Patient then stated his home may not be ready by then. Every time discharge is discussed patient states he is not ready and brings up needing to get his place cleaned out. Continues to decline PHP. Patient denies any suicidal/homicidal ideations. Denies any A/V hallucinations. Patient visible on the unit engaging with peers and attending groups.    PT recommended a walker due to bilateral neuropathy. Patient ambulates without a walker.    Per PA:  Pt c/o b/l le leg edema, which his chronic but reports is worse since admission.  Pt reports being on Lasix in the past. Pt denies hx of renal failure or CHF.  Pt restarted on Xarelto for acute left popliteal DVT, hx of dvt in 2020.  On exam b/l pedal and ankle edema seen. no cyanosis, no erythema  Recommend leg elevation which pt reports has not been helping the problem.  Can consult medical attending for further evaluation, d/w [facility] team.    Per Hematology:  # Acute LLE DVT with H/O DVT on Xarelto   - B/L LE venous duplex doppler positive thrombus of the left popliteal vein   - per chart review in 2021patient was started on XARELTO but has patient has been off AC - cannot recall last dose; possibly after last refill when initially prescribed   - Recommend to continue XARELTO; due to poor history of compliance with XARELTO at this time it is unclear if acute dvt is due to AC failure or underlying coagulation disorder   - Brief Pharmacokinetic of XARELTO reviewed and compliance once discharged stressed to patient   - f/u as outpt at [facility] with Dr. 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 Treviño    #Admit    #MDD  -Prozac 20mg daily--> 40mg daily 12/6/23  -Trazodone 100mg for insomnia    -Haldol 5mg Q8 PRN for agitation  -Lorazepam 2mg Q8 PRN for aggression    ##Tobacco use disorder  -Nicotine Gum PRN    -Pantoprazole  -Gabapentin  -Tylenol PRN

## 2023-12-11 NOTE — BH TREATMENT PLAN - NSTXSUICIDGOAL_PSY_ALL_CORE
Be able to report that they independently used a coping skill instead of hurting oneself
Be able to state 3 reasons for living

## 2023-12-11 NOTE — BH INPATIENT PSYCHIATRY PROGRESS NOTE - NSBHMETABOLIC_PSY_ALL_CORE_FT
BMI: BMI (kg/m2): 38.9 (12-05-23 @ 11:48)  HbA1c: A1C with Estimated Average Glucose Result: 5.2 % (12-03-23 @ 08:35)    Glucose:   BP: 125/55 (12-11-23 @ 08:33) (101/57 - 133/67)Vital Signs Last 24 Hrs  T(C): 35.6 (12-10-23 @ 15:50), Max: 35.6 (12-10-23 @ 15:50)  T(F): 96.1 (12-10-23 @ 15:50), Max: 96.1 (12-10-23 @ 15:50)  HR: 75 (12-11-23 @ 08:33) (70 - 75)  BP: 125/55 (12-11-23 @ 08:33) (106/66 - 125/55)  BP(mean): --  RR: 16 (12-11-23 @ 08:33) (16 - 18)  SpO2: --      Lipid Panel: Date/Time: 12-03-23 @ 08:35  Cholesterol, Serum: 167  LDL Cholesterol Calculated: 115  HDL Cholesterol, Serum: 36  Total Cholesterol/HDL Ration Measurement: --  Triglycerides, Serum: 79

## 2023-12-11 NOTE — BH INPATIENT PSYCHIATRY PROGRESS NOTE - CURRENT MEDICATION
MEDICATIONS  (STANDING):  FLUoxetine 40 milliGRAM(s) Oral daily  gabapentin 800 milliGRAM(s) Oral two times a day  influenza   Vaccine 0.5 milliLiter(s) IntraMuscular once  pantoprazole    Tablet 40 milliGRAM(s) Oral before breakfast  rivaroxaban 15 milliGRAM(s) Oral two times a day with meals  traZODone 100 milliGRAM(s) Oral at bedtime    MEDICATIONS  (PRN):  acetaminophen     Tablet .. 650 milliGRAM(s) Oral every 6 hours PRN Mild Pain (1 - 3), Moderate Pain (4 - 6)  haloperidol     Tablet 5 milliGRAM(s) Oral every 8 hours PRN agitation  lidocaine 2% Viscous 10 milliLiter(s) Swish and Spit every 8 hours PRN toothache  LORazepam     Tablet 2 milliGRAM(s) Oral every 8 hours PRN Aggression  nicotine  Polacrilex Gum 2 milliGRAM(s) Oral every 2 hours PRN withdrawals  polyethylene glycol 3350 17 Gram(s) Oral daily PRN Constipation  senna 2 Tablet(s) Oral at bedtime PRN constipation

## 2023-12-11 NOTE — CONSULT NOTE ADULT - SUBJECTIVE AND OBJECTIVE BOX
Received consult for this patient 7 hours after it was placed, I'm not aware of any doctor to doctor contact which could have expedited consultation.  At this time I do not have availability of resources nor ability to discuss relevant issues with other doctors involved in this patient's care in order to place a meaningful consult. Consequently since problem is primarily chronic, advise  to place new consult during daytime hours when routine consults are generally done  Received consult for this patient 7 hours after it was placed and more then a day after PA recommended a medical consult, I'm not aware of any doctor to doctor contact which could have expedited consultation.  At this time I do not have availability of resources nor ability to discuss relevant issues with other doctors involved in this patient's care in order to place a meaningful consult. I did interview Mr French at bedside. Started on DOAC for DVT and he denies kidney problems or CHF. Moderate swelling to both legs present associated with numbness on right and "pins and needles" to left which is actually improving on current meds. Consequently since problem is primarily chronic, advise  to place new consult during daytime hours when routine consults are generally done. However in addition to leg elevation as recommended by medical PA on the 10th, I don't see a problem applying compression stockings to leg without thrombus and short empiric course of low dose diuretic  Received consult for this patient 7 hours after it was placed and more then a day after PA recommended a medical consult, I'm not aware of any doctor to doctor contact which could have expedited consultation.  At this time I do not have availability of resources nor ability to discuss relevant issues with other doctors involved in this patient's care in order to place a meaningful consult. I did interview Mr French at bedside. Recently started on DOAC for DVT and he denies kidney problems or CHF. Moderate swelling to both legs present associated with numbness on right and "pins and needles" to left which is actually improving on current meds. Consequently since problem is primarily chronic, advise  to place new consult during daytime hours when routine consults are generally done. However in addition to leg elevation as recommended by medical PA on the 10th, I don't see a problem applying compression stockings to leg without thrombus and short empiric course of low dose diuretic  Received consult for this patient 7 hours after it was placed and more then a day after PA recommended a medical consult, I'm not aware of any doctor to doctor contact which could have expedited consultation.  At this time I do not have availability of resources nor ability to discuss relevant issues with other doctors involved in this patient's care in order to place a meaningful consult. I did interview Mr French at bedside. Recently started on DOAC for DVT and he denies kidney problems or CHF. Moderate swelling to both legs present associated with numbness on right and "pins and needles" to left which is actually improving on current meds. He does see Dr Laguna (internal medicine) and Dr Treviño (neurology) as out patient. Consequently since problem is primarily chronic, advise  to place new consult during daytime hours when routine consults are generally done. However in addition to leg elevation as recommended by medical PA on the 10th, I don't see a problem applying compression stockings to leg without thrombus and short empiric course of low dose diuretic

## 2023-12-11 NOTE — BH TREATMENT PLAN - NSTXDEPRESINTERSW_PSY_ALL_CORE
Sw will provide support contacts education and referrals for healthy coping skills. Pt encouraged to attend at least 1 group per day.
Sw will provide support contacts education and referrals for healthy coping skills. Pt encouraged to attend at least 1 group per day.

## 2023-12-11 NOTE — BH INPATIENT PSYCHIATRY PROGRESS NOTE - NSBHFUPINTERVALHXFT_PSY_A_CORE
Patient seen and evaluated. As per nursing report no acute events. On approach patient calm and cooperative. No agitation or aggression noted. Patient continues to request any services he feels he can get. Patient did not follow up with medical issues for the past year now requesting multiple things. Now stating that his legs are swollen and wants to be seen by a medical doctor again. Patient saw hospitalist on admission. Patient seen by PA and was told to elevate his legs which patient states does not work. Per staff and what writer has seen patient had not been elevating his legs like he should. Patient states he was on Lasix in the past but denies CHF. Patient continues to verbalize an improved mood. Prozac recently increased. Will continue to monitor and titrate accordingly. Patient does not appear depressed or anxious. Frequently seen in the day room and in the halls laughing, talking, making jokes and engaging with peers. Writer verbalized patient making progress and brought up discharge later this week. Patient then stated his home may not be ready by then. Every time discharge is discussed patient states he is not ready and brings up needing to get his place cleaned out. Continues to decline PHP. Patient denies any suicidal/homicidal ideations. Denies any A/V hallucinations. Patient visible on the unit engaging with peers and attending groups.    PT recommended a walker due to bilateral neuropathy. Patient ambulates without a walker.    Per PA:  Pt c/o b/l le leg edema, which his chronic but reports is worse since admission.  Pt reports being on Lasix in the past. Pt denies hx of renal failure or CHF.  Pt restarted on Xarelto for acute left popliteal DVT, hx of dvt in 2020.  On exam b/l pedal and ankle edema seen. no cyanosis, no erythema  Recommend leg elevation which pt reports has not been helping the problem.  Can consult medical attending for further evaluation, d/w Katherin team.    Per Hematology:  # Acute LLE DVT with H/O DVT on Xarelto   - B/L LE venous duplex doppler positive thrombus of the left popliteal vein   - per chart review in 2021patient was started on XARELTO but has patient has been off AC - cannot recall last dose; possibly after last refill when initially prescribed   - Recommend to continue XARELTO; due to poor history of compliance with XARELTO at this time it is unclear if acute dvt is due to AC failure or underlying coagulation disorder   - Brief Pharmacokinetic of XARELTO reviewed and compliance once discharged stressed to patient   - f/u as outpt at [facility] with Dr. 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 Treviño     Patient seen and evaluated. As per nursing report no acute events. On approach patient calm and cooperative. No agitation or aggression noted. Patient continues to request any services he feels he can get. Patient did not follow up with medical issues for the past year now requesting multiple things. Now stating that his legs are swollen and wants to be seen by a medical doctor again. Patient saw hospitalist on admission. Patient seen by PA and was told to elevate his legs which patient states does not work. Per staff and what writer has seen patient had not been elevating his legs like he should. Patient states he was on Lasix in the past but denies CHF. Patient continues to verbalize an improved mood. Prozac recently increased. Will continue to monitor and titrate accordingly. Patient does not appear depressed or anxious. Frequently seen in the day room and in the halls laughing, talking, making jokes and engaging with peers. Writer verbalized patient making progress and brought up discharge later this week. Patient then stated his home may not be ready by then. Every time discharge is discussed patient states he is not ready and brings up needing to get his place cleaned out. Continues to decline PHP. Patient denies any suicidal/homicidal ideations. Denies any A/V hallucinations. Patient visible on the unit engaging with peers and attending groups.    PT recommended a walker due to bilateral neuropathy. Patient ambulates without a walker.    Per PA:  Pt c/o b/l le leg edema, which his chronic but reports is worse since admission.  Pt reports being on Lasix in the past. Pt denies hx of renal failure or CHF.  Pt restarted on Xarelto for acute left popliteal DVT, hx of dvt in 2020.  On exam b/l pedal and ankle edema seen. no cyanosis, no erythema  Recommend leg elevation which pt reports has not been helping the problem.  Can consult medical attending for further evaluation, d/w Katherin team.    Per Hematology:  # Acute LLE DVT with H/O DVT on Xarelto   - B/L LE venous duplex doppler positive thrombus of the left popliteal vein   - per chart review in 2021patient was started on XARELTO but has patient has been off AC - cannot recall last dose; possibly after last refill when initially prescribed   - Recommend to continue XARELTO; due to poor history of compliance with XARELTO at this time it is unclear if acute dvt is due to AC failure or underlying coagulation disorder   - Brief Pharmacokinetic of XARELTO reviewed and compliance once discharged stressed to patient   - f/u as outpt at [facility] with Dr. 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 Treviño

## 2023-12-11 NOTE — BH TREATMENT PLAN - NSTXSUICIDINTERRN_PSY_ALL_CORE
RN to assess patients behavior and be alert for increased signs of worsening mood, impulsivity and agitation.  RN will monitor patient and provide support and comfort and provide safety on unit.   RN to encourage medication compliance.  Provide support and education as needed on Dx, coping skills, medication, and safety planning.  RN to Encourage daily ADL's.   RN to Encourage group attendance  RN will assess and intervene for any suicidal ideations.   Safety planning
RN to assess patients behavior and be alert for increased signs of impulsivity/agitation.  RN will monitor patient and provide support and comfort and provedie safety on unit.    RN to encourage medication compliance and provide support and education as needed on Dx, coping skills, medication, and safety planning.  RN to Encourage daily ADL's  RN to Encourage group attendance  RN will assess and intervene for any suicidal ideations

## 2023-12-11 NOTE — BH TREATMENT PLAN - NSTXPLANTHERAPYSESSIONSFT_PSY_ALL_CORE
12-04-23  Type of therapy: Coping skills, Self esteem, Social skills training, Stress management, Symptom management  Type of session: Group  Level of patient participation: Participated with encouragement  Duration of participation: 30 minutes  Therapy conducted by: Psych rehab  Therapy Summary: Pt needs increased encouragement .    12-05-23  Type of therapy: Coping skills  Type of session: Group  Level of patient participation: Attentive, Engaged, Participates  Duration of participation: 30 minutes  Therapy conducted by: Nursing  --    12-06-23  Type of therapy: Cognitive behavior therapy  Type of session: Group  Level of patient participation: Engaged, Participates  Duration of participation: 45 minutes  Therapy conducted by: Social work  Therapy Summary: SWI facilitated a CBT focuses on understanding the central ideas that underlie thoughts and behaviors. Core beliefs, which are central ideas about oneself, others, and the world, are crucial in guiding thoughts and behaviors, influencing life experiences.   Pt took part by expressing some of his core thoughts. Pt interacted with group members and showed interest in picking up terms related to basic beliefs.    12-06-23  Type of therapy: Coping skills, Psychoeducation  Type of session: Group  Level of patient participation: Engaged, Participates  Duration of participation: 45 minutes  Therapy conducted by: Social work  Therapy Summary: Coping skills are essential techniques for managing depression symptoms. These techniques, such as behavioral activation, social support, positive journaling, and mindfulness, can help alleviate negative thoughts and improve mood. Practicing these techniques during sessions and creating a home practice plan can further enhance overall well-being.   The patient was engaging and acknowledged that he needed to change a few things in his life. The patient stated he lacks a support system and will attend bingo nights in his neighborhood and sign up for a support group to build a better support system in his life.    12-07-23  Type of therapy: Coping skills  Type of session: Group  Level of patient participation: Attentive, Engaged, Participates  Duration of participation: 45 minutes  Therapy conducted by: Laz (senia)LUZ  --    12-07-23  Type of therapy: Cognitive behavior therapy, Coping skills  Type of session: Group  Level of patient participation: Engaged, Participates  Duration of participation: 45 minutes  Therapy conducted by: Social work  Therapy Summary: Cognitive restructuring is a process that helps individuals identify and challenge cognitive distortions, which are irrational thoughts that can impact their perception, feelings, and actions. By minimizing or avoiding these distortions, clients can maintain a balanced perspective, lower anxiety, and feel better about themselves.   The patient interacts with other group members, shares various cognitive distortions, and is prepared to make the necessary adjustments when discharged .    12-07-23  Type of therapy: Educational/vocational  Type of session: Group  Level of patient participation: Engaged, Participates  Duration of participation: 45 minutes  Therapy conducted by: Social work  Therapy Summary: SWI facilitated healthy stress management tips, as they help maintain a healthy mindset, promote a balanced lifestyle, and foster the use of effective coping skills.   Patient was engaging and receptive to therapeutic approaches. Pt is open to learning new stress management technics.    12-08-23  Type of therapy: Coping skills  Type of session: Group  Level of patient participation: Attentive, Engaged, Participates  Duration of participation: 30 minutes  Therapy conducted by: Nursing  --    12-11-23  Type of therapy: Coping skills, Inspiration and motiviation, Leisure development, Social skills training, Stress management, Symptom management  Type of session: Group  Level of patient participation: Participated with encouragement  Duration of participation: 45 minutes  Therapy conducted by: Psych rehab  Therapy Summary: Pt is attending select groups , pt enjoys playing card , during sessions . Pt also spends time viewing TV in the dayroom . Mood is improving .

## 2023-12-12 PROCEDURE — 99232 SBSQ HOSP IP/OBS MODERATE 35: CPT

## 2023-12-12 RX ADMIN — RIVAROXABAN 15 MILLIGRAM(S): KIT at 17:14

## 2023-12-12 RX ADMIN — GABAPENTIN 800 MILLIGRAM(S): 400 CAPSULE ORAL at 08:30

## 2023-12-12 RX ADMIN — INFLUENZA VIRUS VACCINE 0.5 MILLILITER(S): 15; 15; 15; 15 SUSPENSION INTRAMUSCULAR at 18:53

## 2023-12-12 RX ADMIN — PANTOPRAZOLE SODIUM 40 MILLIGRAM(S): 20 TABLET, DELAYED RELEASE ORAL at 06:33

## 2023-12-12 RX ADMIN — Medication 650 MILLIGRAM(S): at 08:38

## 2023-12-12 RX ADMIN — Medication 650 MILLIGRAM(S): at 06:34

## 2023-12-12 RX ADMIN — RIVAROXABAN 15 MILLIGRAM(S): KIT at 08:30

## 2023-12-12 RX ADMIN — Medication 650 MILLIGRAM(S): at 16:13

## 2023-12-12 RX ADMIN — Medication 40 MILLIGRAM(S): at 08:30

## 2023-12-12 RX ADMIN — POLYETHYLENE GLYCOL 3350 17 GRAM(S): 17 POWDER, FOR SOLUTION ORAL at 11:31

## 2023-12-12 RX ADMIN — Medication 650 MILLIGRAM(S): at 17:18

## 2023-12-12 RX ADMIN — Medication 100 MILLIGRAM(S): at 20:12

## 2023-12-12 RX ADMIN — GABAPENTIN 800 MILLIGRAM(S): 400 CAPSULE ORAL at 20:12

## 2023-12-12 NOTE — BH INPATIENT PSYCHIATRY PROGRESS NOTE - NSBHATTESTBILLING_PSY_A_CORE
71198-Rcaavtfgsk OBS or IP - moderate complexity OR 35-49 mins 47727-Uljwitaajd OBS or IP - moderate complexity OR 35-49 mins

## 2023-12-12 NOTE — BH DISCHARGE NOTE NURSING/SOCIAL WORK/PSYCH REHAB - NSTOBACCOSMKCESSPRO_PSY_ALL_CORE
.  [facility] Smoker's Quitline   [phone] ([phone]) .  [facility] Smoker's Quitline   [phone] ([phone])

## 2023-12-12 NOTE — BH DISCHARGE NOTE NURSING/SOCIAL WORK/PSYCH REHAB - NSDCPEHOTLINE_GEN_ALL_CORE
[facility] Smokers Quitline [phone] ([phone]) [facility] Smokers Quitline [phone] ([phone])

## 2023-12-12 NOTE — BH DISCHARGE NOTE NURSING/SOCIAL WORK/PSYCH REHAB - NSDCADDINFO1FT_PSY_ALL_CORE
IN PERSON appointment with Anna   *YOU MUST ATTEND THE INITIAL INTAKE APPOINTMENT IN ORDER TO SEE YOUR PSYCHIATRIST.    MISSED INTAKE WILL CANCEL PSYCHIATRIST'S APPT AUTOMATICALLY!*

## 2023-12-12 NOTE — BH INPATIENT PSYCHIATRY PROGRESS NOTE - NSBHASSESSSUMMFT_PSY_ALL_CORE
The patient is a 58-year-old male; ; domiciled alone; has 2 adult daughters; on disability/pension >10 years for injury while at work; self-reported PPHx of depression, denies prior ED visits or admissions, hx of SA; PMHx of renal cancer, back pain; BIB EMS; psychiatry consulted for SI.  Pt with worsening depression and decline in functioning over the past year, currently with SI and admitted for the same.     Patient seen and evaluated. As per nursing report no acute events. On approach patient calm and cooperative. No agitation or aggression noted. Patient continues to request any services he feels he can get. Patient did not follow up with medical issues for the past year now requesting multiple things. Now stating that his legs are swollen and wants to be seen by a medical doctor again. Patient saw hospitalist on admission. Patient seen by PA and was told to elevate his legs which patient states does not work. Per staff and what writer has seen patient had not been elevating his legs like he should. Patient states he was on Lasix in the past but denies CHF. New consult was placed and patient seen by hospitalist. Patient states has been elevating his legs and they are improving on current meds. Swelling is going down. Patient states he will follow up outpatient. Will continue to monitor and recall hospitalist if needed. Patient continues to verbalize an improved mood. Prozac recently increased. Will continue to monitor and titrate accordingly. Patient does not appear depressed or anxious. Frequently seen in the day room and in the halls laughing, talking, making jokes and engaging with peers. Patient states he is sleeping very well. Writer verbalized patient making progress and again brought up discharge later this week. Patient then stated his brother has scheduled someone to come to his home for clean up on Saturday. Every time discharge is discussed patient brings up needing to get his place cleaned out. Patient also asked team NOT to contact his ex wife anymore. Continues to decline PHP. Patient denies any suicidal/homicidal ideations. Denies any A/V hallucinations. Patient visible on the unit engaging with peers and attending groups. Anticipated discharge later in week provided patient continues to improve.    PT recommended a walker due to bilateral neuropathy. Patient ambulates without a walker.    Case discussed with attending psychiatrist Dr. Starks    #Admit    #MDD  -Prozac 20mg daily--> 40mg daily 12/6/23  -Trazodone 100mg for insomnia    -Haldol 5mg Q8 PRN for agitation  -Lorazepam 2mg Q8 PRN for aggression    ##Tobacco use disorder  -Nicotine Gum PRN    -Pantoprazole  -Gabapentin  -Tylenol PRN

## 2023-12-12 NOTE — BH INPATIENT PSYCHIATRY PROGRESS NOTE - NSBHMETABOLIC_PSY_ALL_CORE_FT
BMI: BMI (kg/m2): 38.9 (12-05-23 @ 11:48)  HbA1c: A1C with Estimated Average Glucose Result: 5.2 % (12-03-23 @ 08:35)    Glucose:   BP: 101/62 (12-12-23 @ 09:00) (101/62 - 126/61)Vital Signs Last 24 Hrs  T(C): 36.4 (12-12-23 @ 09:00), Max: 36.4 (12-12-23 @ 09:00)  T(F): 97.6 (12-12-23 @ 09:00), Max: 97.6 (12-12-23 @ 09:00)  HR: 75 (12-12-23 @ 09:00) (65 - 75)  BP: 101/62 (12-12-23 @ 09:00) (101/62 - 126/61)  BP(mean): --  RR: 18 (12-12-23 @ 09:00) (18 - 18)  SpO2: --      Lipid Panel: Date/Time: 12-03-23 @ 08:35  Cholesterol, Serum: 167  LDL Cholesterol Calculated: 115  HDL Cholesterol, Serum: 36  Total Cholesterol/HDL Ration Measurement: --  Triglycerides, Serum: 79

## 2023-12-12 NOTE — BH DISCHARGE NOTE NURSING/SOCIAL WORK/PSYCH REHAB - NSBHDCAGENCY1FT_PSY_A_CORE
[facility] Outpatient Mental Health Department [facility] Outpatient Mental Health Department

## 2023-12-12 NOTE — BH DISCHARGE NOTE NURSING/SOCIAL WORK/PSYCH REHAB - NSDCVIVACCINE_GEN_ALL_CORE_FT
No Vaccines Administered. influenza, injectable, quadrivalent, preservative free; 12-Dec-2023 18:53; Lily Hall (RN); [employer]; [id number] (Exp. Date: 11-Jun-2024); IntraMuscular; Deltoid Left.; 0.5 milliLiter(s); VIS (VIS Published: 06-Aug-2021, VIS Presented: 12-Dec-2023);    influenza, injectable, quadrivalent, preservative free; 12-Dec-2023 18:53; Lily Hall (RN); [employer]; [id number] (Exp. Date: 11-Jun-2024); IntraMuscular; Deltoid Left.; 0.5 milliLiter(s); VIS (VIS Published: 06-Aug-2021, VIS Presented: 12-Dec-2023);

## 2023-12-12 NOTE — BH INPATIENT PSYCHIATRY PROGRESS NOTE - NSBHCHARTREVIEWVS_PSY_A_CORE FT
Vital Signs Last 24 Hrs  T(C): 36.4 (12-12-23 @ 09:00), Max: 36.4 (12-12-23 @ 09:00)  T(F): 97.6 (12-12-23 @ 09:00), Max: 97.6 (12-12-23 @ 09:00)  HR: 75 (12-12-23 @ 09:00) (65 - 75)  BP: 101/62 (12-12-23 @ 09:00) (101/62 - 126/61)  BP(mean): --  RR: 18 (12-12-23 @ 09:00) (18 - 18)  SpO2: --

## 2023-12-12 NOTE — BH DISCHARGE NOTE NURSING/SOCIAL WORK/PSYCH REHAB - NSDCPEPAMP_GEN_ALL_CORE
“[facility] for Tobacco Control” pamphlet given “[facility] for Tobacco Control” pamphlet given

## 2023-12-12 NOTE — BH DISCHARGE NOTE NURSING/SOCIAL WORK/PSYCH REHAB - NSBHDCADDR1FT_A_CORE
[address]  [facility] [address] [address]  [facility] [address]

## 2023-12-12 NOTE — BH DISCHARGE NOTE NURSING/SOCIAL WORK/PSYCH REHAB - NSDCPETBCESMAN_GEN_ALL_CORE
detailed exam If you are a smoker, it is important for your health to stop smoking. Please be aware that second hand smoke is also harmful.

## 2023-12-12 NOTE — BH DISCHARGE NOTE NURSING/SOCIAL WORK/PSYCH REHAB - NSDCPEEMAIL_GEN_ALL_CORE
[facility] for Tobacco Control email [email or web address] for [email or web address] email [email or web address]

## 2023-12-12 NOTE — BH DISCHARGE NOTE NURSING/SOCIAL WORK/PSYCH REHAB - NSDCPEWEB_GEN_ALL_CORE
[facility] for Tobacco Control website --- [email or web address]/quitsmoking/[place] website --- [email or web address].[email or web address] Ce[email or web address] [facility] for Tobacco Control website --- [email or web address]://[facility]/quitsmoking/[place] website --- www.[facility] HospitalBest Apps Marketfrbalwinder.com

## 2023-12-12 NOTE — BH DISCHARGE NOTE NURSING/SOCIAL WORK/PSYCH REHAB - PATIENT PORTAL LINK FT
You can access the FollowMyHealth Patient Portal offered by [facility] by registering at the following website: [email or web address]. By joining [employer]’s FollowMyHealth portal, you will also be able to view your health information using other applications (apps) compatible with our system. You can access the FollowMyHealth Patient Portal offered by [facility] by registering at the following website: [email or web address]. By joining [employer]’s FollowMyHealth portal, you will also be able to view your health information using other applications (apps) compatible with our system.

## 2023-12-12 NOTE — BH DISCHARGE NOTE NURSING/SOCIAL WORK/PSYCH REHAB - NSCDUDCCRISIS_PSY_A_CORE
[facility]  1 [phone]) [facility] ([phone])  Text "WELL" to 56181  Website: www.[employer]Cool[employer]/.National Suicide Prevention Lifeline 9 [phone] [phone](316) 576-8181/988 Suicide and Crisis Lifeline Critical access hospital Well  1 (830) [facility] ([phone])  Text "WELL" to 55427  Website: www.[employer]/.National Suicide Prevention Lifeline 3 [phone](887) 811-2538/988 Suicide and Crisis Lifeline

## 2023-12-12 NOTE — BH DISCHARGE NOTE NURSING/SOCIAL WORK/PSYCH REHAB - NSBHDCAGENCY2FT_PSY_A_CORE
[facility] Outpatient Mental Health  [facility] Outpatient Mental Health

## 2023-12-13 PROCEDURE — 99232 SBSQ HOSP IP/OBS MODERATE 35: CPT

## 2023-12-13 RX ADMIN — Medication 100 MILLIGRAM(S): at 21:18

## 2023-12-13 RX ADMIN — GABAPENTIN 800 MILLIGRAM(S): 400 CAPSULE ORAL at 08:18

## 2023-12-13 RX ADMIN — PANTOPRAZOLE SODIUM 40 MILLIGRAM(S): 20 TABLET, DELAYED RELEASE ORAL at 06:36

## 2023-12-13 RX ADMIN — RIVAROXABAN 15 MILLIGRAM(S): KIT at 08:18

## 2023-12-13 RX ADMIN — Medication 40 MILLIGRAM(S): at 08:18

## 2023-12-13 RX ADMIN — RIVAROXABAN 15 MILLIGRAM(S): KIT at 17:57

## 2023-12-13 RX ADMIN — POLYETHYLENE GLYCOL 3350 17 GRAM(S): 17 POWDER, FOR SOLUTION ORAL at 08:22

## 2023-12-13 RX ADMIN — GABAPENTIN 800 MILLIGRAM(S): 400 CAPSULE ORAL at 21:18

## 2023-12-13 NOTE — BH INPATIENT PSYCHIATRY PROGRESS NOTE - CURRENT MEDICATION
MEDICATIONS  (STANDING):  FLUoxetine 40 milliGRAM(s) Oral daily  gabapentin 800 milliGRAM(s) Oral two times a day  pantoprazole    Tablet 40 milliGRAM(s) Oral before breakfast  rivaroxaban 15 milliGRAM(s) Oral two times a day with meals  traZODone 100 milliGRAM(s) Oral at bedtime    MEDICATIONS  (PRN):  acetaminophen     Tablet .. 650 milliGRAM(s) Oral every 6 hours PRN Mild Pain (1 - 3), Moderate Pain (4 - 6)  haloperidol     Tablet 5 milliGRAM(s) Oral every 8 hours PRN agitation  lidocaine 2% Viscous 10 milliLiter(s) Swish and Spit every 8 hours PRN toothache  LORazepam     Tablet 2 milliGRAM(s) Oral every 8 hours PRN Aggression  nicotine  Polacrilex Gum 2 milliGRAM(s) Oral every 2 hours PRN withdrawals  polyethylene glycol 3350 17 Gram(s) Oral daily PRN Constipation  senna 2 Tablet(s) Oral at bedtime PRN constipation

## 2023-12-13 NOTE — BH INPATIENT PSYCHIATRY DISCHARGE NOTE - HOSPITAL COURSE
The patient is a 58-year-old male; ; domiciled alone; has 2 adult daughters; on disability/pension >10 years for injury while at work; self-reported PPHx of depression, denies prior ED visits or admissions, hx of SA; PMHx of renal cancer, back pain; BIB EMS; psychiatry consulted for SI.  Pt states that he fell about 1 year ago and broke some teeth, has not left the house since then because he is afraid to go out.  He states he is "tired of living," feels he has "nothing in life" and doesn't expect this to change.  He reports depressed mood, sleep disturbance, sometimes feels tired, anhedonia, eats 1 meal/day (delivered to the apt).  Pt states that he "wants it to end" and has thought of killing himself recently by taking all the pills in his house.  He states he came close to acting on it but did not, noting that when he woke up a few days later he no longer had that thought.  He reports "feeling lost" and "doesn't want to go on."  He states that he needs help because he "can't function on his own" and feels there is "no way he is going to survive."  He states he has not been tending to his apt or ADLs.  He believes one of his disability/pension payments must have stopped because some bills haven't been paid, has not seen a doctor in over 1.5 years, has not been taking any medications for medical/psych/pain reasons.  He gives consent for [employer] to speak with his ex-wife and brothers (though doesn't have their numbers at this time).  He does not want his daughter contacted as he does not want to upset her.      Patient interviewed on unit:  Patient provides more but similar information as that provided in the ED. Today patient reports that he has a hx of depression for which he would previously follow with a psychiatrist. He reports that his provider relayed that the practice would close and the patient reports that thereafter the patient stopped following up and stopped taking medications. Patient reports that 1 yr ago he fell and knocked out some teeth which he reports lead him to stay indoors. The patient denied that he was self concious about his teeth situation and could not provide a reason as to why his loss of teeth lead him to be more insolated. He reports that as time passed he became more and more isolated and depressed and developed some thoughts about wanting to die with ideas of OD as he had one previous attempt. He reports that he was brought to the hospital as his ex-wife had sent a wellness check and he reports that he is hop The patient is a 58-year-old male; ; domiciled alone; has 2 adult daughters; on disability/pension >10 years for injury while at work; self-reported PPHx of depression, denies prior ED visits or admissions, hx of SA; PMHx of renal cancer, back pain; BIB EMS; psychiatry consulted for SI.  Pt states that he fell about 1 year ago and broke some teeth, has not left the house since then because he is afraid to go out.  He states he is "tired of living," feels he has "nothing in life" and doesn't expect this to change.  He reports depressed mood, sleep disturbance, sometimes feels tired, anhedonia, eats 1 meal/day (delivered to the apt).  Pt states that he "wants it to end" and has thought of killing himself recently by taking all the pills in his house.  He states he came close to acting on it but did not, noting that when he woke up a few days later he no longer had that thought.  He reports "feeling lost" and "doesn't want to go on."  He states that he needs help because he "can't function on his own" and feels there is "no way he is going to survive."  He states he has not been tending to his apt or ADLs.  He believes one of his disability/pension payments must have stopped because some bills haven't been paid, has not seen a doctor in over 1.5 years, has not been taking any medications for medical/psych/pain reasons.  He gives consent for [employer] to speak with his ex-wife and brothers (though doesn't have their numbers at this time).  He does not want his daughter contacted as he does not want to upset her.      Patient interviewed on unit:  Patient provides more but similar information as that provided in the ED. Today patient reports that he has a hx of depression for which he would previously follow with a psychiatrist. He reports that his provider relayed that the practice would close and the patient reports that thereafter the patient stopped following up and stopped taking medications. Patient reports that 1 yr ago he fell and knocked out some teeth which he reports lead him to stay indoors. The patient denied that he was self concious about his teeth situation and could not provide a reason as to why his loss of teeth lead him to be more insolated. He reports that as time passed he became more and more isolated and depressed and developed some thoughts about wanting to die with ideas of OD as he had one previous attempt. He reports that he was brought to the hospital as his ex-wife had sent a wellness check and he reports that he is hop The patient is a 58-year-old male; ; domiciled alone; has 2 adult daughters; on disability/pension >10 years for injury while at work; self-reported PPHx of depression, denies prior ED visits or admissions, hx of SA; PMHx of renal cancer, back pain; BIB EMS; psychiatry consulted for SI.  Pt states that he fell about 1 year ago and broke some teeth, has not left the house since then because he is afraid to go out.  He states he is "tired of living," feels he has "nothing in life" and doesn't expect this to change.  He reports depressed mood, sleep disturbance, sometimes feels tired, anhedonia, eats 1 meal/day (delivered to the apt).  Pt states that he "wants it to end" and has thought of killing himself recently by taking all the pills in his house.  He states he came close to acting on it but did not, noting that when he woke up a few days later he no longer had that thought.  He reports "feeling lost" and "doesn't want to go on."  He states that he needs help because he "can't function on his own" and feels there is "no way he is going to survive."  He states he has not been tending to his apt or ADLs.  He believes one of his disability/pension payments must have stopped because some bills haven't been paid, has not seen a doctor in over 1.5 years, has not been taking any medications for medical/psych/pain reasons.  He gives consent for [employer] to speak with his ex-wife and brothers (though doesn't have their numbers at this time).  He does not want his daughter contacted as he does not want to upset her.      Patient interviewed on unit:  Patient provides more but similar information as that provided in the ED. Today patient reports that he has a hx of depression for which he would previously follow with a psychiatrist. He reports that his provider relayed that the practice would close and the patient reports that thereafter the patient stopped following up and stopped taking medications. Patient reports that 1 yr ago he fell and knocked out some teeth which he reports lead him to stay indoors. The patient denied that he was self concious about his teeth situation and could not provide a reason as to why his loss of teeth lead him to be more insolated. He reports that as time passed he became more and more isolated and depressed and developed some thoughts about wanting to die with ideas of OD as he had one previous attempt. He reports that he was brought to the hospital as his ex-wife had sent a wellness check and he reports that he is hoping to get the help that he needs.    Patient seen and evaluated 12/14/23. As per nursing report no acute events. On approach patient calm and cooperative. No agitation or aggression noted.  Patient verbalizes knowing he needs to stick to the plan set up for outpatient. States he will follow up with his psychiatrist and therapist appointments. States he will make all his medical appointments when discharged and continue to follow up with his medical care. States the first step is getting his place cleaned out and they are coming Saturday. Patient states has been elevating his legs and they are improving. Swelling has gone down. He will follow up with his PCP and see if he needs to be referred to other specialities. Patient continues to verbalize an improved mood and admits coming into the hospital was what he needed. Appears future oriented and optimistic. Patient denies any suicidal/homicidal ideations. Able to contract for safety. Denies any A/V hallucinations. No evidence of psychosis noted. Patient visible on the unit engaging with peers and attending groups. Anticipated discharge tomorrow 12/15/23. Compliant with medication. Does not warrant continued Inpatient hospitalization. Patient does not present a risk to self or others at this time.    Patient ambulates independently without a walker.     The patient is a 58-year-old male; ; domiciled alone; has 2 adult daughters; on disability/pension >10 years for injury while at work; self-reported PPHx of depression, denies prior ED visits or admissions, hx of SA; PMHx of renal cancer, back pain; BIB EMS; psychiatry consulted for SI.  Pt states that he fell about 1 year ago and broke some teeth, has not left the house since then because he is afraid to go out.  He states he is "tired of living," feels he has "nothing in life" and doesn't expect this to change.  He reports depressed mood, sleep disturbance, sometimes feels tired, anhedonia, eats 1 meal/day (delivered to the apt).  Pt states that he "wants it to end" and has thought of killing himself recently by taking all the pills in his house.  He states he came close to acting on it but did not, noting that when he woke up a few days later he no longer had that thought.  He reports "feeling lost" and "doesn't want to go on."  He states that he needs help because he "can't function on his own" and feels there is "no way he is going to survive."  He states he has not been tending to his apt or ADLs.  He believes one of his disability/pension payments must have stopped because some bills haven't been paid, has not seen a doctor in over 1.5 years, has not been taking any medications for medical/psych/pain reasons.  He gives consent for [employer] to speak with his ex-wife and brothers (though doesn't have their numbers at this time).  He does not want his daughter contacted as he does not want to upset her.      Patient interviewed on unit:  Patient provides more but similar information as that provided in the ED. Today patient reports that he has a hx of depression for which he would previously follow with a psychiatrist. He reports that his provider relayed that the practice would close and the patient reports that thereafter the patient stopped following up and stopped taking medications. Patient reports that 1 yr ago he fell and knocked out some teeth which he reports lead him to stay indoors. The patient denied that he was self concious about his teeth situation and could not provide a reason as to why his loss of teeth lead him to be more insolated. He reports that as time passed he became more and more isolated and depressed and developed some thoughts about wanting to die with ideas of OD as he had one previous attempt. He reports that he was brought to the hospital as his ex-wife had sent a wellness check and he reports that he is hoping to get the help that he needs.    Patient seen and evaluated 12/14/23. As per nursing report no acute events. On approach patient calm and cooperative. No agitation or aggression noted.  Patient verbalizes knowing he needs to stick to the plan set up for outpatient. States he will follow up with his psychiatrist and therapist appointments. States he will make all his medical appointments when discharged and continue to follow up with his medical care. States the first step is getting his place cleaned out and they are coming Saturday. Patient states has been elevating his legs and they are improving. Swelling has gone down. He will follow up with his PCP and see if he needs to be referred to other specialities. Patient continues to verbalize an improved mood and admits coming into the hospital was what he needed. Appears future oriented and optimistic. Patient denies any suicidal/homicidal ideations. Able to contract for safety. Denies any A/V hallucinations. No evidence of psychosis noted. Patient visible on the unit engaging with peers and attending groups. Anticipated discharge tomorrow 12/15/23. Compliant with medication. Does not warrant continued Inpatient hospitalization. Patient does not present a risk to self or others at this time.    Patient ambulates independently without a walker.

## 2023-12-13 NOTE — BH INPATIENT PSYCHIATRY DISCHARGE NOTE - HPI (INCLUDE ILLNESS QUALITY, SEVERITY, DURATION, TIMING, CONTEXT, MODIFYING FACTORS, ASSOCIATED SIGNS AND SYMPTOMS)
The patient is a 58-year-old male; ; domiciled alone; has 2 adult daughters; on disability/pension >10 years for injury while at work; self-reported PPHx of depression, denies prior ED visits or admissions, hx of SA; PMHx of renal cancer, back pain; BIB EMS; psychiatry consulted for SI.  Pt states that he fell about 1 year ago and broke some teeth, has not left the house since then because he is afraid to go out.  He states he is "tired of living," feels he has "nothing in life" and doesn't expect this to change.  He reports depressed mood, sleep disturbance, sometimes feels tired, anhedonia, eats 1 meal/day (delivered to the apt).  Pt states that he "wants it to end" and has thought of killing himself recently by taking all the pills in his house.  He states he came close to acting on it but did not, noting that when he woke up a few days later he no longer had that thought.  He reports "feeling lost" and "doesn't want to go on."  He states that he needs help because he "can't function on his own" and feels there is "no way he is going to survive."  He states he has not been tending to his apt or ADLs.  He believes one of his disability/pension payments must have stopped because some bills haven't been paid, has not seen a doctor in over 1.5 years, has not been taking any medications for medical/psych/pain reasons.  He gives consent for [employer] to speak with his ex-wife and brothers (though doesn't have their numbers at this time).  He does not want his daughter contacted as he does not want to upset her.      Covid Screen - Patient  denies positive test in past 3 months  denies recent exposures    Patient interviewed on unit:  Patient provides more but similar information as that provided in the ED. Today patient reports that he has a hx of depression for which he would previously follow with a psychiatrist. He reports that his provider relayed that the practice would close and the patient reports that thereafter the patient stopped following up and stopped taking medications. Patient reports that 1 yr ago he fell and knocked out some teeth which he reports lead him to stay indoors. The patient denied that he was self concious about his teeth situation and could not provide a reason as to why his loss of teeth lead him to be more insolated. He reports that as time passed he became more and more isolated and depressed and developed some thoughts about wanting to die with ideas of OD as he had one previous attempt. He reports that he was brought to the hospital as his ex-wife had sent a wellness check and he reports that he is hoping to get the help that he needs.  The patient is a 58-year-old male; ; domiciled alone; has 2 adult daughters; on disability/pension >10 years for injury while at work; self-reported PPHx of depression, denies prior ED visits or admissions, hx of SA; PMHx of renal cancer, back pain; BIB EMS; psychiatry consulted for SI.  Pt states that he fell about 1 year ago and broke some teeth, has not left the house since then because he is afraid to go out.  He states he is "tired of living," feels he has "nothing in life" and doesn't expect this to change.  He reports depressed mood, sleep disturbance, sometimes feels tired, anhedonia, eats 1 meal/day (delivered to the apt).  Pt states that he "wants it to end" and has thought of killing himself recently by taking all the pills in his house.  He states he came close to acting on it but did not, noting that when he woke up a few days later he no longer had that thought.  He reports "feeling lost" and "doesn't want to go on."  He states that he needs help because he "can't function on his own" and feels there is "no way he is going to survive."  He states he has not been tending to his apt or ADLs.  He believes one of his disability/pension payments must have stopped because some bills haven't been paid, has not seen a doctor in over 1.5 years, has not been taking any medications for medical/psych/pain reasons.  He gives consent for [employer] to speak with his ex-wife and brothers (though doesn't have their numbers at this time).  He does not want his daughter contacted as he does not want to upset her.      Covid Screen - Patient  denies positive test in past 3 months  denies recent exposures    Patient interviewed on unit:  Patient provides more but similar information as that provided in the ED. Today patient reports that he has a hx of depression for which he would previously follow with a psychiatrist. He reports that his provider relayed that the practice would close and the patient reports that thereafter the patient stopped following up and stopped taking medications. Patient reports that 1 yr ago he fell and knocked out some teeth which he reports lead him to stay indoors. The patient denied that he was self concious about his teeth situation and could not provide a reason as to why his loss of teeth lead him to be more insolated. He reports that as time passed he became more and more isolated and depressed and developed some thoughts about wanting to die with ideas of OD as he had one previous attempt. He reports that he was brought to the hospital as his ex-wife had sent a wellness check and he reports that he is hoping to get the help that he needs.  The patient is a 58-year-old male; ; domiciled alone; has 2 adult daughters; on disability/pension >10 years for injury while at work; self-reported PPHx of depression, denies prior ED visits or admissions, hx of SA; PMHx of renal cancer, back pain; BIB EMS; psychiatry consulted for SI.  Pt states that he fell about 1 year ago and broke some teeth, has not left the house since then because he is afraid to go out.  He states he is "tired of living," feels he has "nothing in life" and doesn't expect this to change.  He reports depressed mood, sleep disturbance, sometimes feels tired, anhedonia, eats 1 meal/day (delivered to the apt).  Pt states that he "wants it to end" and has thought of killing himself recently by taking all the pills in his house.  He states he came close to acting on it but did not, noting that when he woke up a few days later he no longer had that thought.  He reports "feeling lost" and "doesn't want to go on."  He states that he needs help because he "can't function on his own" and feels there is "no way he is going to survive."  He states he has not been tending to his apt or ADLs.  He believes one of his disability/pension payments must have stopped because some bills haven't been paid, has not seen a doctor in over 1.5 years, has not been taking any medications for medical/psych/pain reasons.  He gives consent for [employer] to speak with his ex-wife and brothers (though doesn't have their numbers at this time).  He does not want his daughter contacted as he does not want to upset her.      Patient seen and evaluated 12/14/23. As per nursing report no acute events. On approach patient calm and cooperative. No agitation or aggression noted.  Patient verbalizes knowing he needs to stick to the plan set up for outpatient. States he will follow up with his psychiatrist and therapist appointments. States he will make all his medical appointments when discharged and continue to follow up with his medical care. States the first step is getting his place cleaned out and they are coming Saturday. Patient states has been elevating his legs and they are improving. Swelling has gone down. He will follow up with his PCP and see if he needs to be referred to other specialities. Patient continues to verbalize an improved mood and admits coming into the hospital was what he needed. Appears future oriented and optimistic. Patient denies any suicidal/homicidal ideations. Able to contract for safety. Denies any A/V hallucinations. No evidence of psychosis noted. Patient visible on the unit engaging with peers and attending groups. Anticipated discharge tomorrow 12/15/23. Compliant with medication. Does not warrant continued Inpatient hospitalization. Patient does not present a risk to self or others at this time.    Patient ambulates independently without a walker.

## 2023-12-13 NOTE — BH INPATIENT PSYCHIATRY PROGRESS NOTE - NSBHATTESTBILLING_PSY_A_CORE
21130-Caxfshwynz OBS or IP - moderate complexity OR 35-49 mins 10118-[id number] OBS or IP - moderate complexity OR 35-49 mins

## 2023-12-13 NOTE — BH INPATIENT PSYCHIATRY PROGRESS NOTE - NSBHFUPINTERVALHXFT_PSY_A_CORE
Patient seen and evaluated. As per nursing report no acute events. On approach patient calm and cooperative. No agitation or aggression noted.  Patient states they are coming to clean out his place on Saturday. His brother was assisting in the arrangements. Patient states has been elevating his legs and they are improving. Swelling is going down. Patient states he will follow up outpatient. Will continue to monitor and recall hospitalist if needed. Patient continues to verbalize an improved mood. Appears future oriented and optimistic. Talked about coping skills. Patient verbalized looking into some community activities like [employer] and a [facility]. is discussed patient brings up needing to get his place cleaned out. Patient denies any suicidal/homicidal ideations. Able to contract for safety. Denies any A/V hallucinations. No evidence of psychosis noted. Patient visible on the unit engaging with peers and attending groups. Anticipated discharge later in week provided patient continues to improve.     spoke to patients brother. Made him aware anticipated discharge later in week provided patient continues to improve.    PT recommended a walker due to bilateral neuropathy. Patient ambulates independently without a walker.   Patient seen and evaluated. As per nursing report no acute events. On approach patient calm and cooperative. No agitation or aggression noted.  Patient states they are coming to clean out his place on Saturday. His brother was assisting in the arrangements. Patient states has been elevating his legs and they are improving. Swelling is going down. Patient states he will follow up outpatient. Will continue to monitor and recall hospitalist if needed. Patient continues to verbalize an improved mood. Appears future oriented and optimistic. Talked about coping skills. Patient verbalized looking into some community activities like [employer] and a [facility]. is discussed patient brings up needing to get his place cleaned out. Patient denies any suicidal/homicidal ideations. Able to contract for safety. Denies any A/V hallucinations. No evidence of psychosis noted. Patient visible on the unit engaging with peers and attending groups. Anticipated discharge later in week provided patient continues to improve.     spoke to patients brother. Made him aware anticipated discharge later in week provided patient continues to improve.    PT recommended a walker due to bilateral neuropathy. Patient ambulates independently without a walker.   Patient seen and evaluated. As per nursing report no acute events. On approach patient calm and cooperative. No agitation or aggression noted.  Patient states they are coming to clean out his place on Saturday. His brother was assisting in the arrangements. Patient states has been elevating his legs and they are improving. Swelling is going down. Patient states he will follow up outpatient. Will continue to monitor and recall hospitalist if needed. Patient continues to verbalize an improved mood. Appears future oriented and optimistic. Talked about coping skills. Patient verbalized looking into some community activities like [employer] and a [employer] Discussed following up with medical appointments including his PCP, hematologist, Urologist ect. ( Acute DVT. Xarelto 15 mg BID for 21 days then switch to 20 mg daily). h/o renal mass, s/p partial right nephrectomy). Patient states he will make his own appointments when discharged. Patient denies any suicidal/homicidal ideations. Able to contract for safety. Denies any A/V hallucinations. No evidence of psychosis noted. Patient visible on the unit engaging with peers and attending groups. Anticipated discharge later in week provided patient continues to improve.     spoke to patients brother. Made him aware anticipated discharge later in week provided patient continues to improve.    PT recommended a walker due to bilateral neuropathy. Patient ambulates independently without a walker.   Patient seen and evaluated. As per nursing report no acute events. On approach patient calm and cooperative. No agitation or aggression noted.  Patient states they are coming to clean out his place on Saturday. His brother was assisting in the arrangements. Patient states has been elevating his legs and they are improving. Swelling is going down. Patient states he will follow up outpatient. Will continue to monitor and recall hospitalist if needed. Patient continues to verbalize an improved mood. Appears future oriented and optimistic. Talked about coping skills. Patient verbalized looking into some community activities like [employer] and a [employer]. Discussed following up with medical appointments including his PCP, hematologist, Urologist ect. ( Acute DVT. Xarelto 15 mg BID for 21 days then switch to 20 mg daily). h/o renal mass, s/p partial right nephrectomy). Patient states he will make his own appointments when discharged. Patient denies any suicidal/homicidal ideations. Able to contract for safety. Denies any A/V hallucinations. No evidence of psychosis noted. Patient visible on the unit engaging with peers and attending groups. Anticipated discharge later in week provided patient continues to improve.     spoke to patients brother. Made him aware anticipated discharge later in week provided patient continues to improve.    PT recommended a walker due to bilateral neuropathy. Patient ambulates independently without a walker.

## 2023-12-13 NOTE — BH INPATIENT PSYCHIATRY DISCHARGE NOTE - NSDCCPCAREPLAN_GEN_ALL_CORE_FT
PRINCIPAL DISCHARGE DIAGNOSIS  Diagnosis: MDD (major depressive disorder)  Assessment and Plan of Treatment: Patient to continue with prescribed medication and follow up with outpatient

## 2023-12-13 NOTE — BH INPATIENT PSYCHIATRY DISCHARGE NOTE - NSDCMRMEDTOKEN_GEN_ALL_CORE_FT
calcium-vitamin D:   DIAZEPAM 10 MG TABLET: TAKE 1 TO 2 TABLETS BY MOUTH AT BEDTIME  GABAPENTIN 600MG TABLET: TAKE 1 TABLET BY MOUTH TWICE DAILY  OXYCODONE HCL 20MG TABLET: TAKE 1 TABLET BY MOUTH THREE TIMES A DAY  MAX 3 TABLETS PER DAY  PROzac 10 mg oral capsule: 1 cap(s) orally once a day  QUETIAPINE FUMARATE 200 MG TAB: 1 each orally once a day (at bedtime)  SIMVASTATIN 20 MG TABLET: TAKE 1 TABLET(S) BY MOUTH ONCE A DAY FOR 90 DAYS  TAMSULOSIN   CAP 0.4:   XARELTO 20 MG TABLET: TAKE 1 TABLET BY MOUTH EVERY DAY WITH FOOD   FLUoxetine 40 mg oral capsule: 1 cap(s) orally once a day x 30 days Continue until told otherwise by your provider  gabapentin 800 mg oral tablet: 1 tab(s) orally 2 times a day x 14 days Continue until told otherwise by your provider  nicotine 2 mg oral transmucosal gum: 1 gum chewed every 6 hours x 5 days as needed for Smoking Cessation  pantoprazole 40 mg oral delayed release tablet: 1 tab(s) orally once a day (before a meal) x 14 days Continue until told otherwise by your provider  polyethylene glycol 3350 oral powder for reconstitution: 17 gram(s) orally once a day x 14 days as needed for Constipation Continue until told otherwise by your provider  rivaroxaban 15 mg oral tablet: 1 tab(s) orally 2 times a day (with meals) x 11 days Continue until told otherwise by your provider  rivaroxaban 20 mg oral tablet: 1 tab(s) orally once (at bedtime) x 14 days With a meal. Start on 12/26/23. Continue until told otherwise by your provider  senna leaf extract oral tablet: 2 tab(s) orally once a day (at bedtime) x 14 days as needed for constipation Continue until told otherwise by your provider  traZODone 100 mg oral tablet: 1 tab(s) orally once a day (at bedtime) x 30 days Continue until told otherwise by your provider

## 2023-12-13 NOTE — BH INPATIENT PSYCHIATRY PROGRESS NOTE - NSBHASSESSSUMMFT_PSY_ALL_CORE
The patient is a 58-year-old male; ; domiciled alone; has 2 adult daughters; on disability/pension >10 years for injury while at work; self-reported PPHx of depression, denies prior ED visits or admissions, hx of SA; PMHx of renal cancer, back pain; BIB EMS; psychiatry consulted for SI.  Pt with worsening depression and decline in functioning over the past year, currently with SI and admitted for the same.     Patient seen and evaluated. As per nursing report no acute events. On approach patient calm and cooperative. No agitation or aggression noted.  Patient states they are coming to clean out his place on Saturday. His brother was assisting in the arrangements. Patient states has been elevating his legs and they are improving. Swelling is going down. Patient states he will follow up outpatient. Will continue to monitor and recall hospitalist if needed. Patient continues to verbalize an improved mood. Appears future oriented and optimistic. Talked about coping skills. Patient verbalized looking into some community activities like [employer] and a [facility]. is discussed patient brings up needing to get his place cleaned out. Patient denies any suicidal/homicidal ideations. Able to contract for safety. Denies any A/V hallucinations. No evidence of psychosis noted. Patient visible on the unit engaging with peers and attending groups. Anticipated discharge later in week provided patient continues to improve.     spoke to patients brother. Made him aware anticipated discharge later in week provided patient continues to improve.    PT recommended a walker due to bilateral neuropathy. Patient ambulates independently without a walker.    #Admit    #MDD  -Prozac 20mg daily--> 40mg daily 12/6/23  -Trazodone 100mg for insomnia    -Haldol 5mg Q8 PRN for agitation  -Lorazepam 2mg Q8 PRN for aggression    ##Tobacco use disorder  -Nicotine Gum PRN    -Pantoprazole  -Gabapentin  -Tylenol PRN The patient is a 58-year-old male; ; domiciled alone; has 2 adult daughters; on disability/pension >10 years for injury while at work; self-reported PPHx of depression, denies prior ED visits or admissions, hx of SA; PMHx of renal cancer, back pain; BIB EMS; psychiatry consulted for SI.  Pt with worsening depression and decline in functioning over the past year, currently with SI and admitted for the same.     Patient seen and evaluated. As per nursing report no acute events. On approach patient calm and cooperative. No agitation or aggression noted.  Patient states they are coming to clean out his place on Saturday. His brother was assisting in the arrangements. Patient states has been elevating his legs and they are improving. Swelling is going down. Patient states he will follow up outpatient. Will continue to monitor and recall hospitalist if needed. Patient continues to verbalize an improved mood. Appears future oriented and optimistic. Talked about coping skills. Patient verbalized looking into some community activities like [place] and a [facility]. Discussed following up with medical appointments including his PCP, hematologist, Urologist ect. ( Acute DVT. Xarelto 15 mg BID for 21 days then switch to 20 mg daily). h/o renal mass, s/p partial right nephrectomy). Patient states he will make his own appointments when discharged. Patient denies any suicidal/homicidal ideations. Able to contract for safety. Denies any A/V hallucinations. No evidence of psychosis noted. Patient visible on the unit engaging with peers and attending groups. Anticipated discharge later in week provided patient continues to improve.     spoke to patients brother. Made him aware anticipated discharge later in week provided patient continues to improve.    PT recommended a walker due to bilateral neuropathy. Patient ambulates independently without a walker.    #Admit    #MDD  -Prozac 20mg daily--> 40mg daily 12/6/23  -Trazodone 100mg for insomnia    -Haldol 5mg Q8 PRN for agitation  -Lorazepam 2mg Q8 PRN for aggression    ##Tobacco use disorder  -Nicotine Gum PRN    -Pantoprazole  -Gabapentin  -Tylenol PRN The patient is a 58-year-old male; ; domiciled alone; has 2 adult daughters; on disability/pension >10 years for injury while at work; self-reported PPHx of depression, denies prior ED visits or admissions, hx of SA; PMHx of renal cancer, back pain; BIB EMS; psychiatry consulted for SI.  Pt with worsening depression and decline in functioning over the past year, currently with SI and admitted for the same.     Patient seen and evaluated. As per nursing report no acute events. On approach patient calm and cooperative. No agitation or aggression noted.  Patient states they are coming to clean out his place on Saturday. His brother was assisting in the arrangements. Patient states has been elevating his legs and they are improving. Swelling is going down. Patient states he will follow up outpatient. Will continue to monitor and recall hospitalist if needed. Patient continues to verbalize an improved mood. Appears future oriented and optimistic. Talked about coping skills. Patient verbalized looking into some community activities like [employer] [employer] and a [facility]. Discussed following up with medical appointments including his PCP, hematologist, Urologist ect. ( Acute DVT. Xarelto 15 mg BID for 21 days then switch to 20 mg daily). h/o renal mass, s/p partial right nephrectomy). Patient states he will make his own appointments when discharged. Patient denies any suicidal/homicidal ideations. Able to contract for safety. Denies any A/V hallucinations. No evidence of psychosis noted. Patient visible on the unit engaging with peers and attending groups. Anticipated discharge later in week provided patient continues to improve.     spoke to patients brother. Made him aware anticipated discharge later in week provided patient continues to improve.    PT recommended a walker due to bilateral neuropathy. Patient ambulates independently without a walker.    #Admit    #MDD  -Prozac 20mg daily--> 40mg daily 12/6/23  -Trazodone 100mg for insomnia    -Haldol 5mg Q8 PRN for agitation  -Lorazepam 2mg Q8 PRN for aggression    ##Tobacco use disorder  -Nicotine Gum PRN    -Pantoprazole  -Gabapentin  -Tylenol PRN

## 2023-12-13 NOTE — BH INPATIENT PSYCHIATRY DISCHARGE NOTE - NSBHASSESSSUMMFT_PSY_ALL_CORE
The patient is a 58-year-old male; ; domiciled alone; has 2 adult daughters; on disability/pension >10 years for injury while at work; self-reported PPHx of depression, denies prior ED visits or admissions, hx of SA; PMHx of renal cancer, back pain; BIB EMS; psychiatry consulted for SI.  Pt with worsening depression and decline in functioning over the past year, currently with SI and admitted for the same.     Patient seen and evaluated. As per nursing report no acute events. On approach patient calm and cooperative. No agitation or aggression noted.  Patient verbalizes knowing he needs to stick to the plan set up for outpatient. States he will follow up with his psychiatrist and therapist appointments. States he will make all his medical appointments when discharged and continue to follow up with his medical care. States the first step is getting his place cleaned out and they are coming Saturday. Patient states has been elevating his legs and they are improving. Swelling has gone down. He will follow up with his PCP and see if he needs to be referred to other specialities. Patient continues to verbalize an improved mood and admits coming into the hospital was what he needed. Appears future oriented and optimistic. Patient denies any suicidal/homicidal ideations. Able to contract for safety. Denies any A/V hallucinations. No evidence of psychosis noted. Patient visible on the unit engaging with peers and attending groups. Anticipated discharge tomorrow 12/15/23. Compliant with medication. Does not warrant continued Inpatient hospitalization. Patient does not present a risk to self or others at this time.    Patient ambulates independently without a walker.      #Admit    #MDD  -Prozac 40mg daily   -Trazodone 100mg for insomnia    ##Tobacco use disorder  -Nicotine Gum PRN    -Pantoprazole  -Gabapentin  -Tylenol PRN

## 2023-12-13 NOTE — BH INPATIENT PSYCHIATRY DISCHARGE NOTE - NSBHDCMEDICALFT_PSY_A_CORE
Acute DVT. Xarelto (15 mg BID for 21 days then switch to 20 mg daily). h/o renal mass, s/p partial right nephrectomy. Patient states he will Followup with urologist outpatient.    Pt can f/u in o/p Podiatry clinic for routine f/u; possible steroid injection; at [address], [place], 1 week post-DSC

## 2023-12-13 NOTE — BH INPATIENT PSYCHIATRY DISCHARGE NOTE - NSDCPROCEDURESFT_PSY_ALL_CORE
Acute DVT. Xarelto (15 mg BID for 21 days then switch to 20 mg daily). h/o renal mass, s/p partial right nephrectomy. Patient states he will Followup with urologist outpatient.

## 2023-12-13 NOTE — BH INPATIENT PSYCHIATRY DISCHARGE NOTE - NSBHFUPINTERVALHXFT_PSY_A_CORE
Patient seen and evaluated 12/14/23. As per nursing report no acute events. On approach patient calm and cooperative. No agitation or aggression noted.  Patient verbalizes knowing he needs to stick to the plan set up for outpatient. States he will follow up with his psychiatrist and therapist appointments. States he will make all his medical appointments when discharged and continue to follow up with his medical care. States the first step is getting his place cleaned out and they are coming Saturday. Patient states has been elevating his legs and they are improving. Swelling has gone down. He will follow up with his PCP and see if he needs to be referred to other specialities. Patient continues to verbalize an improved mood and admits coming into the hospital was what he needed. Appears future oriented and optimistic. Patient denies any suicidal/homicidal ideations. Able to contract for safety. Denies any A/V hallucinations. No evidence of psychosis noted. Patient visible on the unit engaging with peers and attending groups. Anticipated discharge tomorrow 12/15/23. Compliant with medication. Does not warrant continued Inpatient hospitalization. Patient does not present a risk to self or others at this time.    Patient ambulates independently without a walker.

## 2023-12-13 NOTE — BH INPATIENT PSYCHIATRY PROGRESS NOTE - NSBHMETABOLIC_PSY_ALL_CORE_FT
BMI: BMI (kg/m2): 38.9 (12-05-23 @ 11:48)  HbA1c: A1C with Estimated Average Glucose Result: 5.2 % (12-03-23 @ 08:35)    Glucose:   BP: 103/56 (12-13-23 @ 07:44) (94/55 - 126/61)Vital Signs Last 24 Hrs  T(C): 35.6 (12-13-23 @ 07:44), Max: 36.3 (12-12-23 @ 20:33)  T(F): 96.1 (12-13-23 @ 07:44), Max: 97.3 (12-12-23 @ 20:33)  HR: 63 (12-13-23 @ 07:44) (61 - 63)  BP: 103/56 (12-13-23 @ 07:44) (94/55 - 103/56)  BP(mean): --  RR: 16 (12-13-23 @ 07:44) (16 - 16)  SpO2: --      Lipid Panel: Date/Time: 12-03-23 @ 08:35  Cholesterol, Serum: 167  LDL Cholesterol Calculated: 115  HDL Cholesterol, Serum: 36  Total Cholesterol/HDL Ration Measurement: --  Triglycerides, Serum: 79

## 2023-12-13 NOTE — BH INPATIENT PSYCHIATRY PROGRESS NOTE - NSBHCHARTREVIEWVS_PSY_A_CORE FT
Vital Signs Last 24 Hrs  T(C): 35.6 (12-13-23 @ 07:44), Max: 36.3 (12-12-23 @ 20:33)  T(F): 96.1 (12-13-23 @ 07:44), Max: 97.3 (12-12-23 @ 20:33)  HR: 63 (12-13-23 @ 07:44) (61 - 63)  BP: 103/56 (12-13-23 @ 07:44) (94/55 - 103/56)  BP(mean): --  RR: 16 (12-13-23 @ 07:44) (16 - 16)  SpO2: --

## 2023-12-13 NOTE — BH INPATIENT PSYCHIATRY DISCHARGE NOTE - NSDCRECOMMENDMEDICALFT_PSY_ALL_CORE
Acute DVT. Xarelto (15 mg BID for 21 days then switch to 20 mg daily). h/o renal mass, s/p partial right nephrectomy. Patient to Followup with PCP within 2 weeks.

## 2023-12-14 PROCEDURE — 99232 SBSQ HOSP IP/OBS MODERATE 35: CPT

## 2023-12-14 RX ORDER — POLYETHYLENE GLYCOL 3350 17 G/17G
17 POWDER, FOR SOLUTION ORAL
Qty: 238 | Refills: 0
Start: 2023-12-14 | End: 2023-12-27

## 2023-12-14 RX ORDER — TRAZODONE HCL 50 MG
1 TABLET ORAL
Qty: 30 | Refills: 0
Start: 2023-12-14 | End: 2024-01-12

## 2023-12-14 RX ORDER — OXYCODONE HYDROCHLORIDE 5 MG/1
0 TABLET ORAL
Qty: 0 | Refills: 0 | DISCHARGE

## 2023-12-14 RX ORDER — SENNA PLUS 8.6 MG/1
2 TABLET ORAL
Qty: 28 | Refills: 0
Start: 2023-12-14 | End: 2023-12-27

## 2023-12-14 RX ORDER — RIVAROXABAN 15 MG-20MG
0 KIT ORAL
Qty: 0 | Refills: 1 | DISCHARGE

## 2023-12-14 RX ORDER — FLUOXETINE HCL 10 MG
1 CAPSULE ORAL
Qty: 0 | Refills: 0 | DISCHARGE

## 2023-12-14 RX ORDER — PANTOPRAZOLE SODIUM 20 MG/1
1 TABLET, DELAYED RELEASE ORAL
Qty: 14 | Refills: 0
Start: 2023-12-14 | End: 2023-12-27

## 2023-12-14 RX ORDER — TAMSULOSIN HYDROCHLORIDE 0.4 MG/1
0 CAPSULE ORAL
Qty: 0 | Refills: 0 | DISCHARGE

## 2023-12-14 RX ORDER — NICOTINE POLACRILEX 2 MG
1 GUM BUCCAL
Qty: 30 | Refills: 0
Start: 2023-12-14 | End: 2023-12-18

## 2023-12-14 RX ORDER — SIMVASTATIN 20 MG/1
0 TABLET, FILM COATED ORAL
Qty: 0 | Refills: 0 | DISCHARGE

## 2023-12-14 RX ORDER — RIVAROXABAN 15 MG-20MG
1 KIT ORAL
Qty: 1 | Refills: 0
Start: 2023-12-14 | End: 2023-12-27

## 2023-12-14 RX ORDER — GABAPENTIN 400 MG/1
1 CAPSULE ORAL
Qty: 28 | Refills: 0
Start: 2023-12-14 | End: 2023-12-27

## 2023-12-14 RX ORDER — RIVAROXABAN 15 MG-20MG
1 KIT ORAL
Qty: 22 | Refills: 0
Start: 2023-12-14 | End: 2023-12-24

## 2023-12-14 RX ORDER — QUETIAPINE FUMARATE 200 MG/1
1 TABLET, FILM COATED ORAL
Qty: 0 | Refills: 4 | DISCHARGE

## 2023-12-14 RX ORDER — DIAZEPAM 5 MG
0 TABLET ORAL
Qty: 0 | Refills: 0 | DISCHARGE

## 2023-12-14 RX ORDER — GABAPENTIN 400 MG/1
0 CAPSULE ORAL
Qty: 0 | Refills: 1 | DISCHARGE

## 2023-12-14 RX ORDER — FLUOXETINE HCL 10 MG
1 CAPSULE ORAL
Qty: 30 | Refills: 0
Start: 2023-12-14 | End: 2024-01-12

## 2023-12-14 RX ADMIN — Medication 40 MILLIGRAM(S): at 08:16

## 2023-12-14 RX ADMIN — RIVAROXABAN 15 MILLIGRAM(S): KIT at 16:55

## 2023-12-14 RX ADMIN — Medication 650 MILLIGRAM(S): at 14:07

## 2023-12-14 RX ADMIN — Medication 100 MILLIGRAM(S): at 20:15

## 2023-12-14 RX ADMIN — GABAPENTIN 800 MILLIGRAM(S): 400 CAPSULE ORAL at 08:17

## 2023-12-14 RX ADMIN — GABAPENTIN 800 MILLIGRAM(S): 400 CAPSULE ORAL at 20:15

## 2023-12-14 RX ADMIN — Medication 650 MILLIGRAM(S): at 13:35

## 2023-12-14 RX ADMIN — POLYETHYLENE GLYCOL 3350 17 GRAM(S): 17 POWDER, FOR SOLUTION ORAL at 13:30

## 2023-12-14 RX ADMIN — RIVAROXABAN 15 MILLIGRAM(S): KIT at 08:16

## 2023-12-14 RX ADMIN — PANTOPRAZOLE SODIUM 40 MILLIGRAM(S): 20 TABLET, DELAYED RELEASE ORAL at 06:33

## 2023-12-14 NOTE — BH INPATIENT PSYCHIATRY PROGRESS NOTE - NSBHMETABOLIC_PSY_ALL_CORE_FT
BMI: BMI (kg/m2): 38.9 (12-05-23 @ 11:48)  HbA1c: A1C with Estimated Average Glucose Result: 5.2 % (12-03-23 @ 08:35)    Glucose:   BP: 118/68 (12-14-23 @ 08:28) (94/55 - 126/61)Vital Signs Last 24 Hrs  T(C): 35.9 (12-13-23 @ 16:10), Max: 35.9 (12-13-23 @ 16:10)  T(F): 96.7 (12-13-23 @ 16:10), Max: 96.7 (12-13-23 @ 16:10)  HR: 59 (12-14-23 @ 08:28) (54 - 59)  BP: 118/68 (12-14-23 @ 08:28) (102/58 - 118/68)  BP(mean): --  RR: 18 (12-14-23 @ 08:28) (18 - 19)  SpO2: --      Lipid Panel: Date/Time: 12-03-23 @ 08:35  Cholesterol, Serum: 167  LDL Cholesterol Calculated: 115  HDL Cholesterol, Serum: 36  Total Cholesterol/HDL Ration Measurement: --  Triglycerides, Serum: 79

## 2023-12-14 NOTE — BH INPATIENT PSYCHIATRY PROGRESS NOTE - NSBHASSESSSUMMFT_PSY_ALL_CORE
The patient is a 58-year-old male; ; domiciled alone; has 2 adult daughters; on disability/pension >10 years for injury while at work; self-reported PPHx of depression, denies prior ED visits or admissions, hx of SA; PMHx of renal cancer, back pain; BIB EMS; psychiatry consulted for SI.  Pt with worsening depression and decline in functioning over the past year, currently with SI and admitted for the same.     Patient seen and evaluated. As per nursing report no acute events. On approach patient calm and cooperative. No agitation or aggression noted.  Patient verbalizes knowing he needs to stick to the plan set up for outpatient. States he will follow up with his psychiatrist and therapist appointments. States he will make all his medical appointments when discharged and continue to follow up with his medical care. States the first step is getting his place cleaned out and they are coming Saturday. Patient states has been elevating his legs and they are improving. Swelling is going down. He will follow up with his PCP and see if he needs to be referred to other specialities. Patient continues to verbalize an improved mood and admits coming into the hospital was what he needed. Appears future oriented and optimistic. Patient denies any suicidal/homicidal ideations. Able to contract for safety. Denies any A/V hallucinations. No evidence of psychosis noted. Patient visible on the unit engaging with peers and attending groups. Anticipated discharge tomorrow 12/15/23. Compliant with medication. Does not warrant continued Inpatient hospitalization. Patient does not present a risk to self or others at this time.    PT recommended a walker due to bilateral neuropathy. Patient ambulates independently without a walker.    PT recommended a walker due to bilateral neuropathy. Patient ambulates independently without a walker.    #Admit    #MDD  -Prozac 20mg daily--> 40mg daily 12/6/23  -Trazodone 100mg for insomnia    -Haldol 5mg Q8 PRN for agitation  -Lorazepam 2mg Q8 PRN for aggression    ##Tobacco use disorder  -Nicotine Gum PRN    -Pantoprazole  -Gabapentin  -Tylenol PRN The patient is a 58-year-old male; ; domiciled alone; has 2 adult daughters; on disability/pension >10 years for injury while at work; self-reported PPHx of depression, denies prior ED visits or admissions, hx of SA; PMHx of renal cancer, back pain; BIB EMS; psychiatry consulted for SI.  Pt with worsening depression and decline in functioning over the past year, currently with SI and admitted for the same.     Patient seen and evaluated. As per nursing report no acute events. On approach patient calm and cooperative. No agitation or aggression noted.  Patient verbalizes knowing he needs to stick to the plan set up for outpatient. States he will follow up with his psychiatrist and therapist appointments. States he will make all his medical appointments when discharged and continue to follow up with his medical care. States the first step is getting his place cleaned out and they are coming Saturday. Patient states has been elevating his legs and they are improving. Swelling has gone down. He will follow up with his PCP and see if he needs to be referred to other specialities. Patient continues to verbalize an improved mood and admits coming into the hospital was what he needed. Appears future oriented and optimistic. Patient denies any suicidal/homicidal ideations. Able to contract for safety. Denies any A/V hallucinations. No evidence of psychosis noted. Patient visible on the unit engaging with peers and attending groups. Anticipated discharge tomorrow 12/15/23. Compliant with medication. Does not warrant continued Inpatient hospitalization. Patient does not present a risk to self or others at this time.    Patient ambulates independently without a walker.      #Admit    #MDD  -Prozac 20mg daily--> 40mg daily 12/6/23  -Trazodone 100mg for insomnia    -Haldol 5mg Q8 PRN for agitation  -Lorazepam 2mg Q8 PRN for aggression    ##Tobacco use disorder  -Nicotine Gum PRN    -Pantoprazole  -Gabapentin  -Tylenol PRN

## 2023-12-14 NOTE — BH INPATIENT PSYCHIATRY PROGRESS NOTE - NSBHCHARTREVIEWVS_PSY_A_CORE FT
Vital Signs Last 24 Hrs  T(C): 35.9 (12-13-23 @ 16:10), Max: 35.9 (12-13-23 @ 16:10)  T(F): 96.7 (12-13-23 @ 16:10), Max: 96.7 (12-13-23 @ 16:10)  HR: 59 (12-14-23 @ 08:28) (54 - 59)  BP: 118/68 (12-14-23 @ 08:28) (102/58 - 118/68)  BP(mean): --  RR: 18 (12-14-23 @ 08:28) (18 - 19)  SpO2: --

## 2023-12-14 NOTE — BH INPATIENT PSYCHIATRY PROGRESS NOTE - NSBHATTESTBILLING_PSY_A_CORE
[id number] OBS or IP - moderate complexity OR 35-49 mins [id number] OBS or IP - moderate complexity OR 35-49 mins

## 2023-12-14 NOTE — BH INPATIENT PSYCHIATRY PROGRESS NOTE - NSBHFUPINTERVALHXFT_PSY_A_CORE
Patient seen and evaluated. As per nursing report no acute events. On approach patient calm and cooperative. No agitation or aggression noted.  Patient verbalizes knowing he needs to stick to the plan set up for outpatient. States he will follow up with his psychiatrist and therapist appointments. States he will make all his medical appointments when discharged and continue to follow up with his medical care. States the first step is getting his place cleaned out and they are coming Saturday. Patient states has been elevating his legs and they are improving. Swelling is going down. He will follow up with his PCP and see if he needs to be referred to other specialities. Patient continues to verbalize an improved mood and admits coming into the hospital was what he needed. Appears future oriented and optimistic. Patient denies any suicidal/homicidal ideations. Able to contract for safety. Denies any A/V hallucinations. No evidence of psychosis noted. Patient visible on the unit engaging with peers and attending groups. Anticipated discharge tomorrow 12/15/23. Compliant with medication. Does not warrant continued Inpatient hospitalization. Patient does not present a risk to self or others at this time.    PT recommended a walker due to bilateral neuropathy. Patient ambulates independently without a walker.   Patient seen and evaluated. As per nursing report no acute events. On approach patient calm and cooperative. No agitation or aggression noted.  Patient verbalizes knowing he needs to stick to the plan set up for outpatient. States he will follow up with his psychiatrist and therapist appointments. States he will make all his medical appointments when discharged and continue to follow up with his medical care. States the first step is getting his place cleaned out and they are coming Saturday. Patient states has been elevating his legs and they are improving. Swelling has gone down. He will follow up with his PCP and see if he needs to be referred to other specialities. Patient continues to verbalize an improved mood and admits coming into the hospital was what he needed. Appears future oriented and optimistic. Patient denies any suicidal/homicidal ideations. Able to contract for safety. Denies any A/V hallucinations. No evidence of psychosis noted. Patient visible on the unit engaging with peers and attending groups. Anticipated discharge tomorrow 12/15/23. Compliant with medication. Does not warrant continued Inpatient hospitalization. Patient does not present a risk to self or others at this time.    Patient ambulates independently without a walker.

## 2023-12-15 VITALS — TEMPERATURE: 97 F | SYSTOLIC BLOOD PRESSURE: 105 MMHG | HEART RATE: 66 BPM | DIASTOLIC BLOOD PRESSURE: 60 MMHG

## 2023-12-15 PROCEDURE — 99238 HOSP IP/OBS DSCHRG MGMT 30/<: CPT

## 2023-12-15 RX ADMIN — Medication 40 MILLIGRAM(S): at 08:16

## 2023-12-15 RX ADMIN — RIVAROXABAN 15 MILLIGRAM(S): KIT at 08:16

## 2023-12-15 RX ADMIN — GABAPENTIN 800 MILLIGRAM(S): 400 CAPSULE ORAL at 08:16

## 2023-12-15 RX ADMIN — PANTOPRAZOLE SODIUM 40 MILLIGRAM(S): 20 TABLET, DELAYED RELEASE ORAL at 06:35

## 2023-12-15 RX ADMIN — Medication 650 MILLIGRAM(S): at 09:19

## 2023-12-15 NOTE — BH INPATIENT PSYCHIATRY PROGRESS NOTE - NSTXSUICIDGOAL_PSY_ALL_CORE
Be able to state 3 reasons for living
Be able to report that they independently used a coping skill instead of hurting oneself
Be able to report that they independently used a coping skill instead of hurting oneself
Be able to state 3 reasons for living
Be able to report that they independently used a coping skill instead of hurting oneself
Be able to report that they independently used a coping skill instead of hurting oneself
Be able to state 3 reasons for living
Be able to report that they independently used a coping skill instead of hurting oneself
Be able to state 3 reasons for living
Be able to state 3 reasons for living

## 2023-12-15 NOTE — BH INPATIENT PSYCHIATRY PROGRESS NOTE - NSBHMSEIMPULSE_PSY_A_CORE
Normal
[facility] email [email or web address]
Normal

## 2023-12-15 NOTE — BH INPATIENT PSYCHIATRY PROGRESS NOTE - NSBHMSEKNOWHOW_PSY_ALL_CORE
Vocabulary

## 2023-12-15 NOTE — BH INPATIENT PSYCHIATRY PROGRESS NOTE - NSBHATTESTBILLONSITE_PSY_A_CORE
MARIUSZ to bill

## 2023-12-15 NOTE — BH INPATIENT PSYCHIATRY PROGRESS NOTE - PRN MEDS
MEDICATIONS  (PRN):  acetaminophen     Tablet .. 650 milliGRAM(s) Oral every 6 hours PRN Mild Pain (1 - 3), Moderate Pain (4 - 6)  haloperidol     Tablet 5 milliGRAM(s) Oral every 8 hours PRN agitation  lidocaine 2% Viscous 10 milliLiter(s) Swish and Spit every 8 hours PRN toothache  LORazepam     Tablet 2 milliGRAM(s) Oral every 8 hours PRN Aggression  nicotine  Polacrilex Gum 2 milliGRAM(s) Oral every 2 hours PRN withdrawals  polyethylene glycol 3350 17 Gram(s) Oral daily PRN Constipation  senna 2 Tablet(s) Oral at bedtime PRN constipation  
MEDICATIONS  (PRN):  acetaminophen     Tablet .. 650 milliGRAM(s) Oral every 6 hours PRN Mild Pain (1 - 3), Moderate Pain (4 - 6)  haloperidol     Tablet 5 milliGRAM(s) Oral every 8 hours PRN agitation  lidocaine 2% Viscous 10 milliLiter(s) Swish and Spit every 8 hours PRN toothache  LORazepam     Tablet 2 milliGRAM(s) Oral every 8 hours PRN Aggression  nicotine  Polacrilex Gum 2 milliGRAM(s) Oral every 2 hours PRN withdrawals  polyethylene glycol 3350 17 Gram(s) Oral daily PRN Constipation  senna 2 Tablet(s) Oral at bedtime PRN constipation  
MEDICATIONS  (PRN):  acetaminophen     Tablet .. 650 milliGRAM(s) Oral every 6 hours PRN Mild Pain (1 - 3), Moderate Pain (4 - 6)  haloperidol     Tablet 5 milliGRAM(s) Oral every 8 hours PRN agitation  LORazepam     Tablet 2 milliGRAM(s) Oral every 8 hours PRN Aggression  nicotine  Polacrilex Gum 2 milliGRAM(s) Oral every 2 hours PRN withdrawals  polyethylene glycol 3350 17 Gram(s) Oral daily PRN Constipation  
MEDICATIONS  (PRN):  acetaminophen     Tablet .. 650 milliGRAM(s) Oral every 6 hours PRN Mild Pain (1 - 3), Moderate Pain (4 - 6)  haloperidol     Tablet 5 milliGRAM(s) Oral every 8 hours PRN agitation  LORazepam     Tablet 2 milliGRAM(s) Oral every 8 hours PRN Aggression  nicotine  Polacrilex Gum 2 milliGRAM(s) Oral every 2 hours PRN withdrawals  
MEDICATIONS  (PRN):  acetaminophen     Tablet .. 650 milliGRAM(s) Oral every 6 hours PRN Mild Pain (1 - 3), Moderate Pain (4 - 6)  haloperidol     Tablet 5 milliGRAM(s) Oral every 8 hours PRN agitation  lidocaine 2% Viscous 10 milliLiter(s) Swish and Spit every 8 hours PRN toothache  LORazepam     Tablet 2 milliGRAM(s) Oral every 8 hours PRN Aggression  nicotine  Polacrilex Gum 2 milliGRAM(s) Oral every 2 hours PRN withdrawals  polyethylene glycol 3350 17 Gram(s) Oral daily PRN Constipation  senna 2 Tablet(s) Oral at bedtime PRN constipation  
MEDICATIONS  (PRN):  acetaminophen     Tablet .. 650 milliGRAM(s) Oral every 6 hours PRN Mild Pain (1 - 3), Moderate Pain (4 - 6)  haloperidol     Tablet 5 milliGRAM(s) Oral every 8 hours PRN agitation  LORazepam     Tablet 2 milliGRAM(s) Oral every 8 hours PRN Aggression  nicotine  Polacrilex Gum 2 milliGRAM(s) Oral every 2 hours PRN withdrawals  
MEDICATIONS  (PRN):  acetaminophen     Tablet .. 650 milliGRAM(s) Oral every 6 hours PRN Mild Pain (1 - 3), Moderate Pain (4 - 6)  haloperidol     Tablet 5 milliGRAM(s) Oral every 8 hours PRN agitation  lidocaine 2% Viscous 10 milliLiter(s) Swish and Spit every 8 hours PRN toothache  LORazepam     Tablet 2 milliGRAM(s) Oral every 8 hours PRN Aggression  nicotine  Polacrilex Gum 2 milliGRAM(s) Oral every 2 hours PRN withdrawals  polyethylene glycol 3350 17 Gram(s) Oral daily PRN Constipation  senna 2 Tablet(s) Oral at bedtime PRN constipation  
MEDICATIONS  (PRN):  acetaminophen     Tablet .. 650 milliGRAM(s) Oral every 6 hours PRN Mild Pain (1 - 3), Moderate Pain (4 - 6)  haloperidol     Tablet 5 milliGRAM(s) Oral every 8 hours PRN agitation  lidocaine 2% Viscous 10 milliLiter(s) Swish and Spit every 8 hours PRN toothache  LORazepam     Tablet 2 milliGRAM(s) Oral every 8 hours PRN Aggression  nicotine  Polacrilex Gum 2 milliGRAM(s) Oral every 2 hours PRN withdrawals  polyethylene glycol 3350 17 Gram(s) Oral daily PRN Constipation  senna 2 Tablet(s) Oral at bedtime PRN constipation  
MEDICATIONS  (PRN):  acetaminophen     Tablet .. 650 milliGRAM(s) Oral every 6 hours PRN Mild Pain (1 - 3), Moderate Pain (4 - 6)  haloperidol     Tablet 5 milliGRAM(s) Oral every 8 hours PRN agitation  LORazepam     Tablet 2 milliGRAM(s) Oral every 8 hours PRN Agitation  nicotine  Polacrilex Gum 2 milliGRAM(s) Oral every 2 hours PRN withdrawals  
MEDICATIONS  (PRN):  acetaminophen     Tablet .. 650 milliGRAM(s) Oral every 6 hours PRN Mild Pain (1 - 3), Moderate Pain (4 - 6)  haloperidol     Tablet 5 milliGRAM(s) Oral every 8 hours PRN agitation  LORazepam     Tablet 2 milliGRAM(s) Oral every 8 hours PRN Aggression  nicotine  Polacrilex Gum 2 milliGRAM(s) Oral every 2 hours PRN withdrawals

## 2023-12-15 NOTE — BH INPATIENT PSYCHIATRY PROGRESS NOTE - NSBHMSEMUSCLE_PSY_A_CORE
Normal muscle tone/strength
Normal muscle tone/strength
Unable to assess
Normal muscle tone/strength

## 2023-12-15 NOTE — BH INPATIENT PSYCHIATRY PROGRESS NOTE - NSBHMETABOLIC_PSY_ALL_CORE_FT
BMI: BMI (kg/m2): 38.9 (12-05-23 @ 11:48)  HbA1c: A1C with Estimated Average Glucose Result: 5.2 % (12-03-23 @ 08:35)    Glucose:   BP: 105/60 (12-15-23 @ 08:29) (94/55 - 118/68)Vital Signs Last 24 Hrs  T(C): 36.3 (12-15-23 @ 08:29), Max: 36.3 (12-15-23 @ 08:29)  T(F): 97.3 (12-15-23 @ 08:29), Max: 97.3 (12-15-23 @ 08:29)  HR: 66 (12-15-23 @ 08:29) (59 - 66)  BP: 105/60 (12-15-23 @ 08:29) (103/65 - 105/60)  BP(mean): --  RR: 16 (12-14-23 @ 15:46) (16 - 16)  SpO2: --      Lipid Panel: Date/Time: 12-03-23 @ 08:35  Cholesterol, Serum: 167  LDL Cholesterol Calculated: 115  HDL Cholesterol, Serum: 36  Total Cholesterol/HDL Ration Measurement: --  Triglycerides, Serum: 79

## 2023-12-15 NOTE — BH INPATIENT PSYCHIATRY PROGRESS NOTE - NSTXSUICIDDATETRGT_PSY_ALL_CORE
18-Dec-2023
11-Dec-2023
18-Dec-2023
11-Dec-2023
11-Dec-2023

## 2023-12-15 NOTE — BH INPATIENT PSYCHIATRY PROGRESS NOTE - NSBHMSEBODY_PSY_A_CORE
Average build
Overweight
Average build

## 2023-12-15 NOTE — BH INPATIENT PSYCHIATRY PROGRESS NOTE - NSBHFUPINTERVALCCFT_PSY_A_CORE
"My legs are swollen"
"None"
"My legs are better"
"They are coming to do my place on Saturday"
"None"
"Im ready"
"I want something for constipation"

## 2023-12-15 NOTE — BH INPATIENT PSYCHIATRY PROGRESS NOTE - NSBHMSEGAIT_PSY_A_CORE
Abnormal gait / station
Unable to assess
Abnormal gait / station

## 2023-12-15 NOTE — BH INPATIENT PSYCHIATRY PROGRESS NOTE - NSBHATTESTATTENDNAMEFT_PSY_A_CORE
Dr. Banegas
Dr. Starks
Dr. Banegas
Dr. Banegas
Dr. Starks
Dr. Banegas
Dr. Banegas
Dr. Starks

## 2023-12-15 NOTE — BH INPATIENT PSYCHIATRY PROGRESS NOTE - NSBHMSEMOOD_PSY_A_CORE
Depressed/Anxious
Depressed
Normal
Normal
Depressed/Anxious
Normal
Normal
Depressed/Anxious

## 2023-12-15 NOTE — BH INPATIENT PSYCHIATRY PROGRESS NOTE - NSICDXBHSECONDARYDX_PSY_ALL_CORE
Tobacco use disorder   F17.200  

## 2023-12-15 NOTE — BH INPATIENT PSYCHIATRY PROGRESS NOTE - NSBHMSEAFFCONG_PSY_A_CORE
Congruent
Non-congruent
Non-congruent
Congruent
Congruent
Non-congruent
Congruent

## 2023-12-15 NOTE — BH INPATIENT PSYCHIATRY PROGRESS NOTE - NSBHATTESTAPPBILLTIME_PSY_A_CORE
I attest my time as MARIUSZ is greater than 50% of the total combined time spent on qualifying patient care activities. I have reviewed and verified the documentation.

## 2023-12-15 NOTE — BH INPATIENT PSYCHIATRY PROGRESS NOTE - NSBHANTIPSYCHOTIC_PSY_ALL_CORE
No
Solaraze Pregnancy And Lactation Text: This medication is Pregnancy Category B and is considered safe. There is some data to suggest avoiding during the third trimester. It is unknown if this medication is excreted in breast milk.

## 2023-12-15 NOTE — BH INPATIENT PSYCHIATRY PROGRESS NOTE - NSBHASSESSSUMMFT_PSY_ALL_CORE
The patient is a 58-year-old male; ; domiciled alone; has 2 adult daughters; on disability/pension >10 years for injury while at work; self-reported PPHx of depression, denies prior ED visits or admissions, hx of SA; PMHx of renal cancer, back pain; BIB EMS; psychiatry consulted for SI.  Pt with worsening depression and decline in functioning over the past year, currently with SI and admitted for the same.     D/C today. Transportation set up. Meds sent to [facility] pharmacy as requested by patient. Patient appeared to be in good spirits today. Endorses a better mood. Insight and judgment have improved. Denies suicidal/ homicidal ideations. Patient able to contract for safety, stating the importance of compliance with medication and treatment. Patent reminded to contact PCP and to be compliant with his medical appointments. Denies any A/V hallucinations. No evidence of psychosis noted. Compliant with medication. Does not warrant continued Inpatient hospitalization. Writer reviewed D/C papers with patient. Patient verbalized understanding. Patient does not appear to be of risk to self or others at this time.    Patient ambulates independently without a walker.      #Admit    #MDD  -Prozac 20mg daily--> 40mg daily 12/6/23  -Trazodone 100mg for insomnia    -Haldol 5mg Q8 PRN for agitation  -Lorazepam 2mg Q8 PRN for aggression    ##Tobacco use disorder  -Nicotine Gum PRN    -Pantoprazole  -Gabapentin  -Tylenol PRN The patient is a 58-year-old male; ; domiciled alone; has 2 adult daughters; on disability/pension >10 years for injury while at work; self-reported PPHx of depression, denies prior ED visits or admissions, hx of SA; PMHx of renal cancer, back pain; BIB EMS; psychiatry consulted for SI.  Pt with worsening depression and decline in functioning over the past year, currently with SI and admitted for the same.     D/C today. Transportation set up. Meds sent to [facility] pharmacy as requested by patient. Patient appeared to be in good spirits today. Endorses a better mood. Insight and judgment have improved. Denies suicidal/ homicidal ideations. Patient able to contract for safety, stating the importance of compliance with medication and treatment. Patent reminded to contact PCP and to be compliant with his medical appointments. Denies any A/V hallucinations. No evidence of psychosis noted. Compliant with medication. Does not warrant continued Inpatient hospitalization. Writer reviewed D/C papers with patient. Patient verbalized understanding. Patient does not appear to be of risk to self or others at this time.    Patient ambulates independently without a walker.      #Admit    #MDD  -Prozac 20mg daily--> 40mg daily 12/6/23  -Trazodone 100mg for insomnia    -Haldol 5mg Q8 PRN for agitation  -Lorazepam 2mg Q8 PRN for aggression    ##Tobacco use disorder  -Nicotine Gum PRN    -Pantoprazole  -Gabapentin  -Tylenol PRN The patient is a 58-year-old male; ; domiciled alone; has 2 adult daughters; on disability/pension >10 years for injury while at work; self-reported PPHx of depression, denies prior ED visits or admissions, hx of SA; PMHx of renal cancer, back pain; BIB EMS; psychiatry consulted for SI.  Pt with worsening depression and decline in functioning over the past year, currently with SI and admitted for the same.     D/C today. Transportation set up. Meds sent to [facility] pharmacy as requested by patient. Patient appeared to be in good spirits today. Endorses a better mood. Insight and judgment have improved. Denies suicidal/ homicidal ideations. Patient able to contract for safety, stating the importance of compliance with medication and treatment. Patent reminded to contact PCP and to be compliant with his medical appointments. Patient verbalized understanding and states he will call Dr. Laguna as soon as he gets home. Denies any A/V hallucinations. No evidence of psychosis noted. Compliant with medication. Does not warrant continued Inpatient hospitalization. Writer reviewed D/C papers with patient. Patient verbalized understanding. Patient does not appear to be of risk to self or others at this time.    Patient ambulates independently without a walker.    #Admit    #MDD  -Prozac 20mg daily--> 40mg daily 12/6/23  -Trazodone 100mg for insomnia    -Haldol 5mg Q8 PRN for agitation  -Lorazepam 2mg Q8 PRN for aggression    ##Tobacco use disorder  -Nicotine Gum PRN    -Pantoprazole  -Gabapentin  -Tylenol PRN The patient is a 58-year-old male; ; domiciled alone; has 2 adult daughters; on disability/pension >10 years for injury while at work; self-reported PPHx of depression, denies prior ED visits or admissions, hx of SA; PMHx of renal cancer, back pain; BIB EMS; psychiatry consulted for SI.  Pt with worsening depression and decline in functioning over the past year, currently with SI and admitted for the same.     D/C today. Transportation set up. Meds sent to [facility] pharmacy as requested by patient. Patient appeared to be in good spirits today. Endorses a better mood. Insight and judgment have improved. Denies suicidal/ homicidal ideations. Patient able to contract for safety, stating the importance of compliance with medication and treatment. Patent reminded to contact PCP and to be compliant with his medical appointments. Patient verbalized understanding and states he will call Dr. Laguna as soon as he gets home. Denies any A/V hallucinations. No evidence of psychosis noted. Compliant with medication. Does not warrant continued Inpatient hospitalization. Writer reviewed D/C papers with patient. Patient verbalized understanding. Patient does not appear to be of risk to self or others at this time.    Patient ambulates independently without a walker.    #Admit    #MDD  -Prozac 20mg daily--> 40mg daily 12/6/23  -Trazodone 100mg for insomnia    -Haldol 5mg Q8 PRN for agitation  -Lorazepam 2mg Q8 PRN for aggression    ##Tobacco use disorder  -Nicotine Gum PRN    -Pantoprazole  -Gabapentin  -Tylenol PRN

## 2023-12-15 NOTE — BH INPATIENT PSYCHIATRY PROGRESS NOTE - NSTXSUICIDPROGRES_PSY_ALL_CORE
Met - goal discontinued
Improving
Met - goal discontinued
Met - goal discontinued
Improving
Met - goal discontinued
Met - goal discontinued
Improving

## 2023-12-15 NOTE — BH INPATIENT PSYCHIATRY PROGRESS NOTE - NSCGISEVERILLNESS_PSY_ALL_CORE
5 = Markedly ill - intrusive symptoms that distinctly impair social/occupational function or cause intrusive levels of distress
3 = Mildly ill – clearly established symptoms with minimal, if any, distress or difficulty in social and occupational function
5 = Markedly ill - intrusive symptoms that distinctly impair social/occupational function or cause intrusive levels of distress
3 = Mildly ill – clearly established symptoms with minimal, if any, distress or difficulty in social and occupational function
5 = Markedly ill - intrusive symptoms that distinctly impair social/occupational function or cause intrusive levels of distress
5 = Markedly ill - intrusive symptoms that distinctly impair social/occupational function or cause intrusive levels of distress

## 2023-12-15 NOTE — BH INPATIENT PSYCHIATRY PROGRESS NOTE - NSTXSUICIDDATEEST_PSY_ALL_CORE
02-Dec-2023

## 2023-12-15 NOTE — BH INPATIENT PSYCHIATRY PROGRESS NOTE - NSTXDEPRESDATETRGT_PSY_ALL_CORE
18-Dec-2023
11-Dec-2023
11-Dec-2023
18-Dec-2023
11-Dec-2023
18-Dec-2023
11-Dec-2023
18-Dec-2023
18-Dec-2023
11-Dec-2023

## 2023-12-15 NOTE — BH INPATIENT PSYCHIATRY PROGRESS NOTE - NSTXDEPRESPROGRES_PSY_ALL_CORE
Improving
Met - goal discontinued
Improving
Improving
Met - goal discontinued

## 2023-12-15 NOTE — BH INPATIENT PSYCHIATRY PROGRESS NOTE - NSBHATTESTBILLING_PSY_A_CORE
[id number] OBS or IP - moderate complexity OR 35-49 mins [id number] OBS or IP - moderate complexity OR 35-49 mins

## 2023-12-15 NOTE — BH INPATIENT PSYCHIATRY PROGRESS NOTE - NSBHMSETHTCONTENT_PSY_A_CORE
Suicidality
Unremarkable

## 2023-12-15 NOTE — BH INPATIENT PSYCHIATRY PROGRESS NOTE - NSTXDEPRESDATEEST_PSY_ALL_CORE
04-Dec-2023

## 2023-12-15 NOTE — BH INPATIENT PSYCHIATRY PROGRESS NOTE - NSDCCRITERIA_PSY_ALL_CORE
Patient will begin treatment and have resolution of SI and improvement of depressive sx

## 2023-12-15 NOTE — BH INPATIENT PSYCHIATRY PROGRESS NOTE - NSBHFUPINTERVALHXFT_PSY_A_CORE
D/C today. Transportation set up. Meds sent to [facility] pharmacy as requested by patient. Patient appeared to be in good spirits today. Endorses a better mood. Insight and judgment have improved. Denies suicidal/ homicidal ideations. Patient able to contract for safety, stating the importance of compliance with medication and treatment. Patent reminded to contact PCP and to be compliant with his medical appointments. Denies any A/V hallucinations. No evidence of psychosis noted. Compliant with medication. Does not warrant continued Inpatient hospitalization. Writer reviewed D/C papers with patient. Patient verbalized understanding. Patient does not appear to be of risk to self or others at this time.    Patient ambulates independently without a walker. D/C today. Transportation set up. Meds sent to [facility] pharmacy as requested by patient. Patient appeared to be in good spirits today. Endorses a better mood. Insight and judgment have improved. Denies suicidal/ homicidal ideations. Patient able to contract for safety, stating the importance of compliance with medication and treatment. Patent reminded to contact PCP and to be compliant with his medical appointments. Denies any A/V hallucinations. No evidence of psychosis noted. Compliant with medication. Does not warrant continued Inpatient hospitalization. Writer reviewed D/C papers with patient. Patient verbalized understanding. Patient does not appear to be of risk to self or others at this time.    Patient ambulates independently without a walker. D/C today. Transportation set up. Meds sent to [facility] pharmacy as requested by patient. Patient appeared to be in good spirits today. Endorses a better mood. Insight and judgment have improved. Denies suicidal/ homicidal ideations. Patient able to contract for safety, stating the importance of compliance with medication and treatment. Patent reminded to contact PCP and to be compliant with his medical appointments. Patient verbalized understanding and states he will call Dr. Laguna as soon as he gets home. Denies any A/V hallucinations. No evidence of psychosis noted. Compliant with medication. Does not warrant continued Inpatient hospitalization. Writer reviewed D/C papers with patient. Patient verbalized understanding. Patient does not appear to be of risk to self or others at this time.    Patient ambulates independently without a walker. D/C today. Transportation set up. Meds sent to [facility] pharmacy as requested by patient. Patient appeared to be in good spirits today. Endorses a better mood. Insight and judgment have improved. Denies suicidal/ homicidal ideations. Patient able to contract for safety, stating the importance of compliance with medication and treatment. Patent reminded to contact PCP and to be compliant with his medical appointments. Patient verbalized understanding and states he will call Dr. Laguna as soon as he gets home. Denies any A/V hallucinations. No evidence of psychosis noted. Compliant with medication. Does not warrant continued Inpatient hospitalization. Writer reviewed D/C papers with patient. Patient verbalized understanding. Patient does not appear to be of risk to self or others at this time.    Patient ambulates independently without a walker.

## 2023-12-15 NOTE — BH INPATIENT PSYCHIATRY PROGRESS NOTE - NSTXDEPRESINTERMD_PSY_ALL_CORE
Medication management and milieu therapy

## 2023-12-15 NOTE — BH INPATIENT PSYCHIATRY PROGRESS NOTE - NSBHCHARTREVIEWVS_PSY_A_CORE FT
Vital Signs Last 24 Hrs  T(C): 36.3 (12-15-23 @ 08:29), Max: 36.3 (12-15-23 @ 08:29)  T(F): 97.3 (12-15-23 @ 08:29), Max: 97.3 (12-15-23 @ 08:29)  HR: 66 (12-15-23 @ 08:29) (59 - 66)  BP: 105/60 (12-15-23 @ 08:29) (103/65 - 105/60)  BP(mean): --  RR: 16 (12-14-23 @ 15:46) (16 - 16)  SpO2: --

## 2023-12-15 NOTE — BH INPATIENT PSYCHIATRY PROGRESS NOTE - NSBHATTESTTYPEVISIT_PSY_A_CORE
On-site Attending supervising MARIUSZ (99XXX codes)

## 2023-12-19 ENCOUNTER — NON-APPOINTMENT (OUTPATIENT)
Age: 58
End: 2023-12-19

## 2023-12-19 DIAGNOSIS — F32.9 MAJOR DEPRESSIVE DISORDER, SINGLE EPISODE, UNSPECIFIED: ICD-10-CM

## 2023-12-19 DIAGNOSIS — R45.851 SUICIDAL IDEATIONS: ICD-10-CM

## 2023-12-19 DIAGNOSIS — F17.200 NICOTINE DEPENDENCE, UNSPECIFIED, UNCOMPLICATED: ICD-10-CM

## 2023-12-19 DIAGNOSIS — Z86.718 PERSONAL HISTORY OF OTHER VENOUS THROMBOSIS AND EMBOLISM: ICD-10-CM

## 2023-12-19 DIAGNOSIS — Z85.528 PERSONAL HISTORY OF OTHER MALIGNANT NEOPLASM OF KIDNEY: ICD-10-CM

## 2023-12-19 PROBLEM — Z00.00 ENCOUNTER FOR PREVENTIVE HEALTH EXAMINATION: Status: ACTIVE | Noted: 2023-12-19

## 2023-12-20 ENCOUNTER — APPOINTMENT (OUTPATIENT)
Dept: PSYCHIATRY | Facility: CLINIC | Age: 58
End: 2023-12-20

## 2023-12-20 ENCOUNTER — OUTPATIENT (OUTPATIENT)
Dept: OUTPATIENT SERVICES | Facility: HOSPITAL | Age: 58
LOS: 1 days | End: 2023-12-20
Payer: MEDICARE

## 2023-12-20 DIAGNOSIS — F32.9 MAJOR DEPRESSIVE DISORDER, SINGLE EPISODE, UNSPECIFIED: ICD-10-CM

## 2023-12-20 DIAGNOSIS — Z90.5 ACQUIRED ABSENCE OF KIDNEY: Chronic | ICD-10-CM

## 2023-12-20 PROCEDURE — 90791 PSYCH DIAGNOSTIC EVALUATION: CPT

## 2023-12-20 NOTE — BH SOCIAL WORK CONFIRMATION FOLLOW UP NOTE - NSLINKEDTOLOC_PSY_ALL_CORE
Semaj attended his outpatient appointment with Anna at [facility] as scheduled, . Semaj attended his outpatient appointment with Anna at [facility] as scheduled, .

## 2023-12-21 DIAGNOSIS — F32.9 MAJOR DEPRESSIVE DISORDER, SINGLE EPISODE, UNSPECIFIED: ICD-10-CM

## 2023-12-27 ENCOUNTER — APPOINTMENT (OUTPATIENT)
Dept: PSYCHIATRY | Facility: CLINIC | Age: 58
End: 2023-12-27
Payer: MEDICARE

## 2023-12-27 ENCOUNTER — OUTPATIENT (OUTPATIENT)
Dept: OUTPATIENT SERVICES | Facility: HOSPITAL | Age: 58
LOS: 1 days | End: 2023-12-27
Payer: MEDICARE

## 2023-12-27 VITALS
HEIGHT: 68 IN | DIASTOLIC BLOOD PRESSURE: 74 MMHG | BODY MASS INDEX: 34.1 KG/M2 | WEIGHT: 225 LBS | RESPIRATION RATE: 18 BRPM | SYSTOLIC BLOOD PRESSURE: 116 MMHG | HEART RATE: 76 BPM

## 2023-12-27 DIAGNOSIS — F32.9 MAJOR DEPRESSIVE DISORDER, SINGLE EPISODE, UNSPECIFIED: ICD-10-CM

## 2023-12-27 DIAGNOSIS — Z90.5 ACQUIRED ABSENCE OF KIDNEY: Chronic | ICD-10-CM

## 2023-12-27 PROCEDURE — 90792 PSYCH DIAG EVAL W/MED SRVCS: CPT

## 2023-12-27 PROCEDURE — 99205 OFFICE O/P NEW HI 60 MIN: CPT

## 2023-12-27 NOTE — RISK ASSESSMENT
[Yes, patient reports ideation or behavior] : Yes, patient reports ideation or behavior [FreeTextEntry8] : Patient denies any firearm access. He denies current suicial ideation. He reports understanding that he may contact 911, [phone], or this writer's number if he has safety concerns, and may present to the ED if feeling unsafe.    Patient has a number of risk factors for suicide, including being male, age > 45, , having depressed mood and suicidal thoughts, having chronic pain. His protective factors include being help seeking, now engaging in mental health treatment, having no previous suicide attempts. [Low acute suicide risk] : Low acute suicide risk [Yes] : Yes

## 2023-12-27 NOTE — DISCUSSION/SUMMARY
[Denied] : Denied [No] : No [Yes: Details:___] : Yes: [unfilled] [Advised to schedule] : Advised to schedule [No, advised to schedule] : No, advised to schedule [Does patient use tobacco products?] : Patient does not use tobacco products [Does patient use medical marijuana?] : Patient does not use medical marijuana. [Patient has been tested for HIV?] : Patient has been tested for HIV [Patient has been tested for Hepatitis?] : Patient has been tested for hepatitis [Patient would like to be tested/re-tested?] : patient would not like to be tested/re-tested [Negative] : Negative [Unknown] : Unknown [Current or past COVID-19 diagnosis?] : Patient does not have a current or past COVID-19 diagnosis [Vaccinated?] : is vaccinated [Education provided about COVID-19?] : Education provided about COVID-19 [FreeTextEntry9] : recently completed in [facility] [de-identified] : recently completed in [facility] [de-identified] : Patient had rroutine blood work in [facility] [de-identified] : unknown [de-identified] : Semaj was seen today for a Health Screening Assessment. He was calm and cooperative. Semaj denied any medical concerns at this time. Denies any PMH except for and "old back injury". He reports being on "pain medication in the past but at one point I just quit cold turkey it wasn't helping me". He reports he takes Tylenol for pain now. Semaj reports poor sleep and appetite for past 4 days as well as insomnia. Reminded him to share this with Dr. Sherwood. He has an appointment for a physcial with Dr. Trivedi. This writer gave card with number should he need to reach out.

## 2023-12-28 DIAGNOSIS — F32.9 MAJOR DEPRESSIVE DISORDER, SINGLE EPISODE, UNSPECIFIED: ICD-10-CM

## 2023-12-28 NOTE — REASON FOR VISIT
[Psychiatric Inpatient] : Psychiatric Inpatient [[facility] Provider/Facility] : [facility] Provider/Facility [Patient] : Patient [Prior Medical Records] : Prior Medical Records [FreeTextEntry2] : Intake appointment following inpatient psychiatric hospitalization from 12/2/2023-12/15/2023 [FreeTextEntry1] : "I became more depressed."

## 2023-12-28 NOTE — SOCIAL HISTORY
INSTRUCTIONS FOR COVID-19 OR ANY OTHER INFECTIOUS RESPIRATORY ILLNESSES    The Centers for Disease Control and Prevention (CDC) states that early indications for COVID-19 include cough, shortness of breath, difficulty breathing, or at least two of the following symptoms: chills, shaking with chills, muscle pain, headache, sore throat, and loss of taste or smell. Symptoms can range from mild to severe and may appear up to two weeks after exposure to the virus.    The practice of self-isolation and quarantine helps protect the public and your family by  preventing exposure to people who have or may have a contagious disease. Please follow the prevention steps below as based on CDC guidelines:    WHEN TO STOP ISOLATION: Persons with COVID-19 or any other infectious respiratory illness who have symptoms and were advised to care for themselves at home may discontinue home isolation under the following conditions:  · At least 24 hours have passed since recovery defined as resolution of fever without the use of fever-reducing medications; AND,  · Improvement in respiratory symptoms (e.g., cough, shortness of breath); AND,  · At least 10 days have passed since symptoms first appeared and have had no subsequent illness.    MONITOR YOUR SYMPTOMS: If your illness is worsening, seek prompt medical attention. If you have a medical emergency and need to call 911, notify the dispatch personnel that you have, or are being evaluated for confirmed or suspected COVID-19 or another infectious respiratory illness. Wear a facemask if possible.    ACTIVITY RESTRICTION: restrict activities outside your home, except for getting medical care. Do not go to work, school, or public areas. Avoid using public transportation, ride-sharing, or taxis.    SCHEDULED MEDICAL APPOINTMENTS: Notify your provider that you have, or are being evaluated for, confirmed or suspected COVID-19 or another infectious respiratory. This will help the healthcare  provider’s office safely take care of you and keep other people from getting exposed or infected.    FACEMASKS, when to wear: Anytime you are away from your home or around other people or pets. If you are unable to wear one, maintain a minimum of 6 feet distancing from others.    LIVING ENVIRONMENT: Stay in a separate room from other people and pets. If possible, use a separate bathroom, have someone else care for your pets and avoid sharing household items. Any items used should be washed thoroughly with soap and water. Clean all “high-touch” surfaces every day. Use a household cleaning spray or wipe, according to the label instructions. High touch surfaces include (but are not limited to) counters, tabletops, doorknobs, bathroom fixtures, toilets, phones, keyboards, tablets, and bedside tables.     HAND WASHING: Frequently wash hands with soap and water for at least 20 seconds,  especially after blowing your nose, coughing, or sneezing; going to the bathroom; before and after interacting with pets; and before and after eating or preparing food. If hands are visibly dirty use soap and water. If soap and water are not available, use an alcohol-based hand  with at least 60% alcohol. Avoid touching your eyes, nose, and mouth with unwashed hands. Cover your coughs and sneezes with a tissue. Throw used tissues in a lined trash can. Immediately wash your hands.    ACTIVE/FACILITATED SELF-MONITORING: Follow instructions provided by your local health department or health professionals, as appropriate. When working with your local health department check their available hours.    [facility]   Phone Number   [facility] [phone]   [facility] [place] [phone]   [facility] [phone]   [place] [phone]     IF YOU HAVE CONFIRMED POSITIVE COVID-19:    Those who have completely recovered from COVID-19 may have immune-boosting antibodies in their plasma--called “convalescent plasma”--that could be  used to treat critically ill COVID19 patients.    [facility] is excited to begin working with [facility] on collecting convalescent plasma from  people who have recovered from COVID-19 as part of a program to treat patients infected with the virus. This FDA-approved “emergency investigational new drug” is a special blood product containing antibodies that may give patients an extra boost to fight the virus.    To be eligible to donate convalescent plasma, you must have a prior COVID-19 diagnosis documented by a laboratory test (or a positive test result for SARS-CoV-2 antibodies) and meet additional eligibility requirements.    If you are interested in donating convalescent plasma or have any additional questions, please contact the [facility] Convalescent Plasma  at [phone] or via e-mail at [email or web address].   [FreeTextEntry1] : See HPI

## 2023-12-28 NOTE — REASON FOR VISIT
[Psychiatric Inpatient] : Psychiatric Inpatient [[facility] Provider/Facility] : [facility] Provider/Facility [Patient] : Patient [Prior Medical Records] : Prior Medical Records [FreeTextEntry2] : Intake appointment following inpatient psychiatric hospitalization from 12/2/2023-12/15/2023 [FreeTextEntry1] : "I became more depressed."

## 2023-12-28 NOTE — PSYCHOSOCIAL ASSESSMENT
[Yes, during lifetime] : Yes, during lifetime [_____] : Route: [unfilled] [FreeTextEntry1] : Patient states following his work accident in 2009 he started following with a pain management doctor and was started on liquid oxycodone which he was taking daily for about 10 years and stopped on his own 3 years ago. States when he first started with pain management he was on oxycodone tablets, but found himself calling other patients in the clinic and taking additional medication than prescribed. Patient reports he switched to liquid oxycodone and stopped taking non-prescribed medication. Regarding substance use, he stated he smoked 1 pack per day of cigarettes for 20 years. Denies any cannabis, alcohol, heroin, or other illicit substance use.

## 2023-12-28 NOTE — PHYSICAL EXAM
[Average] : average [Cooperative] : cooperative [Depressed] : depressed [Full] : full [Clear] : clear [Linear/Goal Directed] : linear/goal directed [None] : none [WNL] : within normal limits [FreeTextEntry8] : "Okay"

## 2023-12-28 NOTE — PAST MEDICAL HISTORY
[FreeTextEntry1] :  PMH of scoliosis, chronic back pain, renal cancer Current medical medications are Xarelto 20 mg qdaily due to history of DVTR and Gabapentin 800 mg BID for neuropathic pain PCP is Dr. Olmstead on [facility].

## 2023-12-28 NOTE — HISTORY OF PRESENT ILLNESS
[FreeTextEntry1] : Semaj French is a 58-year-old male, domiciled alone in a private residence,  for about 15 years, has two adult daughters (23y/o, 23y/o), retired [employer] worker following an accident in 2009, on SSI and pension for financial support, PMH of scoliosis, chronic back pain, renal cancer, past psychiatric history of depression, was following with an outpatient psychiatrist starting 15 years ago but stopped about 2 years ago, medication trials of Prozac 40 mg and short-course of augmenting with Abilify, no history of suicide attempts, 1 prior inpatient psychiatric admission in December 2023, no formal past substance use history, presents to the clinic for intake appointment following discharge from [facility].  Throughout the interview, patient was calm, cooperative, linear in thought process, answered questions appropriately. He reports his daughter called for a wellness check after he had not answered his phone and that police found the patient at his apartment, which was unkept. Patient reports for the last 1.5-2 years he has been essentially isolating at home and not seeing anyone, has felt very depressed, had low interest and motivation to see people or engage in self-care (e.g., allowed his hair to grow long, did not do laundry, stopped taking Prozac, low motivation to drive to [place] to fill out paperwork for his pension), low energy, low appetite, difficulty sleeping, and intermittent suicidal ideation with specific thoughts (e.g., "I have thought about different ways, like jumping from a bridge") but no intention, citing he would never do anything due to his daughters. Patient states the main trigger from 1.5-2 years ago was that his next-door neighbor which was the only person he was seeing had moved away, so patient felt isolated and grew more depressed.  Patient reports he first started a seeing a psychiatrist around 15 years ago due to depressed mood in the setting of marital issues and stopping working due to an accident at work where he was hit by a pole that caused severe back pain (patient denies ever having surgeries). States he was started on Prozac and was titrated to 40 mg qdaily and was also briefly on Abilify to augment Prozac (does not remember duration or dose of treatment). States he was taking Prozac until about 2 years ago. Reports it helped his depression. Patient denies any prior suicide attempts. When asked about the supposed suicide attempt documented in the EMR that lists patient attempted suicide by overdose, patient reports what actually happened was that he took his prescribed liquid oxycodone and was afraid he had taken too much so he gave himself narcan, called EMS, and explained the situation to them. He adamantly denies ever attempting suicide. He denies current suicidal ideation. Denies ever experiencing symptoms consistent with hamlet/hypomania, denies obsessive thoughts or compuslive behaviors (e.g., contamination, checking, washing) consistent with OCD. Denies visual/auditory hallucinations.   Patient states following his work accident in 2009 he started following with a pain management doctor and was started on liquid oxycodone which he was taking daily for about 10 years and stopped on his own 3 years ago. States when he first started with pain management he was on oxycodone tablets, but found himself calling other patients in the clinic and taking additional medication than prescribed. Patient reports he switched to liquid oxycodone and stopped taking non-prescribed medication. Regarding substance use, he stated he smoked 1 pack per day of cigarettes for 20 years. Denies any cannabis, alcohol, heroin, or other illicit substance use.   Patient states on the inpatient psychiatric unit he was restarted on Prozac and titrated to 40 mg qdaily. States was also started on Trazodone and titrated to 100 mg qHS for insomnia and Gabapentin 800 mg BID from neurology for neuropathic pain of his right leg. States while he has felt his depressed mood improve slightly with Prozac, he still feels depressed, still has intermittent suicidal thoughts with no intention, low motivation, guilt, low energy, difficulty sleeping. States he stopped taking Trazodone 7 days ago because he did not feel like it helped him. Patient states he would be agreeable to try a different medication. Agreeable to cross-titrate to reduce Prozac and start Zoloft. Potential side-effects of Zoloft, including weight gain, sexual dysfunction, hyponatremia, worsening depressed mood/suicidal ideation namely in those less than 26 y/o were explained to patient.   Patient denies any firearm access. He denies current suicial ideation. He reports understanding that he may contact [phone], or this writer's number if he has safety concerns, and may present to the ED if feeling unsafe.  [FreeTextEntry2] : Patient reports he first started a seeing a psychiatrist around 15 years ago due to depressed mood in the setting of marital issues and stopping working due to an accident at work where he was hit by a pole that caused severe back pain (patient denies ever having surgeries). States he was started on Prozac and was titrated to 40 mg qdaily and was also briefly on Abilify to augment Prozac (does not remember duration or dose of treatment). States he was taking Prozac until about 2 years ago. Reports it helped his depression. Patient denies any prior suicide attempts.  [FreeTextEntry3] : States he was started on Prozac about 15 years ago and was titrated to 40 mg qdaily until about 2 years ago, when he stopped the medication due to low motivation to take it because he felt depressed (see HPI). Says was also briefly on Abilify to augment Prozac (does not remember duration or dose of treatment). Reports was started on Trazodone while at [facility] and titrated to 100 mg qHS but stopped taking the medication about 1 week after discharge because it did not help with sleep.

## 2023-12-28 NOTE — PAST MEDICAL HISTORY
[FreeTextEntry1] :  PMH of scoliosis, chronic back pain, renal cancer Current medical medications are Xarelto 20 mg qdaily due to history of DVTR and Gabapentin 800 mg BID for neuropathic pain PCP is Dr. Olmstead on [facility].

## 2023-12-28 NOTE — END OF VISIT
[] : Resident [FreeTextEntry3] : Pt is seen and evaluated with resident and the treatment plan is discussed. Agree with the written above. Denies SI/HI/AH/PI. Pt is not in imminent  risk of hurting self at this time

## 2023-12-28 NOTE — DISCUSSION/SUMMARY
[Initial Plan] : Initial Plan [Able to exercise self-direction] : able to exercise self-direction [Able to set and pursue goals] : able to set and pursue goals [Adherent to treatment recommendations] : adherent to treatment recommendations [Articulate] : articulate [Attempting to realize their potential] : Attempting to realize their potential [Cognitively intact] : cognitively intact [Insightful] : insightful [Intelligent] : intelligent [Motivated to participate in treatment] : motivated to participate in treatment [Motivated and ready for change] : motivated and ready for change [Health literacy] : health literacy [Housing stability] : housing stability [English fluency] : English fluency [FreeTextEntry3] : 12/27/2023 [FreeTextEntry5] : To improve depressed mood, start being better able to function, and be less socially isolated [FreeTextEntry1] : Semaj French is a 58-year-old male, domiciled alone in a private residence,  for about 15 years, has two adult daughters (25y/o, 21y/o), retired [employer] worker following an accident in 2009, on SSI and pension for financial support, PMH of scoliosis, chronic back pain, renal cancer, past psychiatric history of depression, was following with an outpatient psychiatrist starting 15 years ago but stopped about 2 years ago, medication trials of Prozac 40 mg and short-course of augmenting with Abilify, no history of suicide attempts, 1 prior inpatient psychiatric admission in December 2023, no formal past substance use history, presents to the clinic for intake appointment following discharge from [facility].  On psychiatric evaluation, patient demonstrates symptoms consistent with a major depressive disorder - depressed most of the day, nearly every day, loss of interest and motivation, low appetite, difficulty sleeping, fatigue, feelings of guilt, and suicidal ideation. These symptoms have lasted for a two-week period, and patient endorses that his depressed mood initially worsened around 1.5-2 years ago due to feelings of isolation after his neighbor moved. There are also a number of additional psychosocial stressors, including divorce 15 years ago, ongoing social isolation, some conflict with his older daughter, chronic back pain, not working for about the last 15 years, and stopping Prozac about 2 years ago. Patient feels as though his depression has not improved since starting Prozac and being at 40 mg while at the inpatient psychiatric unit; he also endorses ongoing suicidal ideation with no intention. At this time, patient may benefit from trying an alternative SSRI for depression. Plan will be to cross-taper to initiate Zoloft and taper off Prozac. In addition, will also continue psychotherapy at the clinic. Patient is not acutely suicidal.  Patient has a number of risk factors for suicide, including being male, age > 45, , having depressed mood and suicidal thoughts, having chronic pain. His protective factors include being help seeking, now engaging in mental health treatment, having no previous suicide attempts.   Plan: - Cross-taper to start Zoloft and taper off Prozac ---Take Zoloft 25 mg qdaily for 3 days. If tolerating, may increase to 50 mg qdaily. ---Reduce Prozac to 20 mg qdaily (from 40 mg qdaily) with plan to taper as tolerated - Discontinuing Trazodone as patient reports not taking for the last 7 days and states it did not help with insomnia - Continue therapy with Anna - Obtain additional history at the next visit (including family psychiatric history) - Follow up in 2 weeks

## 2023-12-28 NOTE — PLAN
[FreeTextEntry4] : Plan: - Cross-taper to start Zoloft and taper off Prozac ---Take Zoloft 25 mg qdaily for 3 days. If tolerating, may increase to 50 mg qdaily. ---Reduce Prozac to 20 mg qdaily (from 40 mg qdaily) with plan to taper as tolerated - Discontinuing Trazodone as patient reports not taking for the last 7 days and states it did not help with insomnia - Continue therapy with Anna - Obtain additional history at the next visit (including family psychiatric history) - Follow up in 2 weeks
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
## 2024-01-05 ENCOUNTER — OUTPATIENT (OUTPATIENT)
Dept: OUTPATIENT SERVICES | Facility: HOSPITAL | Age: 59
LOS: 1 days | End: 2024-01-05
Payer: MEDICARE

## 2024-01-05 ENCOUNTER — APPOINTMENT (OUTPATIENT)
Dept: PSYCHIATRY | Facility: CLINIC | Age: 59
End: 2024-01-05

## 2024-01-05 DIAGNOSIS — Z90.5 ACQUIRED ABSENCE OF KIDNEY: Chronic | ICD-10-CM

## 2024-01-05 DIAGNOSIS — F32.9 MAJOR DEPRESSIVE DISORDER, SINGLE EPISODE, UNSPECIFIED: ICD-10-CM

## 2024-01-05 PROCEDURE — 90837 PSYTX W PT 60 MINUTES: CPT

## 2024-01-05 NOTE — PLAN
[[facility]] : [facility]  [Motivational Interviewing] : Motivational Interviewing  [Supportive Therapy] : Supportive Therapy [FreeTextEntry2] : New patient - no active treatment plan at this time.  [de-identified] : Semaj attended today's session to complete the SW intake/psychosocial assessment. The patient reported significant improved mood since the d/c. He denied active s/h/i but continued to experience difficulty with sleep. The therapist supported the patient in openly discussing his concerning symptoms and recommended to share the issues with the psychiatrist. He denied imminent safety concerns. We completed the assessment today.   Plan/Practice Task(s): - Monitor symptoms - Attend psych evaluation as scheduled  Skills Training:  - None at this time  Recommended services & referrals made: - None at this time  Support Network (established contact consent/PHI Release): - Susan French, Daughter - Elgin French, Brother  Follow-up: - Return in 2 week(s)

## 2024-01-05 NOTE — REASON FOR VISIT
[Patient] : Patient [FreeTextEntry4] : 11:15 AM [FreeTextEntry5] : 12:08 PM [FreeTextEntry1] : SW Intake/Psychosocial Assessment

## 2024-01-06 DIAGNOSIS — F32.9 MAJOR DEPRESSIVE DISORDER, SINGLE EPISODE, UNSPECIFIED: ICD-10-CM

## 2024-01-08 NOTE — BH INPATIENT PSYCHIATRY ASSESSMENT NOTE - LEVEL OF CONSCIOUSNESS
Patient had emesis after given the oral Zofran.  Patient wants to wait to see if he can drink something after before doing an IV.  Mom in room with patient.    Alert

## 2024-01-12 ENCOUNTER — APPOINTMENT (OUTPATIENT)
Dept: PSYCHIATRY | Facility: CLINIC | Age: 59
End: 2024-01-12

## 2024-01-12 RX ORDER — FLUOXETINE HYDROCHLORIDE 20 MG/1
20 CAPSULE ORAL
Qty: 10 | Refills: 0 | Status: ACTIVE | COMMUNITY
Start: 2023-12-27 | End: 1900-01-01

## 2024-01-12 RX ORDER — SERTRALINE HYDROCHLORIDE 50 MG/1
50 TABLET, FILM COATED ORAL
Qty: 10 | Refills: 0 | Status: ACTIVE | COMMUNITY
Start: 2023-12-27 | End: 1900-01-01

## 2024-01-15 NOTE — DISCUSSION/SUMMARY
[Low acute suicide risk] : Low acute suicide risk [No] : No [FreeTextEntry1] : Semaj French is a 59 y/o  male who retired in 2009 after a work injury and resides by himself in [place]. [facility] the patient to [facility] upon d/c. The admission was from 12/9/23 to 12/14/23 due to depression symptoms with S/I. The patient reported worsening depression symptoms about 1.5 -2 years ago. The patient stated, "I just stopped caring for anything." The symptoms included low mood, low motivation, lack of self-care, social isolation, sleep problems, low appetite, etc. The patient reported having fleeting and passive s/i, which led to the last hospitalization but denied intention.  The patient reported experiencing depression symptoms "most of my life." He began medication treatment by a private psychiatrist in his 30s. The treatment abruptly stopped when the psychiatrist retired about three years ago. The patient reported his symptoms progressively worsened after stopping medications and as his friend relocated to a different state.   The patient has a limited social Que[place] and hobbies. He enjoyed working and began to work at age 15. 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 The patient  in his 40s, a little before the back injury at work leading to his early longterm. His ex-wife and 2 daughters (ages 23 & 21) live in [facility]. They regularly check on the patient's wellness, and he has regular contact with the older daughter. He has x1 older brother (Elgin, 66 y/o, [place])and x1 younger brother (55 y/o, [place]), having contacts with his brothers but rarely meeting them or doing things together. He spent most of his time at home watching TV. Upon discharge from the hospital, he began walking and cleaning around the house.   The patient reported a much-improved mood after the hospitalization, denied a/v/h, denied active s/h/i, denied self-injurious thoughts/behaviors, and denied recent use of substances. The patient denied hx of s/a, self-harming behaviors, prior [facility] admission, or substance-related treatment. He reported the recent passive s/i led to the last hospitalization, but he had no intention of hurting himself or ending his life. During the interview, he denied s/i. He was receptive to OPD levels of treatment, both for medication and individual psychotherapy treatment.
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
## 2024-01-15 NOTE — PSYCHOSOCIAL ASSESSMENT
[Yes, during lifetime] : Yes, during lifetime [_____] : First Use: [unfilled] [Other: _____] : [unfilled] [Yes (select details below)] : Have you ever experienced this type of event? Yes [felt numb or detached from people, activities, or your surrounding] : has felt numb or detached from people, activities, or surroundings [Retired] : retired [Financially stable] : financially stable [None] : none [Client lives alone] : client lives alone [Yes] : yes [N/A] : n/a [No] : Patient has personal representation (legal guardian, representative payee, conservatorship)? No [FreeTextEntry2] : Pt's strengths: "Knowledge, enjoy learning." Pt's support: "I don't have lots of support." Pt's stressors: "Thinking about what I need to and doing it." [had nightmares about the event(s) or thought about the event(s) when you did not want] : did not have nightmares and/or unwanted thoughts about the events [tried hard not to think about the event(s) or went out of your way to avoid situations that reminded you of the event] : did not need to avoid thinking about events, did not need to avoid situations that might remind patient of events [has been constantly on guard, watchful, or easily startled] : has not been constantly on guard, watchful, or easily startled [felt guilty or unable to stop blaming yourself or others for the event(s) or any problems the event(s) may have caused] : has not felt guilty or unable to stop blaming self or others for event(s), or any problems the event(s) may have caused [FreeTextEntry1] : Retired after working for [employer] for 25 years. [FreeTextEntry3] : Pt denied any concerns. [FreeTextEntry7] : Susan French, Daughter, [phone] [FreeTextEntry8] : Elgin French, Brother, [phone]

## 2024-01-15 NOTE — PSYCHOSOCIAL ASSESSMENT
[Yes, during lifetime] : Yes, during lifetime [_____] : First Use: [unfilled] [Other: _____] : [unfilled] [Yes (select details below)] : Have you ever experienced this type of event? Yes [felt numb or detached from people, activities, or your surrounding] : has felt numb or detached from people, activities, or surroundings [Retired] : retired [Financially stable] : financially stable [None] : none [Client lives alone] : client lives alone [Yes] : yes [N/A] : n/a [No] : Patient has personal representation (legal guardian, representative payee, conservatorship)? No [FreeTextEntry2] : Pt's strengths: "Knowledge, enjoy learning." Pt's support: "I don't have lots of support." Pt's stressors: "Thinking about what I need to and doing it." [had nightmares about the event(s) or thought about the event(s) when you did not want] : did not have nightmares and/or unwanted thoughts about the events [tried hard not to think about the event(s) or went out of your way to avoid situations that reminded you of the event] : did not need to avoid thinking about events, did not need to avoid situations that might remind patient of events [has been constantly on guard, watchful, or easily startled] : has not been constantly on guard, watchful, or easily startled [felt guilty or unable to stop blaming yourself or others for the event(s) or any problems the event(s) may have caused] : has not felt guilty or unable to stop blaming self or others for event(s), or any problems the event(s) may have caused [FreeTextEntry1] : Retired after working for [employer] for 25 years. [FreeTextEntry3] : Pt denied any concerns. [FreeTextEntry7] : Susan French, Daughter, [phone] [FreeTextEntry8] : Elgin French, Brother, [phone]

## 2024-01-15 NOTE — PLAN
[[place] Therapy] : [facility] Therapy  [Motivational Interviewing] : Motivational Interviewing  [Supportive Therapy] : Supportive Therapy [FreeTextEntry2] : New patient - no active treatment plan at this time.  [de-identified] : Semaj attended the initial  intake/psychosocial assessment upon IPP d/c. The patient reported significant improved mood since the d/c. He denied active s/h/i but having difficulty with sleep. The therapist supported the patient in openly discussing his concerning symptoms and recommended to share the issues with the psychiatrist. He denied imminent safety concerns. We will complete the assessment in the next session.    Plan/Practice Task(s): - Monitor symptoms - Attend psych evaluation as scheduled  Skills Training:  - None at this time  Recommended services & referrals made: - None at this time  Support Network (established contact consent/PHI Release): - Susan French, Daughter - Elgin French, Brother  Follow-up: - Return in 2 week(s)  [Unable to complete assessment] : Unable to complete assessment [FreeTextEntry3] : Unable to determine until psych eval completed

## 2024-01-15 NOTE — REASON FOR VISIT
[Patient] : Patient [Post-Hospitalization Visit] : This is a post-hospitalization visit [FreeTextEntry4] : 11:15 AM [Number can be texted] : number can be texted [OK  to leave message] : OK  to leave message [Psychiatric Inpatient] : Psychiatric Inpatient [[facility] Provider/Facility] : [facility] Provider/Facility [FreeTextEntry5] : English [FreeTextEntry6] : Semaj [FreeTextEntry7] : he/him/his [FreeTextEntry2] : Major depressive disorder, single episode, unspecified (F32.9)  [FreeTextEntry1] : [facility]. The admission was from 12/9/23 to 12/14/23 due to depression symptoms with S/I.
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
## 2024-01-15 NOTE — REASON FOR VISIT
[Patient] : Patient [Post-Hospitalization Visit] : This is a post-hospitalization visit [FreeTextEntry4] : 11:15 AM [Number can be texted] : number can be texted [OK  to leave message] : OK  to leave message [Psychiatric Inpatient] : Psychiatric Inpatient [[facility] Provider/Facility] : [facility] Provider/Facility [FreeTextEntry5] : English [FreeTextEntry6] : Semaj [FreeTextEntry7] : he/him/his [FreeTextEntry2] : Major depressive disorder, single episode, unspecified (F32.9)  [[facility]Entry1] [facility] The admission was from 12/9/23 to 12/14/23 due to depression symptoms with S/I.

## 2024-01-15 NOTE — DISCUSSION/SUMMARY
[Low acute suicide risk] : Low acute suicide risk [No] : No [FreeTextEntry1] : Semaj French is a 57 y/o  male who retired in 2009 after a work injury and resides by himself in [place]. [facility] IPP referred the patient to [facility] upon d/c. The admission was from 12/9/23 to 12/14/23 due to depression symptoms with S/I. The patient reported worsening depression symptoms about 1.5 -2 years ago. The patient stated, "I just stopped caring for anything." The symptoms included low mood, low motivation, lack of self-care, social isolation, sleep problems, low appetite, etc. The patient reported having fleeting and passive s/i, which led to the last hospitalization but denied intention.  The patient reported experiencing depression symptoms "most of my life." He began medication treatment by a private psychiatrist in his 30s. The treatment abruptly stopped when the psychiatrist retired about three years ago. The patient reported his symptoms progressively worsened after stopping medications and as his friend relocated to a different state.   The patient has a limited social [place] and hobbies. He enjoyed working and began to work at age 15. 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 The patient  in his 40s, a little before the back injury at work leading to his early [facility]. His ex-wife and 2 daughters (ages 23 & 21) live in [place] [facility] [facility]. They regularly check on the patient's wellness, and he has regular contact with the older daughter. He has x1 older brother (Elgin, 64 y/o, [place])and x1 younger brother (55 y/o, [place]), having contacts with his brothers but rarely meeting them or doing things together. He spent most of his time at home watching TV. Upon discharge from the hospital, he began walking and cleaning around the house.   The patient reported a much-improved mood after the hospitalization, denied a/v/h, denied active s/h/i, denied self-injurious thoughts/behaviors, and denied recent use of substances. The patient denied hx of s/a, self-harming behaviors, prior [facility] admission, or substance-related treatment. He reported the recent passive s/i led to the last hospitalization, but he had no intention of hurting himself or ending his life. During the interview, he denied s/i. He was receptive to OPD levels of treatment, both for medication and individual psychotherapy treatment.

## 2024-01-15 NOTE — PLAN
[[facility]] : [facility]  [Motivational Interviewing] : Motivational Interviewing  [Supportive Therapy] : Supportive Therapy [FreeTextEntry2] : New patient - no active treatment plan at this time.  [de-identified] : Semaj attended the initial  intake/psychosocial assessment upon IPP d/c. The patient reported significant improved mood since the d/c. He denied active s/h/i but having difficulty with sleep. The therapist supported the patient in openly discussing his concerning symptoms and recommended to share the issues with the psychiatrist. He denied imminent safety concerns. We will complete the assessment in the next session.    Plan/Practice Task(s): - Monitor symptoms - Attend psych evaluation as scheduled  Skills Training:  - None at this time  Recommended services & referrals made: - None at this time  Support Network (established contact consent/PHI Release): - Susan French, Daughter - Elgin French, Brother  Follow-up: - Return in 2 week(s)  [Unable to complete assessment] : Unable to complete assessment [FreeTextEntry3] : Unable to determine until psych eval completed

## 2024-01-15 NOTE — HISTORY OF PRESENT ILLNESS
[FreeTextEntry1] : Semaj French is a 57 y/o  male who retired in 2009 after a work injury and resides by himself in [place]. [facility] IPP referred the patient to [facility] upon d/c. The admission was from 12/9/23 to 12/14/23 due to depression symptoms with S/I. The patient reported worsening depression symptoms about 1.5 -2 years ago. The patient stated, "I just stopped caring for anything." The symptoms included low mood, low motivation, lack of self-care, social isolation, sleep problems, low appetite, etc. The patient reported having fleeting and passive s/i but denied intention.  The patient reported experiencing depression symptoms "most of my life." He began medication treatment by a private psychiatrist in his 30s. The treatment abruptly stopped when the psychiatrist retired about three years ago. The patient reported his symptoms progressively worsened after stopping medications and as his friend relocated to a different state. The patient denied a/v/h, denied active s/h/i, denied self-injurious thoughts/behaviors, and denied recent use of substances. The patient denied hx of s/a, self-harming behaviors, prior [facility] admission, or substance-related treatment.

## 2024-01-15 NOTE — HISTORY OF PRESENT ILLNESS
[FreeTextEntry1] : Semaj French is a 59 y/o  male who retired in 2009 after a work injury and resides by himself in [place]. [facility] IPP [facility]/c. The admission was from 12/9/23 to 12/14/23 due to depression symptoms with S/I. The patient reported worsening depression symptoms about 1.5 -2 years ago. The patient stated, "I just stopped caring for anything." The symptoms included low mood, low motivation, lack of self-care, social isolation, sleep problems, low appetite, etc. The patient reported having fleeting and passive s/i but denied intention.  The patient reported experiencing depression symptoms "most of my life." He began medication treatment by a private psychiatrist in his 30s. The treatment abruptly stopped when the psychiatrist retired about three years ago. The patient reported his symptoms progressively worsened after stopping medications and as his friend relocated to a different state. The patient denied a/v/h, denied active s/h/i, denied self-injurious thoughts/behaviors, and denied recent use of substances. The patient denied hx of s/a, self-harming behaviors, prior [facility] admission, or substance-related treatment.

## 2024-01-16 ENCOUNTER — NON-APPOINTMENT (OUTPATIENT)
Age: 59
End: 2024-01-16

## 2024-01-18 ENCOUNTER — NON-APPOINTMENT (OUTPATIENT)
Age: 59
End: 2024-01-18

## 2024-01-18 ENCOUNTER — APPOINTMENT (OUTPATIENT)
Dept: PSYCHIATRY | Facility: CLINIC | Age: 59
End: 2024-01-18

## 2024-01-19 ENCOUNTER — APPOINTMENT (OUTPATIENT)
Dept: PSYCHIATRY | Facility: CLINIC | Age: 59
End: 2024-01-19

## 2024-01-19 ENCOUNTER — OUTPATIENT (OUTPATIENT)
Dept: OUTPATIENT SERVICES | Facility: HOSPITAL | Age: 59
LOS: 1 days | End: 2024-01-19
Payer: MEDICARE

## 2024-01-19 DIAGNOSIS — Z90.5 ACQUIRED ABSENCE OF KIDNEY: Chronic | ICD-10-CM

## 2024-01-19 DIAGNOSIS — F32.9 MAJOR DEPRESSIVE DISORDER, SINGLE EPISODE, UNSPECIFIED: ICD-10-CM

## 2024-01-19 PROCEDURE — 90832 PSYTX W PT 30 MINUTES: CPT

## 2024-01-19 NOTE — PLAN
[Trosper Therapy] : Trosper Therapy  [Motivational Interviewing] : Motivational Interviewing  [Supportive Therapy] : Supportive Therapy [FreeTextEntry2] : New patient - no active treatment plan at this time.  [de-identified] : Semaj confirmed taking all medications as prescribed but missed his last appointments with the psychiatrist due to chronic medical conditions. He confirmed making a f/u appointment with his PCP and agreed to contact Dr. Sherwood to reschedule. The patient reported continuing to do well and denied immediate concerns with his symptoms. Considering his recent hospitalization and depressive episode, we completed the safety plan in the session today. We will discuss the treatment plan in the next session.   BH Plan/Practice Task(s): - Monitor symptoms - Attend psych evaluation as scheduled  Skills Training:  - None at this time  Recommended services & referrals made: - None at this time  Support Network (established contact consent/PHI Release): - Susan French, Daughter - Elgin French, Brother  Follow-up: - Return in 2 week(s)

## 2024-01-19 NOTE — REASON FOR VISIT
[Patient] : Patient [Patient preference] : as per patient preference [Access issues (e.g., transportation, impaired mobility, etc.)] : due to patient's access issues [Telehealth (audio & video) - Individual/Group] : This visit was provided via telehealth using real-time 2-way audio visual technology. [Other Location: e.g. Home (Enter Location, City,State)___] : The provider was located at [unfilled]. [Home] : The patient, [unfilled], was located at home, [unfilled], at the time of the visit. [Verbal consent obtained from patient/other participant(s)] : Verbal consent for telehealth/telephonic services obtained from patient/other participant(s) [FreeTextEntry4] : 11:16 AM [FreeTextEntry5] : 11:49 AM [FreeTextEntry1] : Psychotherapy follow-up visit with the treatment diagnoses of 1) (296.20) (F32.9) Major depressive disorder.

## 2024-01-19 NOTE — PLAN
[Iliff Therapy] : Iliff Therapy  [Motivational Interviewing] : Motivational Interviewing  [Supportive Therapy] : Supportive Therapy [FreeTextEntry2] : New patient - no active treatment plan at this time.  [de-identified] : Semaj confirmed taking all medications as prescribed but missed his last appointments with the psychiatrist due to chronic medical conditions. He confirmed making a f/u appointment with his PCP and agreed to contact Dr. Sherwood to reschedule. The patient reported continuing to do well and denied immediate concerns with his symptoms. Considering his recent hospitalization and depressive episode, we completed the safety plan in the session today. We will discuss the treatment plan in the next session.   BH Plan/Practice Task(s): - Monitor symptoms - Attend psych evaluation as scheduled  Skills Training:  - None at this time  Recommended services & referrals made: - None at this time  Support Network (established contact consent/PHI Release): - Susan French, Daughter - Elgin French, Brother  Follow-up: - Return in 2 week(s)

## 2024-01-19 NOTE — END OF VISIT
[Licensed Clinician] : Licensed Clinician [Duration of Psychotherapy Visit (minutes spent in synchronous communication): ____] : Duration of Psychotherapy Visit (minutes spent in synchronous communication): [unfilled] [Individual Psychotherapy for 16-37 minutes] : Individual Psychotherapy for 16-37 minutes

## 2024-01-20 DIAGNOSIS — F32.9 MAJOR DEPRESSIVE DISORDER, SINGLE EPISODE, UNSPECIFIED: ICD-10-CM

## 2024-01-23 ENCOUNTER — NON-APPOINTMENT (OUTPATIENT)
Age: 59
End: 2024-01-23

## 2024-01-27 NOTE — BH PATIENT PROFILE - NSPROMEDSBROUGHTTOHOSP_GEN_A_NUR
Patient called with complaints of pain. Is alternating percocet and ibuprofen and reports continued pain. Will send robaxin to pharmacy.  
no

## 2024-01-29 ENCOUNTER — APPOINTMENT (OUTPATIENT)
Dept: PSYCHIATRY | Facility: CLINIC | Age: 59
End: 2024-01-29

## 2024-01-30 NOTE — DISCUSSION/SUMMARY
[1. Helpful Person/Contact Number: _____] : 1. Helpful Person/Contact Number: [unfilled] [2. Helpful Person/Contact Number: _____] : 2. Helpful Person/Contact Number: [unfilled] [a. Clinician Name/Contact Information: _____] : Clinician Name/Contact Information: [unfilled] [b. Clinician Name/Contact Information: _____] : Clinician Name/Contact Information: [unfilled] [c. Local ED/Urgent Care Services/Hotlines (Name/Address/Phone):] : Local ED/Urgent Care Services/Hotlines (Name/Address/Phone): [d. Suicide Prevention Lifeline Phone: 8-325-126-TALK ([phone]) ] : Suicide Prevention Lifeline Phone: 6-482-349-AGLW ([phone])  [e. Suicide Prevention “Text the word APN3 ez 956260”] : e. Suicide Prevention "Text the word PWO3 pg 205344"  [f. Suicide Prevention - LGBTQ Specific Phone: 1-220.627.8917] : Suicide Prevention - LGBTQ Specific Phone: [phone] [h. Additional Resources:] : Additional Resources: [g. Suicide & Crisis Lifeline - send a text or make a call to 988] : Suicide & Crisis Lifeline - send a text or make a call to 988 [FreeTextEntry1] : 1/19/2024 [FreeTextEntry2] : 1. "Being alone" 2. "Lack of immediate support" 3. "The house being very quiet." [FreeTextEntry3] : 1. "Worrying more about the past instead of thinking about the future" 2. "Thinking about the same things repeatedly" 3. "Not cleaning the house" 4. "Not taking care of myself, such as not showering, not changing clothes, etc." [FreeTextEntry4] : 1. "Going back  to my regular schedule or trying to maintain my daily routines" 2. "Talking to other people, my brother-in-law, daughters, etc." 3. "Engaging in pleasant activities, such as reading, cooking, or something new and different." 4. "Taking a ride." [FreeTextEntry5] : 1. "My brothers" 2. "My daughters" [de-identified] : [facility] [address] [place] [address] [phone]   [de-identified] : 24/7 [employer] [de-identified] : Semaj denied and was receptive to the therapist's recommendation, sharing the safety plan with the people in his support network.  [de-identified] : Semaj denied having access to weapons and denied having any environmental safety issues. [de-identified] : N/A [de-identified] : "My daughters"

## 2024-01-30 NOTE — DISCUSSION/SUMMARY
[1. Helpful Person/Contact Number: _____] : 1. Helpful Person/Contact Number: [unfilled] [2. Helpful Person/Contact Number: _____] : 2. Helpful Person/Contact Number: [unfilled] [a. Clinician Name/Contact Information: _____] : Clinician Name/Contact Information: [unfilled] [b. Clinician Name/Contact Information: _____] : Clinician Name/Contact Information: [unfilled] [c. Local ED/Urgent Care Services/Hotlines (Name/Address/Phone):] : Local ED/Urgent Care Services/Hotlines (Name/Address/Phone): [d. Suicide Prevention Lifeline Phone: 8-415-091-TALK ([phone]) ] : Suicide Prevention Lifeline Phone: 8-588-680-UGRH ([phone])  [e. Suicide Prevention “Text the word GJY3 sf 004423”] : e. Suicide Prevention "Text the word JPD0 gs 051848"  [f. Suicide Prevention - LGBTQ Specific Phone: [phone]] : Suicide Prevention - LGBTQ Specific Phone: [phone] [h. Additional Resources:] : Additional Resources: [g. Suicide & Crisis Lifeline - send a text or make a call to 988] : Suicide & Crisis Lifeline - send a text or make a call to 988 [FreeTextEntry1] : 1/19/2024 [FreeTextEntry2] : 1. "Being alone" 2. "Lack of immediate support" 3. "The house being very quiet." [FreeTextEntry3] : 1. "Worrying more about the past instead of thinking about the future" 2. "Thinking about the same things repeatedly" 3. "Not cleaning the house" 4. "Not taking care of myself, such as not showering, not changing clothes, etc." [FreeTextEntry4] : 1. "Going back  to my regular schedule or trying to maintain my daily routines" 2. "Talking to other people, my brother-in-law, daughters, etc." 3. "Engaging in pleasant activities, such as reading, cooking, or something new and different." 4. "Taking a ride." [FreeTextEntry5] : 1. "My brothers" 2. "My daughters" [de-identified] : [facility] [address] [place] [address] [phone]   [de-identified] : 24/7 [employer] Support [facility] [de-identified] : Semaj denied and was receptive to the therapist's recommendation, sharing the safety plan with the people in his support network.  [de-identified] : Semaj denied having access to weapons and denied having any environmental safety issues. [de-identified] : N/A [de-identified] : "My daughters"

## 2024-02-01 ENCOUNTER — NON-APPOINTMENT (OUTPATIENT)
Age: 59
End: 2024-02-01

## 2024-02-02 ENCOUNTER — APPOINTMENT (OUTPATIENT)
Dept: PSYCHIATRY | Facility: CLINIC | Age: 59
End: 2024-02-02

## 2024-02-02 ENCOUNTER — OUTPATIENT (OUTPATIENT)
Dept: OUTPATIENT SERVICES | Facility: HOSPITAL | Age: 59
LOS: 1 days | End: 2024-02-02
Payer: COMMERCIAL

## 2024-02-02 DIAGNOSIS — F32.9 MAJOR DEPRESSIVE DISORDER, SINGLE EPISODE, UNSPECIFIED: ICD-10-CM

## 2024-02-02 DIAGNOSIS — Z90.5 ACQUIRED ABSENCE OF KIDNEY: Chronic | ICD-10-CM

## 2024-02-02 PROCEDURE — 90832 PSYTX W PT 30 MINUTES: CPT

## 2024-02-02 NOTE — END OF VISIT
[Individual Psychotherapy for 16-37 minutes] : Individual Psychotherapy for 16-37 minutes [Licensed Clinician] : Licensed Clinician [Duration of Psychotherapy Visit (minutes spent in synchronous communication): ____] : Duration of Psychotherapy Visit (minutes spent in synchronous communication): [unfilled]

## 2024-02-02 NOTE — REASON FOR VISIT
[Patient preference] : as per patient preference [Access issues (e.g., transportation, impaired mobility, etc.)] : due to patient's access issues [Telehealth (audio & video) - Individual/Group] : This visit was provided via telehealth using real-time 2-way audio visual technology. [Other Location: e.g. Home (Enter Location, City,State)___] : The provider was located at [unfilled]. [Home] : The patient, [unfilled], was located at home, [unfilled], at the time of the visit. [Verbal consent obtained from patient/other participant(s)] : Verbal consent for telehealth/telephonic services obtained from patient/other participant(s) [Patient] : Patient [FreeTextEntry4] : 3:15 PM [FreeTextEntry5] : 3:50 PM [FreeTextEntry3] : [facility] [FreeTextEntry1] : Psychotherapy follow-up visit with the treatment diagnoses of 1) (296.20) (F32.9) Major depressive disorder.

## 2024-02-02 NOTE — PLAN
[Avant Therapy] : Avant Therapy  [Motivational Interviewing] : Motivational Interviewing  [Supportive Therapy] : Supportive Therapy [FreeTextEntry2] : Treatment Goal (effective as of 2/2/2024) Semaj will identify x3 triggers of anxiety and low mood and gain x3 coping strategies to manage symptoms.  Objective A. Pt will use x1-3 coping skills daily to regulate thoughts and emotions.  Objective B. Pt will review mood-altering events in session every 1-3 weeks to process triggers and coping skill applications to gain self-awareness and insights.  Objective C. Pt will maintain medication management as prescribed daily and attend all scheduled appointments.  Recommended Frequency of Visits: Medication Management: Every 2-8 weeks Individual Therapy: Every 1-3 weeks   [de-identified] : Semaj confirmed taking all medications as prescribed and left a message with the  team to respond to Dr. Sherwood's calls. The therapist shared the psychiatrist's contact information, and the patient agreed to call him to reschedule. The patient reported a fair mood, denied worsening symptoms, and denied imminent concerning issues. We discussed the treatment plan during today's session.  BH Plan/Practice Task(s): - Monitor symptoms - Attend psych evaluation as scheduled  Skills Training:  - None at this time  Recommended services & referrals made: - None at this time  Support Network (established contact consent/PHI Release): - Susan French, Daughter - Elgin French, Brother  Follow-up: - Return in 3 week(s)

## 2024-02-02 NOTE — PLAN
[Ceiba Therapy] : Ceiba Therapy  [Motivational Interviewing] : Motivational Interviewing  [Supportive Therapy] : Supportive Therapy [FreeTextEntry2] : Treatment Goal (effective as of 2/2/2024) Semaj will identify x3 triggers of anxiety and low mood and gain x3 coping strategies to manage symptoms.  Objective A. Pt will use x1-3 coping skills daily to regulate thoughts and emotions.  Objective B. Pt will review mood-altering events in session every 1-3 weeks to process triggers and coping skill applications to gain self-awareness and insights.  Objective C. Pt will maintain medication management as prescribed daily and attend all scheduled appointments.  Recommended Frequency of Visits: Medication Management: Every 2-8 weeks Individual Therapy: Every 1-3 weeks   [de-identified] : Semaj confirmed taking all medications as prescribed and left a message with the  team to respond to Dr. Sherwood's calls. The therapist shared the psychiatrist's contact information, and the patient agreed to call him to reschedule. The patient reported a fair mood, denied worsening symptoms, and denied imminent concerning issues. We discussed the treatment plan during today's session.  BH Plan/Practice Task(s): - Monitor symptoms - Attend psych evaluation as scheduled  Skills Training:  - None at this time  Recommended services & referrals made: - None at this time  Support Network (established contact consent/PHI Release): - Susan French, Daughter - Elgin French, Brother  Follow-up: - Return in 3 week(s)

## 2024-02-02 NOTE — REASON FOR VISIT
[Patient preference] : as per patient preference [Access issues (e.g., transportation, impaired mobility, etc.)] : due to patient's access issues [Telehealth (audio & video) - Individual/Group] : This visit was provided via telehealth using real-time 2-way audio visual technology. [Other Location: e.g. Home (Enter Location, City,State)___] : The provider was located at [unfilled]. [Home] : The patient, [unfilled], was located at home, [unfilled], at the time of the visit. [Verbal consent obtained from patient/other participant(s)] : Verbal consent for telehealth/telephonic services obtained from patient/other participant(s) [Patient] : Patient [FreeTextEntry4] : 3:15 PM [FreeTextEntry5] : 3:50 PM [FreeTextEntry3] : [facility] [FreeTextEntry1] : Psychotherapy follow-up visit with the treatment diagnoses of 1) (296.20) (F32.9) Major depressive disorder.

## 2024-02-03 DIAGNOSIS — F32.9 MAJOR DEPRESSIVE DISORDER, SINGLE EPISODE, UNSPECIFIED: ICD-10-CM

## 2024-02-05 NOTE — BH INPATIENT PSYCHIATRY ASSESSMENT NOTE - TELEPSYCHIATRY?
Medicare Wellness Visit  Plan for Preventive Care    A good way for you to stay healthy is to use preventive care.  Medicare covers many services that can help you stay healthy.* The goal of these services is to find any health problems as quickly as possible. Finding problems early can help make them easier to treat.  Your personal plan below lists the services you may need and when they are due.      Health Maintenance Summary       Pneumococcal Vaccine 65+ (1 of 2 - PCV)  Overdue - never done    Shingles Vaccine (1 of 2)  Overdue - never done    Hepatitis C Screening (Once)  Overdue - never done    Traditional Medicare- Medicare Wellness Visit (Yearly)  Overdue - never done    Depression Screening (Yearly)  Due since 1/11/2024    Osteoporosis Screening (Once)  Due since 1/31/2024    Breast Cancer Screening (Yearly)  Due soon on 3/8/2024    Colorectal Cancer Screen- (Cologuard - Every 3 Years)  Next due on 5/5/2024    DTaP/Tdap/Td Vaccine (2 - Td or Tdap)  Next due on 9/17/2025    Influenza Vaccine   Completed    COVID-19 Vaccine   Completed    Meningococcal Vaccine   Aged Out    Hepatitis B Vaccine (For Physician/APC Discussion)   Aged Out    HPV Vaccine   Aged Out             Preventive Care for Women and Men    Heart Screenings (Cardiovascular):  Blood tests are used to check your cholesterol, lipid and triglyceride levels. High levels can increase your risk for heart disease and stroke. High levels can be treated with medications, diet and exercise. Lowering your levels can help keep your heart and blood vessels healthy.  Your provider will order these tests if they are needed.    An ultrasound is done to see if you have an abdominal aortic aneurysm (AAA).  This is an enlargement of one of the main blood vessels that delivers blood to the body.   In the [place], 9,000 deaths are caused by AAA.  You may not even know you have this problem and as many as 1 in 3 people will have a serious problem if it is  not treated.  Early diagnosis allows for more effective treatment and cure.  If you have a family history of AAA or are a male age 65-75 who has smoked, you are at higher risk of an AAA.  Your provider can order this test, if needed.    Colorectal Screening:  There are many tests that are used to check for cancer of your colon and rectum. You and your provider should discuss what test is best for you and when to have it done.  Options include:  Screening Colonoscopy: exam of the entire colon, seen through a flexible lighted tube.  Flexible Sigmoidoscopy: exam of the last third (sigmoid portion) of the colon and rectum, seen through a flexible lighted tube.  Cologuard DNA stool test: a sample of your stool is used to screen for cancer and unseen blood in your stool.  Fecal Occult Blood Test: a sample of your stool is studied to find any unseen blood    Flu Shot:  An immunization that helps to prevent influenza (the flu). You should get this every year. The best time to get the shot is in the fall.    Pneumococcal Shot:  Vaccines help prevent pneumococcal disease, which is any type of illness caused by Streptococcus pneumoniae bacteria. There are two kinds of pneumococcal vaccines available in the [place]:   Pneumococcal conjugate vaccines (PCV20 or Wrgrptq53®)  Pneumococcal polysaccharide vaccine (PPSV23 or Xhotsaaci38®)  For those who have never received any pneumococcal conjugate vaccine, CDC recommends PVC20 for adults 65 years or older and adults 19 through 64 years old with certain medical conditions or risk factors.   For those who have previously received PCV13, this should be followed by a dose of PPSV23.     Hepatitis B Shot:  An immunization that helps to protect people from getting Hepatitis B. Hepatitis B is a virus that spreads through contact with infected blood or body fluids. Many people with the virus do not have symptoms.  The virus can lead to serious problems, such as liver disease. Some  people are at higher risk than others. Your doctor will tell you if you need this shot.     Diabetes Screening:  A test to measure sugar (glucose) in your blood is called a fasting blood sugar. Fasting means you cannot have food or drink for at least 8 hours before the test. This test can detect diabetes long before you may notice symptoms.    Glaucoma Screening:  Glaucoma screening is performed by your eye doctor. The test measures the fluid pressure inside your eyes to determine if you have glaucoma.     Hepatitis C Screening:  A blood test to see if you have the hepatitis C virus.  Hepatitis C attacks the liver and is a major cause of chronic liver disease.  Medicare will cover a single screening for all adults born between 1945 & 1965, or high risk patients (people who have injected illegal drugs or people who have had blood transfusions).  High risk patients who continue to inject illegal drugs can be screened for Hepatitis C every year.    Smoking and Tobacco-Use Cessation Counseling:  Tobacco is the single greatest cause of disease and early death in our country today. Medication and counseling together can increase a person’s chance of quitting for good.   Medicare covers two quitting attempts per year, with four counseling sessions per attempt (eight sessions in a 12 month period)    Preventive Screening tests for Women    Screening Mammograms and Breast Exams:  An x-ray of your breasts to check for breast cancer before you or your doctor may be able to feel it.  If breast cancer is found early it can usually be treated with success.    Pelvic Exams and Pap Tests:  An exam to check for cervical and vaginal cancer. A Pap test is a lab test in which cells are taken from your cervix and sent to the lab to look for signs of cervical cancer. If cancer of the cervix is found early, chances for a cure are good. Testing can generally end at age 65, or if a woman has a hysterectomy for a benign condition. Your  provider may recommend more frequent testing if certain abnormal results are found.    Bone Mass Measurements:  A painless x-ray of your bone density to see if you are at risk for a broken bone. Bone density refers to the thickness of bones or how tightly the bone tissue is packed.    Preventive Screening tests for Men    Prostate Screening:  Should you have a prostate cancer test (PSA)?  It is up to you to decide if you want a prostate cancer test. Talk to your clinician to find out if the test is right for you.  Things for you to consider and talk about should include:  Benefits and harms of the test  Your family history  How your race/ethnicity may influence the test  If the test may impact other medical conditions you have  Your values on screenings and treatments    *Medicare pays for many preventive services to keep you healthy. For some of these services, you might have to pay a deductible, coinsurance, and / or copayment.  The amounts vary depending on the type of services you need and the kind of Medicare health plan you have.    For further details on screenings offered by Medicare please visit: https://www.medicare.gov/coverage/preventive-screening-services   Patient Education     Weight Management: Getting Started  Healthy bodies come in all shapes and sizes. Not all bodies are made to be thin. For some people, a healthy weight is higher than the average weight listed on weight charts. Your healthcare provider can help you decide on a healthy weight for you.   Reasons to lose weight  Losing weight can help with some health problems, such as high blood pressure, heart disease, diabetes, sleep apnea, and arthritis. You may also feel more energy.   Set your long-term goal  Your goal doesn't even have to be a specific weight. You may decide on a fitness goal such as being able to walk 10 miles a week, or a health goal such as lowering your blood pressure. Choose a goal that is measurable and reasonable, so  you know when you've reached it. A goal of reaching a BMI of less than 25 is not always reasonable (or possible).    Make an action plan  Habits don’t change overnight. Setting your goals too high can leave you feeling discouraged if you can’t reach them. Be realistic. Choose one or two small changes you can make now. Set an action plan for how you are going to make these changes. When you can stick to this plan, keep making a few more small changes. Taking small steps will help you stay on the path to success.   Track your progress  Write down your goals. Then keep a daily record of your progress. Write down what you eat and how active you are. This record lets you look back on how much you’ve done. It may also help when you’re feeling frustrated. Reward yourself for success. Even if you don’t reach every goal, give yourself credit for what you do get done.     Get support  Encouragement from others can help make losing weight easier. Ask your family members and friends for support. They may even want to join you. Also look to your healthcare provider, registered dietitian, and  for help. Your local hospital can give you more information about nutrition, exercise, and weight loss. Get a thorough checkup before you start any exercise program or change your diet.   [employer] last reviewed this educational content on 11/1/2020  © 3137-8814 [employer]. All rights reserved. This information is not intended as a substitute for professional medical care. Always follow your healthcare professional's instructions.         Please call Radiology phone # [phone] to schedule Mammogram/Ultrasound/Bone Density Scan You have been referred to:Gastroenterology  - Phone # is [phone]. Please call if you have not heard from that department in 3 days.    Yes No

## 2024-02-05 NOTE — ADDENDUM
[FreeTextEntry1] : - The patient prefers hybrid sessions.  - The patient denied s/i but the safety plan was discussed on 1/19/24 considering his recent IPP admission due to worsening depression symptoms and passive s/i.  - The treatment plan was delayed due to Pt's appts cancellations and his scheduling conflicts.   PRIMARY CARE  Name: Dr. Mukesh Laguna MD Address: [address] [place] [address] Phone Number: [phone] Last PCP visit: 2021, expected to meet with PCP in February.  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 Calvin   - The patient denied any concerns or problem with substance use. - The patient denied substance-related hx of hospitalization, detox/rehab, or treatment.   SMOKING CESSATION Do you Smoke? [ ] Yes [x] No Do you want to quit? [ ] Yes [ ] No [x] N/A

## 2024-02-05 NOTE — ADDENDUM
[FreeTextEntry1] : - The patient prefers hybrid sessions.  - The patient denied s/i but the safety plan was discussed on 1/19/24 considering his recent IPP admission due to worsening depression symptoms and passive s/i.  - The treatment plan was delayed due to Pt's appts cancellations and his scheduling conflicts.   PRIMARY CARE  Name: Dr. Mukesh Laguna MD Address: [address] [place] [address] Phone Number: [phone] Last PCP visit: 2021, expected to meet with PCP in February.  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 Calvin   - The patient denied any concerns or problem with substance use. - The patient denied substance-related hx of hospitalization, detox/rehab, or treatment.   SMOKING CESSATION Do you Smoke? [ ] Yes [x] No Do you want to quit? [ ] Yes [ ] No [x] N/A

## 2024-02-05 NOTE — DISCUSSION/SUMMARY
[Initial Plan] : Initial Plan [Attempting to realize their potential] : Attempting to realize their potential [Awareness of substance use issues] : awareness of substance use issues [Motivated to maintain or improve physical health] : motivated to maintain or improve physical health [Financially stable] : financially stable [Housing stability] : housing stability [English fluency] : English fluency [Access to safe outdoor spaces] : access to safe outdoor spaces [None - Reason others did not participate:] : None - Reason others did not participate:  [Psychiatric Provider/Prescriber] : Psychiatric Provider/Prescriber [Therapist] : Therapist [Supervisor (if needed)] : Supervisor [Yes] : Yes [Initial] : Initial [every ___ weeks] : every [unfilled] weeks [FreeTextEntry1] : 2/2/2024 [FreeTextEntry2] : 2/2/2025 [FreeTextEntry3] : 12/27/2023 [FreeTextEntry8] : New to mental health treatment [FreeTextEntry9] : Pt believes in God, and his daughters are important to him.  [de-identified] : None [Improvement in symptoms as evidenced by:] : Improvement in symptoms as evidenced by: [A change in level of care is needed as evidenced by:] : A change in level of care is needed as evidenced by: [Other rationale for transition/discharge:] : Other rationale for transition/discharge: [de-identified] : Pt will review mood-altering events in session every 1-3 weeks to process triggers and coping skill applications to gain self-awareness and insights.  [de-identified] : 2/2/2025 [FreeTextEntry4] : (Continued Problem/Need I) [de-identified] : Pt will maintain medication management as prescribed daily and attend scheduled appointments.  [de-identified] : 2/2/2025 [FreeTextEntry5] : Psychoeducation, Motivational Interviewing, and Supportive Therapy [de-identified] : The patient expresses improvement in performing activities of daily living and/or says progress with initial complaint symptoms. In addition, the treatment team will conduct diagnosis-based screenings as needed.  [de-identified] : If the patient is unable to perform activities of daily living and/or expresses suicidal ideation, a higher level of care will be considered. [de-identified] : Other rationales for transition/discharge are granted/applied upon the patient's not communicating with the providers, relocation, self-termination, and/or requests for treatment termination/transfer to another facility. [de-identified] : Pt declined.  [de-identified] : Tim Estafanous/Roxann Herron Ester Quezada

## 2024-02-05 NOTE — DISCUSSION/SUMMARY
[Initial Plan] : Initial Plan [Attempting to realize their potential] : Attempting to realize their potential [Awareness of substance use issues] : awareness of substance use issues [Motivated to maintain or improve physical health] : motivated to maintain or improve physical health [Financially stable] : financially stable [Housing stability] : housing stability [English fluency] : English fluency [Access to safe outdoor spaces] : access to safe outdoor spaces [None - Reason others did not participate:] : None - Reason others did not participate:  [Psychiatric Provider/Prescriber] : Psychiatric Provider/Prescriber [Therapist] : Therapist [Supervisor (if needed)] : Supervisor [Yes] : Yes [Initial] : Initial [every ___ weeks] : every [unfilled] weeks [FreeTextEntry1] : 2/2/2024 [FreeTextEntry2] : 2/2/2025 [FreeTextEntry3] : 12/27/2023 [FreeTextEntry8] : New to mental health treatment [FreeTextEntry9] : Pt believes in God, and his daughters are important to him.  [de-identified] : None [Improvement in symptoms as evidenced by:] : Improvement in symptoms as evidenced by: [A change in level of care is needed as evidenced by:] : A change in level of care is needed as evidenced by: [Other rationale for transition/discharge:] : Other rationale for transition/discharge: [de-identified] : Pt will review mood-altering events in session every 1-3 weeks to process triggers and coping skill applications to gain self-awareness and insights.  [de-identified] : 2/2/2025 [FreeTextEntry4] : (Continued Problem/Need I) [de-identified] : Pt will maintain medication management as prescribed daily and attend scheduled appointments.  [de-identified] : 2/2/2025 [FreeTextEntry5] : Psychoeducation, Motivational Interviewing, and Supportive Therapy [de-identified] : The patient expresses improvement in performing activities of daily living and/or says progress with initial complaint symptoms. In addition, the treatment team will conduct diagnosis-based screenings as needed.  [de-identified] : If the patient is unable to perform activities of daily living and/or expresses suicidal ideation, a higher level of care will be considered. [de-identified] : Other rationales for transition/discharge are granted/applied upon the patient's not communicating with the providers, relocation, self-termination, and/or requests for treatment termination/transfer to another facility. [de-identified] : Pt declined.  [de-identified] : Tim Estafanous/Roxann Herron Ester Quezada

## 2024-02-12 ENCOUNTER — NON-APPOINTMENT (OUTPATIENT)
Age: 59
End: 2024-02-12

## 2024-02-15 ENCOUNTER — NON-APPOINTMENT (OUTPATIENT)
Age: 59
End: 2024-02-15

## 2024-02-20 ENCOUNTER — APPOINTMENT (OUTPATIENT)
Dept: PSYCHIATRY | Facility: CLINIC | Age: 59
End: 2024-02-20

## 2024-02-22 ENCOUNTER — NON-APPOINTMENT (OUTPATIENT)
Age: 59
End: 2024-02-22

## 2024-02-22 NOTE — DISCUSSION/SUMMARY
[FreeTextEntry1] : 10-Day letter mailed out to patient today as patient has not been adherent to appointments and has not returned phone calls.

## 2024-03-05 NOTE — DISCUSSION/SUMMARY
[FreeTextEntry1] : Called patient at [phone] to check in. No answer. Left voicemail.  Also called patient's brother Elgin French ([phone]), since consent was obtained previously to speak to brother, and asked brother to let patient know to return calls if he was interested in continuing treatment at the clinic. Brother stated he would let the patient know.

## 2024-03-06 ENCOUNTER — NON-APPOINTMENT (OUTPATIENT)
Age: 59
End: 2024-03-06

## 2024-03-07 ENCOUNTER — NON-APPOINTMENT (OUTPATIENT)
Age: 59
End: 2024-03-07

## 2024-03-07 DIAGNOSIS — F32.9 MAJOR DEPRESSIVE DISORDER, SINGLE EPISODE, UNSPECIFIED: ICD-10-CM

## 2024-03-13 PROBLEM — F32.9 MAJOR DEPRESSIVE DISORDER: Status: ACTIVE | Noted: 2023-12-27

## 2024-03-13 NOTE — HISTORY OF PRESENT ILLNESS
[FreeTextEntry1] : Semaj French is a 58-year-old male, domiciled alone in a private residence,  for about 15 years, has two adult daughters (25y/o, 23y/o), retired [employer] worker following an accident in 2009, on SSI and pension for financial support, PMH of scoliosis, chronic back pain, renal cancer, past psychiatric history of depression, was following with an outpatient psychiatrist starting 15 years ago but stopped about 2 years ago, medication trials of Prozac 40 mg and short-course of augmenting with Abilify, no history of suicide attempts, 1 prior inpatient psychiatric admission in December 2023, no formal past substance use history, presents to the clinic for intake appointment following discharge from [facility].  Throughout the interview, patient was calm, cooperative, linear in thought process, answered questions appropriately. He reports his daughter called for a wellness check after he had not answered his phone and that police found the patient at his apartment, which was unkept. Patient reports for the last 1.5-2 years he has been essentially isolating at home and not seeing anyone, has felt very depressed, had low interest and motivation to see people or engage in self-care (e.g., allowed his hair to grow long, did not do laundry, stopped taking Prozac, low motivation to drive to [place] to fill out paperwork for his pension), low energy, low appetite, difficulty sleeping, and intermittent suicidal ideation with specific thoughts (e.g., "I have thought about different ways, like jumping from a bridge") but no intention, citing he would never do anything due to his daughters. Patient states the main trigger from 1.5-2 years ago was that his next-door neighbor which was the only person he was seeing had moved away, so patient felt isolated and grew more depressed.  Patient reports he first started a seeing a psychiatrist around 15 years ago due to depressed mood in the setting of marital issues and stopping working due to an accident at work where he was hit by a pole that caused severe back pain (patient denies ever having surgeries). States he was started on Prozac and was titrated to 40 mg qdaily and was also briefly on Abilify to augment Prozac (does not remember duration or dose of treatment). States he was taking Prozac until about 2 years ago. Reports it helped his depression. Patient denies any prior suicide attempts. When asked about the supposed suicide attempt documented in the EMR that lists patient attempted suicide by overdose, patient reports what actually happened was that he took his prescribed liquid oxycodone and was afraid he had taken too much so he gave himself narcan, called EMS, and explained the situation to them. He adamantly denies ever attempting suicide. He denies current suicidal ideation. Denies ever experiencing symptoms consistent with hamlet/hypomania, denies obsessive thoughts or compuslive behaviors (e.g., contamination, checking, washing) consistent with OCD. Denies visual/auditory hallucinations.  Patient states following his work accident in 2009 he started following with a pain management doctor and was started on liquid oxycodone which he was taking daily for about 10 years and stopped on his own 3 years ago. States when he first started with pain management he was on oxycodone tablets, but found himself calling other patients in the clinic and taking additional medication than prescribed. Patient reports he switched to liquid oxycodone and stopped taking non-prescribed medication. Regarding substance use, he stated he smoked 1 pack per day of cigarettes for 20 years. Denies any cannabis, alcohol, heroin, or other illicit substance use.  Patient states on the inpatient psychiatric unit he was restarted on Prozac and titrated to 40 mg qdaily. States was also started on Trazodone and titrated to 100 mg qHS for insomnia and Gabapentin 800 mg BID from neurology for neuropathic pain of his right leg. States while he has felt his depressed mood improve slightly with Prozac, he still feels depressed, still has intermittent suicidal thoughts with no intention, low motivation, guilt, low energy, difficulty sleeping. States he stopped taking Trazodone 7 days ago because he did not feel like it helped him. Patient states he would be agreeable to try a different medication. Agreeable to cross-titrate to reduce Prozac and start Zoloft. Potential side-effects of Zoloft, including weight gain, sexual dysfunction, hyponatremia, worsening depressed mood/suicidal ideation namely in those less than 24 y/o were explained to patient.  Patient denies any firearm access. He denies current suicial ideation. He reports understanding that he may contact [phone], or this writer's number if he has safety concerns, and may present to the ED if feeling unsafe.   [FreeTextEntry3] : States he was started on Prozac about 15 years ago and was titrated to 40 mg qdaily until about 2 years ago, when he stopped the medication due to low motivation to take it because he felt depressed (see HPI). Says was also briefly on Abilify to augment Prozac (does not remember duration or dose of treatment). Reports was started on Trazodone while at [facility] and titrated to 100 mg qHS but stopped taking the medication about 1 week after discharge because it did not help with sleep. [FreeTextEntry2] : Patient reports he first started a seeing a psychiatrist around 15 years ago due to depressed mood in the setting of marital issues and stopping working due to an accident at work where he was hit by a pole that caused severe back pain (patient denies ever having surgeries). States he was started on Prozac and was titrated to 40 mg qdaily and was also briefly on Abilify to augment Prozac (does not remember duration or dose of treatment). States he was taking Prozac until about 2 years ago. Reports it helped his depression. Patient denies any prior suicide attempts.

## 2024-03-13 NOTE — PAST MEDICAL HISTORY
[FreeTextEntry1] : Current medical medications are Xarelto 20 mg qdaily due to history of DVTR and Gabapentin 800 mg BID for neuropathic pain PCP is Dr. Olmstead on [facility].

## 2024-03-13 NOTE — TREATMENT
[FreeTextEntry8] : SSRI (i.e., Zoloft) for depressed mood [de-identified] : Medication management, Psychotherapy

## 2024-03-13 NOTE — REASON FOR VISIT
[Poor engagement (multiple  sessions missed)] : Poor engagement (multiple sessions missed) [FreeTextEntry9] : 12/27/2023 [FreeTextEntry8] : 3/7/2024 [FreeTextEntry2] : "I became more depressed."

## 2024-03-13 NOTE — DISCUSSION/SUMMARY
[FreeTextEntry1] : Semaj French is a 58-year-old male, domiciled alone in a private residence,  for about 15 years, has two adult daughters (25y/o, 23y/o), retired [employer] worker following an accident in 2009, on SSI and pension for financial support, PMH of scoliosis, chronic back pain, renal cancer, past psychiatric history of depression, was following with an outpatient psychiatrist starting 15 years ago but stopped about 2 years ago, medication trials of Prozac 40 mg and short-course of augmenting with Abilify, no history of suicide attempts, 1 prior inpatient psychiatric admission in December 2023, no formal past substance use history, presents to the clinic for intake appointment following discharge from [facility].  On initial psychiatric evaluation, patient demonstrates symptoms consistent with a major depressive disorder - depressed most of the day, nearly every day, loss of interest and motivation, low appetite, difficulty sleeping, fatigue, feelings of guilt, and suicidal ideation. These symptoms have lasted for a two-week period, and patient endorses that his depressed mood initially worsened around 1.5-2 years ago due to feelings of isolation after his neighbor moved. There are also a number of additional psychosocial stressors, including divorce 15 years ago, ongoing social isolation, some conflict with his older daughter, chronic back pain, not working for about the last 15 years, and stopping Prozac about 2 years ago. Patient feels as though his depression has not improved since starting Prozac and being at 40 mg while at the inpatient psychiatric unit; he also endorses ongoing suicidal ideation with no intention. At this time, patient may benefit from trying an alternative SSRI for depression. Plan will be to cross-taper to initiate Zoloft and taper off Prozac. In addition, will also continue psychotherapy at the clinic. Patient is not acutely suicidal.  No further follow up appointments were conducted with the psychiatrist after the initial psychiatric evaluation.
